# Patient Record
Sex: MALE | Race: WHITE | Employment: OTHER | URBAN - NONMETROPOLITAN AREA
[De-identification: names, ages, dates, MRNs, and addresses within clinical notes are randomized per-mention and may not be internally consistent; named-entity substitution may affect disease eponyms.]

---

## 2017-01-06 ENCOUNTER — OFFICE VISIT (OUTPATIENT)
Dept: PULMONOLOGY | Age: 67
End: 2017-01-06

## 2017-01-06 VITALS
DIASTOLIC BLOOD PRESSURE: 64 MMHG | SYSTOLIC BLOOD PRESSURE: 112 MMHG | WEIGHT: 179.8 LBS | HEART RATE: 70 BPM | BODY MASS INDEX: 26.63 KG/M2 | HEIGHT: 69 IN | OXYGEN SATURATION: 97 %

## 2017-01-06 DIAGNOSIS — I10 ESSENTIAL HYPERTENSION: ICD-10-CM

## 2017-01-06 DIAGNOSIS — I25.10 CORONARY ARTERY DISEASE INVOLVING NATIVE CORONARY ARTERY OF NATIVE HEART WITHOUT ANGINA PECTORIS: ICD-10-CM

## 2017-01-06 DIAGNOSIS — J44.9 COPD, SEVERE (HCC): ICD-10-CM

## 2017-01-06 DIAGNOSIS — Z99.89 OSA ON CPAP: Primary | ICD-10-CM

## 2017-01-06 DIAGNOSIS — G47.33 OSA ON CPAP: Primary | ICD-10-CM

## 2017-01-06 PROCEDURE — 99213 OFFICE O/P EST LOW 20 MIN: CPT | Performed by: INTERNAL MEDICINE

## 2017-01-31 ENCOUNTER — OFFICE VISIT (OUTPATIENT)
Dept: PULMONOLOGY | Age: 67
End: 2017-01-31

## 2017-01-31 VITALS
OXYGEN SATURATION: 97 % | HEIGHT: 69 IN | BODY MASS INDEX: 26.66 KG/M2 | TEMPERATURE: 98.1 F | HEART RATE: 87 BPM | WEIGHT: 180 LBS | SYSTOLIC BLOOD PRESSURE: 114 MMHG | DIASTOLIC BLOOD PRESSURE: 65 MMHG

## 2017-01-31 DIAGNOSIS — J44.9 COPD, SEVERE (HCC): Primary | ICD-10-CM

## 2017-01-31 PROCEDURE — 3023F SPIROM DOC REV: CPT | Performed by: INTERNAL MEDICINE

## 2017-01-31 PROCEDURE — G8427 DOCREV CUR MEDS BY ELIG CLIN: HCPCS | Performed by: INTERNAL MEDICINE

## 2017-01-31 PROCEDURE — 3017F COLORECTAL CA SCREEN DOC REV: CPT | Performed by: INTERNAL MEDICINE

## 2017-01-31 PROCEDURE — 1036F TOBACCO NON-USER: CPT | Performed by: INTERNAL MEDICINE

## 2017-01-31 PROCEDURE — G8926 SPIRO NO PERF OR DOC: HCPCS | Performed by: INTERNAL MEDICINE

## 2017-01-31 PROCEDURE — G8598 ASA/ANTIPLAT THER USED: HCPCS | Performed by: INTERNAL MEDICINE

## 2017-01-31 PROCEDURE — 1123F ACP DISCUSS/DSCN MKR DOCD: CPT | Performed by: INTERNAL MEDICINE

## 2017-01-31 PROCEDURE — 4040F PNEUMOC VAC/ADMIN/RCVD: CPT | Performed by: INTERNAL MEDICINE

## 2017-01-31 PROCEDURE — G8420 CALC BMI NORM PARAMETERS: HCPCS | Performed by: INTERNAL MEDICINE

## 2017-01-31 PROCEDURE — G8482 FLU IMMUNIZE ORDER/ADMIN: HCPCS | Performed by: INTERNAL MEDICINE

## 2017-01-31 PROCEDURE — 99214 OFFICE O/P EST MOD 30 MIN: CPT | Performed by: INTERNAL MEDICINE

## 2017-01-31 RX ORDER — ALBUTEROL SULFATE 90 UG/1
2 AEROSOL, METERED RESPIRATORY (INHALATION) EVERY 6 HOURS PRN
Qty: 1 INHALER | Refills: 11 | Status: SHIPPED | OUTPATIENT
Start: 2017-01-31 | End: 2017-09-26 | Stop reason: SDUPTHER

## 2017-04-10 ENCOUNTER — OFFICE VISIT (OUTPATIENT)
Dept: PULMONOLOGY | Age: 67
End: 2017-04-10

## 2017-04-10 VITALS
HEIGHT: 69 IN | WEIGHT: 169.4 LBS | HEART RATE: 72 BPM | DIASTOLIC BLOOD PRESSURE: 70 MMHG | BODY MASS INDEX: 25.09 KG/M2 | OXYGEN SATURATION: 95 % | SYSTOLIC BLOOD PRESSURE: 124 MMHG

## 2017-04-10 DIAGNOSIS — Z99.89 OBSTRUCTIVE SLEEP APNEA ON CPAP: Primary | ICD-10-CM

## 2017-04-10 DIAGNOSIS — G47.33 OBSTRUCTIVE SLEEP APNEA ON CPAP: Primary | ICD-10-CM

## 2017-04-10 PROCEDURE — 4040F PNEUMOC VAC/ADMIN/RCVD: CPT | Performed by: PHYSICIAN ASSISTANT

## 2017-04-10 PROCEDURE — 1036F TOBACCO NON-USER: CPT | Performed by: PHYSICIAN ASSISTANT

## 2017-04-10 PROCEDURE — G8427 DOCREV CUR MEDS BY ELIG CLIN: HCPCS | Performed by: PHYSICIAN ASSISTANT

## 2017-04-10 PROCEDURE — G8420 CALC BMI NORM PARAMETERS: HCPCS | Performed by: PHYSICIAN ASSISTANT

## 2017-04-10 PROCEDURE — G8598 ASA/ANTIPLAT THER USED: HCPCS | Performed by: PHYSICIAN ASSISTANT

## 2017-04-10 PROCEDURE — 3017F COLORECTAL CA SCREEN DOC REV: CPT | Performed by: PHYSICIAN ASSISTANT

## 2017-04-10 PROCEDURE — 99213 OFFICE O/P EST LOW 20 MIN: CPT | Performed by: PHYSICIAN ASSISTANT

## 2017-04-10 PROCEDURE — 1123F ACP DISCUSS/DSCN MKR DOCD: CPT | Performed by: PHYSICIAN ASSISTANT

## 2017-07-12 ENCOUNTER — OFFICE VISIT (OUTPATIENT)
Dept: PULMONOLOGY | Age: 67
End: 2017-07-12

## 2017-07-12 VITALS
HEIGHT: 69 IN | OXYGEN SATURATION: 95 % | DIASTOLIC BLOOD PRESSURE: 60 MMHG | HEART RATE: 82 BPM | WEIGHT: 181.2 LBS | SYSTOLIC BLOOD PRESSURE: 102 MMHG | BODY MASS INDEX: 26.84 KG/M2

## 2017-07-12 DIAGNOSIS — Z99.89 OBSTRUCTIVE SLEEP APNEA ON CPAP: Primary | ICD-10-CM

## 2017-07-12 DIAGNOSIS — G47.33 OBSTRUCTIVE SLEEP APNEA ON CPAP: Primary | ICD-10-CM

## 2017-07-12 PROCEDURE — 99213 OFFICE O/P EST LOW 20 MIN: CPT | Performed by: PHYSICIAN ASSISTANT

## 2017-07-12 PROCEDURE — 3017F COLORECTAL CA SCREEN DOC REV: CPT | Performed by: PHYSICIAN ASSISTANT

## 2017-07-12 PROCEDURE — 1123F ACP DISCUSS/DSCN MKR DOCD: CPT | Performed by: PHYSICIAN ASSISTANT

## 2017-07-12 PROCEDURE — G8427 DOCREV CUR MEDS BY ELIG CLIN: HCPCS | Performed by: PHYSICIAN ASSISTANT

## 2017-07-12 PROCEDURE — 1036F TOBACCO NON-USER: CPT | Performed by: PHYSICIAN ASSISTANT

## 2017-07-12 PROCEDURE — G8598 ASA/ANTIPLAT THER USED: HCPCS | Performed by: PHYSICIAN ASSISTANT

## 2017-07-12 PROCEDURE — 4040F PNEUMOC VAC/ADMIN/RCVD: CPT | Performed by: PHYSICIAN ASSISTANT

## 2017-07-12 PROCEDURE — G8419 CALC BMI OUT NRM PARAM NOF/U: HCPCS | Performed by: PHYSICIAN ASSISTANT

## 2017-07-17 ENCOUNTER — HOSPITAL ENCOUNTER (INPATIENT)
Age: 67
LOS: 6 days | Discharge: HOME | DRG: 438 | End: 2017-07-23
Attending: EMERGENCY MEDICINE | Admitting: INTERNAL MEDICINE
Payer: MEDICARE

## 2017-07-17 ENCOUNTER — HOSPITAL ENCOUNTER (OUTPATIENT)
Age: 67
Discharge: HOME | End: 2017-07-17
Payer: MEDICARE

## 2017-07-17 ENCOUNTER — APPOINTMENT (OUTPATIENT)
Dept: ULTRASOUND IMAGING | Age: 67
DRG: 438 | End: 2017-07-17
Payer: MEDICARE

## 2017-07-17 DIAGNOSIS — E11.00 DIABETIC HYPEROSMOLAR NON-KETOTIC STATE (HCC): ICD-10-CM

## 2017-07-17 DIAGNOSIS — K85.00 IDIOPATHIC ACUTE PANCREATITIS WITHOUT INFECTION OR NECROSIS: ICD-10-CM

## 2017-07-17 DIAGNOSIS — N17.9 ACUTE RENAL FAILURE, UNSPECIFIED ACUTE RENAL FAILURE TYPE (HCC): ICD-10-CM

## 2017-07-17 DIAGNOSIS — R73.9 HYPERGLYCEMIA: Primary | ICD-10-CM

## 2017-07-17 LAB
ACETONE BLOOD: POSITIVE
ALBUMIN SERPL-MCNC: 3.6 GM/DL (ref 3.5–5)
ALP BLD-CCNC: 160 U/L (ref 46–116)
ALT SERPL-CCNC: 63 U/L (ref 12–78)
AMORPHOUS: ABNORMAL
AMYLASE: 424 U/L (ref 20–104)
ANION GAP SERPL CALCULATED.3IONS-SCNC: 34 MEQ/L (ref 8–16)
ANION GAP: 28 MEQ/L (ref 8–16)
AST SERPL-CCNC: 40 U/L (ref 15–37)
AVERAGE GLUCOSE: 270 MG/DL (ref 70–126)
BACTERIA: ABNORMAL
BACTERIA: ABNORMAL
BASOPHILS # BLD: 0.3 % (ref 0–3)
BILIRUB SERPL-MCNC: 0.5 MG/DL (ref 0.2–1)
BILIRUBIN URINE: ABNORMAL
BILIRUBIN URINE: NEGATIVE
BLOOD, URINE: NEGATIVE
BLOOD, URINE: NEGATIVE
BUN BLDV-MCNC: 86 MG/DL (ref 7–22)
BUN BLDV-MCNC: 99 MG/DL (ref 7–18)
CALCIUM SERPL-MCNC: 8.1 MG/DL (ref 8.5–10.5)
CASTS UA: ABNORMAL /LPF
CASTS: ABNORMAL /LPF
CASTS: ABNORMAL /LPF
CHARACTER, URINE: CLEAR
CHARACTER, URINE: CLEAR
CHLORIDE BLD-SCNC: 88 MEQ/L (ref 98–107)
CHLORIDE BLD-SCNC: 91 MEQ/L (ref 98–111)
CO2: 11 MEQ/L (ref 23–33)
CO2: 13 MEQ/L (ref 21–32)
COLOR: YELLOW
COLOR: YELLOW
CREAT SERPL-MCNC: 3.3 MG/DL (ref 0.4–1.2)
CREAT SERPL-MCNC: 4 MG/DL (ref 0.8–1.3)
CREATININE, URINE: 52.1 MG/DL
CRYSTALS, UA: ABNORMAL
CRYSTALS: ABNORMAL
EOSINOPHILS RELATIVE PERCENT: 1 % (ref 0–4)
EPITHELIAL CELLS, UA: ABNORMAL /HPF
EPITHELIAL CELLS, UA: ABNORMAL /HPF
GFR SERPL CREATININE-BSD FRML MDRD: 19 ML/MIN/1.73M2
GFR, ESTIMATED: 16 ML/MIN/1.73M2
GLUCOSE BLD-MCNC: 1061 MG/DL (ref 74–106)
GLUCOSE BLD-MCNC: 162 MG/DL (ref 70–108)
GLUCOSE BLD-MCNC: 165 MG/DL (ref 70–108)
GLUCOSE BLD-MCNC: 191 MG/DL (ref 70–108)
GLUCOSE BLD-MCNC: 226 MG/DL (ref 70–108)
GLUCOSE BLD-MCNC: 310 MG/DL (ref 70–108)
GLUCOSE BLD-MCNC: 444 MG/DL (ref 70–108)
GLUCOSE BLD-MCNC: 553 MG/DL (ref 70–108)
GLUCOSE BLD-MCNC: 569 MG/DL (ref 70–108)
GLUCOSE BLD-MCNC: 636 MG/DL (ref 70–108)
GLUCOSE BLD-MCNC: > 600 MG/DL (ref 70–108)
GLUCOSE, URINE: >= 1000 MG/DL
GLUCOSE, URINE: >= 1000 MG/DL
HBA1C MFR BLD: 11 % (ref 4.4–6.4)
HCT VFR BLD CALC: 39.8 % (ref 42–52)
HEMOGLOBIN: 12.7 GM/DL (ref 14–18)
ICTOTEST: NEGATIVE
INR BLD: 0.79 (ref 0.85–1.13)
KETONES, URINE: 15
KETONES, URINE: 40
LEUKOCYTE EST, POC: NEGATIVE
LEUKOCYTE ESTERASE, URINE: NEGATIVE
LIPASE: > 600 U/L (ref 5.6–51.3)
LYMPHOCYTES # BLD: 8.9 % (ref 15–47)
MCH RBC QN AUTO: 28.1 PG (ref 27–31)
MCHC RBC AUTO-ENTMCNC: 31.9 GM/DL (ref 33–37)
MCV RBC AUTO: 88.2 FL (ref 80–94)
MICROALBUMIN UR-MCNC: 2.35 MG/DL
MICROALBUMIN/CREAT UR-RTO: 45 MG/G (ref 0–30)
MISCELLANEOUS LAB TEST RESULT: ABNORMAL
MONOCYTES: 5.3 % (ref 0–12)
MUCUS: ABNORMAL
NITRITE, URINE: NEGATIVE
NITRITE, URINE: NEGATIVE
PDW BLD-RTO: 12.3 % (ref 11.5–14.5)
PH UA: 5
PH UA: 5 (ref 5–9)
PLATELET # BLD: 387 THOU/MM3 (ref 130–400)
PMV BLD AUTO: 8.2 MCM (ref 7.4–10.4)
POC CALCIUM: 8.8 MG/DL (ref 8.5–10.1)
POTASSIUM SERPL-SCNC: 5 MEQ/L (ref 3.5–5.2)
POTASSIUM SERPL-SCNC: 6.1 MEQ/L (ref 3.5–5.1)
PROTEIN UA: NEGATIVE MG/DL
PROTEIN UA: NEGATIVE MG/DL
RBC # BLD: 4.52 MILL/MM3 (ref 4.7–6.1)
RBC UA: ABNORMAL /HPF
RBC URINE: ABNORMAL /HPF
REFLEX TO URINE C & S: ABNORMAL
RENAL EPITHELIAL, UA: ABNORMAL
SEGS: 84.5 % (ref 43–75)
SODIUM BLD-SCNC: 129 MEQ/L (ref 136–145)
SODIUM BLD-SCNC: 136 MEQ/L (ref 135–145)
SPECIFIC GRAVITY UA: 1.02 (ref 1–1.03)
SPECIFIC GRAVITY UA: < 1.005 (ref 1–1.03)
TOTAL PROTEIN: 7.3 GM/DL (ref 6.4–8.2)
TROPONIN I: 0.09 NG/ML
TROPONIN T: < 0.01 NG/ML
TSH SERPL DL<=0.05 MIU/L-ACNC: 1.69 UIU/ML (ref 0.4–4.2)
UROBILINOGEN, URINE: 0.2 EU/DL
UROBILINOGEN, URINE: 0.2 EU/DL (ref 0–1)
WBC # BLD: 13.1 THOU/MM3 (ref 4.8–10.8)
WBC UA: ABNORMAL /HPF
WBC UA: ABNORMAL /HPF
YEAST: ABNORMAL

## 2017-07-17 PROCEDURE — 82043 UR ALBUMIN QUANTITATIVE: CPT

## 2017-07-17 PROCEDURE — 6360000002 HC RX W HCPCS: Performed by: INTERNAL MEDICINE

## 2017-07-17 PROCEDURE — 94640 AIRWAY INHALATION TREATMENT: CPT

## 2017-07-17 PROCEDURE — 81001 URINALYSIS AUTO W/SCOPE: CPT

## 2017-07-17 PROCEDURE — 80053 COMPREHEN METABOLIC PANEL: CPT

## 2017-07-17 PROCEDURE — 84484 ASSAY OF TROPONIN QUANT: CPT

## 2017-07-17 PROCEDURE — 76705 ECHO EXAM OF ABDOMEN: CPT

## 2017-07-17 PROCEDURE — 6370000000 HC RX 637 (ALT 250 FOR IP): Performed by: EMERGENCY MEDICINE

## 2017-07-17 PROCEDURE — 99285 EMERGENCY DEPT VISIT HI MDM: CPT

## 2017-07-17 PROCEDURE — 83690 ASSAY OF LIPASE: CPT

## 2017-07-17 PROCEDURE — 99223 1ST HOSP IP/OBS HIGH 75: CPT | Performed by: INTERNAL MEDICINE

## 2017-07-17 PROCEDURE — 93005 ELECTROCARDIOGRAM TRACING: CPT

## 2017-07-17 PROCEDURE — 85025 COMPLETE CBC W/AUTO DIFF WBC: CPT

## 2017-07-17 PROCEDURE — 82010 KETONE BODYS QUAN: CPT

## 2017-07-17 PROCEDURE — 82150 ASSAY OF AMYLASE: CPT

## 2017-07-17 PROCEDURE — 2580000003 HC RX 258: Performed by: INTERNAL MEDICINE

## 2017-07-17 PROCEDURE — 84443 ASSAY THYROID STIM HORMONE: CPT

## 2017-07-17 PROCEDURE — 85610 PROTHROMBIN TIME: CPT

## 2017-07-17 PROCEDURE — 36415 COLL VENOUS BLD VENIPUNCTURE: CPT

## 2017-07-17 PROCEDURE — 80048 BASIC METABOLIC PNL TOTAL CA: CPT

## 2017-07-17 PROCEDURE — 2060000000 HC ICU INTERMEDIATE R&B

## 2017-07-17 PROCEDURE — 2580000003 HC RX 258: Performed by: EMERGENCY MEDICINE

## 2017-07-17 PROCEDURE — C9113 INJ PANTOPRAZOLE SODIUM, VIA: HCPCS | Performed by: INTERNAL MEDICINE

## 2017-07-17 PROCEDURE — 82948 REAGENT STRIP/BLOOD GLUCOSE: CPT

## 2017-07-17 PROCEDURE — 6370000000 HC RX 637 (ALT 250 FOR IP): Performed by: INTERNAL MEDICINE

## 2017-07-17 PROCEDURE — 84145 PROCALCITONIN (PCT): CPT

## 2017-07-17 PROCEDURE — 83036 HEMOGLOBIN GLYCOSYLATED A1C: CPT

## 2017-07-17 RX ORDER — SUCRALFATE ORAL 1 G/10ML
1 SUSPENSION ORAL EVERY 6 HOURS SCHEDULED
Status: DISCONTINUED | OUTPATIENT
Start: 2017-07-17 | End: 2017-07-23 | Stop reason: HOSPADM

## 2017-07-17 RX ORDER — CARVEDILOL 12.5 MG/1
12.5 TABLET ORAL 2 TIMES DAILY WITH MEALS
Status: ON HOLD | COMMUNITY
End: 2017-09-16

## 2017-07-17 RX ORDER — ACETAMINOPHEN 325 MG/1
650 TABLET ORAL EVERY 4 HOURS PRN
Status: DISCONTINUED | OUTPATIENT
Start: 2017-07-17 | End: 2017-07-23 | Stop reason: HOSPADM

## 2017-07-17 RX ORDER — SPIRONOLACTONE 25 MG/1
25 TABLET ORAL DAILY
COMMUNITY
End: 2018-04-14

## 2017-07-17 RX ORDER — HYDRALAZINE HYDROCHLORIDE 10 MG/1
10 TABLET, FILM COATED ORAL 3 TIMES DAILY
Status: DISCONTINUED | OUTPATIENT
Start: 2017-07-17 | End: 2017-07-23 | Stop reason: HOSPADM

## 2017-07-17 RX ORDER — NICOTINE POLACRILEX 4 MG
15 LOZENGE BUCCAL PRN
Status: DISCONTINUED | OUTPATIENT
Start: 2017-07-17 | End: 2017-07-23 | Stop reason: HOSPADM

## 2017-07-17 RX ORDER — HEPARIN SODIUM 5000 [USP'U]/ML
5000 INJECTION, SOLUTION INTRAVENOUS; SUBCUTANEOUS 2 TIMES DAILY
Status: DISCONTINUED | OUTPATIENT
Start: 2017-07-17 | End: 2017-07-23 | Stop reason: HOSPADM

## 2017-07-17 RX ORDER — SODIUM CHLORIDE 0.9 % (FLUSH) 0.9 %
10 SYRINGE (ML) INJECTION PRN
Status: DISCONTINUED | OUTPATIENT
Start: 2017-07-17 | End: 2017-07-17

## 2017-07-17 RX ORDER — 0.9 % SODIUM CHLORIDE 0.9 %
500 INTRAVENOUS SOLUTION INTRAVENOUS ONCE
Status: COMPLETED | OUTPATIENT
Start: 2017-07-17 | End: 2017-07-18

## 2017-07-17 RX ORDER — LEVOTHYROXINE SODIUM 0.03 MG/1
25 TABLET ORAL DAILY
Status: DISCONTINUED | OUTPATIENT
Start: 2017-07-17 | End: 2017-07-23 | Stop reason: HOSPADM

## 2017-07-17 RX ORDER — DEXTROSE MONOHYDRATE 50 MG/ML
100 INJECTION, SOLUTION INTRAVENOUS PRN
Status: DISCONTINUED | OUTPATIENT
Start: 2017-07-17 | End: 2017-07-23 | Stop reason: HOSPADM

## 2017-07-17 RX ORDER — SODIUM CHLORIDE 0.9 % (FLUSH) 0.9 %
10 SYRINGE (ML) INJECTION EVERY 12 HOURS SCHEDULED
Status: DISCONTINUED | OUTPATIENT
Start: 2017-07-17 | End: 2017-07-17

## 2017-07-17 RX ORDER — LEVOTHYROXINE SODIUM 0.03 MG/1
25 TABLET ORAL DAILY
COMMUNITY

## 2017-07-17 RX ORDER — SODIUM CHLORIDE 9 MG/ML
INJECTION, SOLUTION INTRAVENOUS CONTINUOUS
Status: DISCONTINUED | OUTPATIENT
Start: 2017-07-17 | End: 2017-07-17 | Stop reason: SDUPTHER

## 2017-07-17 RX ORDER — PRAVASTATIN SODIUM 80 MG/1
80 TABLET ORAL DAILY
Status: DISCONTINUED | OUTPATIENT
Start: 2017-07-17 | End: 2017-07-23 | Stop reason: HOSPADM

## 2017-07-17 RX ORDER — DEXTROSE MONOHYDRATE 25 G/50ML
12.5 INJECTION, SOLUTION INTRAVENOUS PRN
Status: DISCONTINUED | OUTPATIENT
Start: 2017-07-17 | End: 2017-07-23 | Stop reason: HOSPADM

## 2017-07-17 RX ORDER — ONDANSETRON 2 MG/ML
4 INJECTION INTRAMUSCULAR; INTRAVENOUS EVERY 6 HOURS PRN
Status: DISCONTINUED | OUTPATIENT
Start: 2017-07-17 | End: 2017-07-23 | Stop reason: HOSPADM

## 2017-07-17 RX ORDER — CARVEDILOL 6.25 MG/1
12.5 TABLET ORAL 2 TIMES DAILY WITH MEALS
Status: DISCONTINUED | OUTPATIENT
Start: 2017-07-17 | End: 2017-07-17 | Stop reason: SDUPTHER

## 2017-07-17 RX ORDER — 0.9 % SODIUM CHLORIDE 0.9 %
500 INTRAVENOUS SOLUTION INTRAVENOUS ONCE
Status: COMPLETED | OUTPATIENT
Start: 2017-07-18 | End: 2017-07-18

## 2017-07-17 RX ORDER — CARVEDILOL 6.25 MG/1
12.5 TABLET ORAL 2 TIMES DAILY
Status: DISCONTINUED | OUTPATIENT
Start: 2017-07-17 | End: 2017-07-23 | Stop reason: HOSPADM

## 2017-07-17 RX ORDER — LOSARTAN POTASSIUM 25 MG/1
25 TABLET ORAL DAILY
Status: DISCONTINUED | OUTPATIENT
Start: 2017-07-18 | End: 2017-07-22

## 2017-07-17 RX ORDER — SODIUM CHLORIDE 9 MG/ML
INJECTION, SOLUTION INTRAVENOUS CONTINUOUS
Status: DISCONTINUED | OUTPATIENT
Start: 2017-07-17 | End: 2017-07-18

## 2017-07-17 RX ORDER — ALBUTEROL SULFATE 90 UG/1
2 AEROSOL, METERED RESPIRATORY (INHALATION) EVERY 6 HOURS PRN
Status: DISCONTINUED | OUTPATIENT
Start: 2017-07-17 | End: 2017-07-23 | Stop reason: HOSPADM

## 2017-07-17 RX ORDER — ALBUTEROL SULFATE 2.5 MG/3ML
2.5 SOLUTION RESPIRATORY (INHALATION) 4 TIMES DAILY
Status: DISCONTINUED | OUTPATIENT
Start: 2017-07-17 | End: 2017-07-23 | Stop reason: HOSPADM

## 2017-07-17 RX ORDER — ONDANSETRON 2 MG/ML
4 INJECTION INTRAMUSCULAR; INTRAVENOUS EVERY 6 HOURS PRN
Status: DISCONTINUED | OUTPATIENT
Start: 2017-07-17 | End: 2017-07-17 | Stop reason: SDUPTHER

## 2017-07-17 RX ORDER — LOSARTAN POTASSIUM 50 MG/1
50 TABLET ORAL DAILY
Status: ON HOLD | COMMUNITY
End: 2017-07-23

## 2017-07-17 RX ORDER — BUMETANIDE 1 MG/1
2 TABLET ORAL DAILY
Status: DISCONTINUED | OUTPATIENT
Start: 2017-07-18 | End: 2017-07-18

## 2017-07-17 RX ORDER — 0.9 % SODIUM CHLORIDE 0.9 %
1000 INTRAVENOUS SOLUTION INTRAVENOUS ONCE
Status: COMPLETED | OUTPATIENT
Start: 2017-07-17 | End: 2017-07-17

## 2017-07-17 RX ORDER — ASPIRIN 81 MG/1
81 TABLET ORAL DAILY
Status: DISCONTINUED | OUTPATIENT
Start: 2017-07-17 | End: 2017-07-23 | Stop reason: HOSPADM

## 2017-07-17 RX ORDER — SPIRONOLACTONE 25 MG/1
25 TABLET ORAL DAILY
Status: DISCONTINUED | OUTPATIENT
Start: 2017-07-18 | End: 2017-07-23 | Stop reason: HOSPADM

## 2017-07-17 RX ORDER — SODIUM CHLORIDE 9 MG/ML
INJECTION, SOLUTION INTRAVENOUS CONTINUOUS
Status: DISCONTINUED | OUTPATIENT
Start: 2017-07-17 | End: 2017-07-17

## 2017-07-17 RX ORDER — LEVOTHYROXINE SODIUM 0.03 MG/1
25 TABLET ORAL DAILY
Status: DISCONTINUED | OUTPATIENT
Start: 2017-07-17 | End: 2017-07-17 | Stop reason: SDUPTHER

## 2017-07-17 RX ORDER — MAGNESIUM HYDROXIDE/ALUMINUM HYDROXICE/SIMETHICONE 120; 1200; 1200 MG/30ML; MG/30ML; MG/30ML
30 SUSPENSION ORAL EVERY 6 HOURS PRN
Status: DISCONTINUED | OUTPATIENT
Start: 2017-07-17 | End: 2017-07-23 | Stop reason: HOSPADM

## 2017-07-17 RX ORDER — BUMETANIDE 1 MG/1
1 TABLET ORAL 2 TIMES DAILY
COMMUNITY
End: 2018-10-23 | Stop reason: ALTCHOICE

## 2017-07-17 RX ORDER — ACETAMINOPHEN 325 MG/1
650 TABLET ORAL EVERY 6 HOURS PRN
Status: DISCONTINUED | OUTPATIENT
Start: 2017-07-17 | End: 2017-07-17 | Stop reason: SDUPTHER

## 2017-07-17 RX ADMIN — SODIUM CHLORIDE 1000 ML: 9 INJECTION, SOLUTION INTRAVENOUS at 12:35

## 2017-07-17 RX ADMIN — SODIUM CHLORIDE: 9 INJECTION, SOLUTION INTRAVENOUS at 16:47

## 2017-07-17 RX ADMIN — SODIUM CHLORIDE: 9 INJECTION, SOLUTION INTRAVENOUS at 21:58

## 2017-07-17 RX ADMIN — SODIUM CHLORIDE 1000 ML: 9 INJECTION, SOLUTION INTRAVENOUS at 14:11

## 2017-07-17 RX ADMIN — ALBUTEROL SULFATE 2.5 MG: 2.5 SOLUTION RESPIRATORY (INHALATION) at 22:44

## 2017-07-17 RX ADMIN — ONDANSETRON 4 MG: 2 INJECTION INTRAMUSCULAR; INTRAVENOUS at 16:47

## 2017-07-17 RX ADMIN — INSULIN HUMAN 4 UNITS: 100 INJECTION, SOLUTION PARENTERAL at 12:27

## 2017-07-17 RX ADMIN — SODIUM CHLORIDE 4 UNITS: 9 INJECTION, SOLUTION INTRAVENOUS at 12:30

## 2017-07-17 RX ADMIN — HYDRALAZINE HYDROCHLORIDE 10 MG: 10 TABLET, FILM COATED ORAL at 22:06

## 2017-07-17 RX ADMIN — HEPARIN SODIUM 5000 UNITS: 5000 INJECTION, SOLUTION INTRAVENOUS; SUBCUTANEOUS at 22:07

## 2017-07-17 RX ADMIN — CARVEDILOL 12.5 MG: 6.25 TABLET, FILM COATED ORAL at 22:06

## 2017-07-17 RX ADMIN — SODIUM CHLORIDE 8 MG/HR: 9 INJECTION, SOLUTION INTRAVENOUS at 19:06

## 2017-07-17 RX ADMIN — ONDANSETRON 4 MG: 2 INJECTION INTRAMUSCULAR; INTRAVENOUS at 16:21

## 2017-07-17 ASSESSMENT — ENCOUNTER SYMPTOMS
EYE PAIN: 0
VOMITING: 1
SHORTNESS OF BREATH: 0
WHEEZING: 0
BLOOD IN STOOL: 0
SORE THROAT: 0
DIARRHEA: 1
EYE DISCHARGE: 0
ABDOMINAL PAIN: 0
NAUSEA: 1

## 2017-07-17 ASSESSMENT — PAIN DESCRIPTION - LOCATION
LOCATION: ABDOMEN
LOCATION: ABDOMEN
LOCATION: OTHER (COMMENT)

## 2017-07-17 ASSESSMENT — PAIN DESCRIPTION - PAIN TYPE
TYPE: ACUTE PAIN

## 2017-07-17 ASSESSMENT — PAIN SCALES - GENERAL
PAINLEVEL_OUTOF10: 7
PAINLEVEL_OUTOF10: 10
PAINLEVEL_OUTOF10: 10

## 2017-07-17 ASSESSMENT — PAIN DESCRIPTION - DESCRIPTORS: DESCRIPTORS: BURNING;CONSTANT

## 2017-07-17 ASSESSMENT — PAIN DESCRIPTION - ONSET: ONSET: SUDDEN

## 2017-07-18 ENCOUNTER — APPOINTMENT (OUTPATIENT)
Dept: GENERAL RADIOLOGY | Age: 67
DRG: 438 | End: 2017-07-18
Payer: MEDICARE

## 2017-07-18 ENCOUNTER — APPOINTMENT (OUTPATIENT)
Dept: NUCLEAR MEDICINE | Age: 67
DRG: 438 | End: 2017-07-18
Payer: MEDICARE

## 2017-07-18 PROBLEM — K85.90 PANCREATITIS: Status: ACTIVE | Noted: 2017-07-18

## 2017-07-18 LAB
ANION GAP SERPL CALCULATED.3IONS-SCNC: 17 MEQ/L (ref 8–16)
BASOPHILS # BLD: 0.4 %
BASOPHILS ABSOLUTE: 0 THOU/MM3 (ref 0–0.1)
BUN BLDV-MCNC: 79 MG/DL (ref 7–22)
CALCIUM SERPL-MCNC: 7.7 MG/DL (ref 8.5–10.5)
CHLORIDE BLD-SCNC: 102 MEQ/L (ref 98–111)
CO2: 22 MEQ/L (ref 23–33)
CREAT SERPL-MCNC: 2.9 MG/DL (ref 0.4–1.2)
EOSINOPHIL # BLD: 0.4 %
EOSINOPHILS ABSOLUTE: 0 THOU/MM3 (ref 0–0.4)
GFR SERPL CREATININE-BSD FRML MDRD: 22 ML/MIN/1.73M2
GLUCOSE BLD-MCNC: 112 MG/DL (ref 70–108)
GLUCOSE BLD-MCNC: 125 MG/DL (ref 70–108)
GLUCOSE BLD-MCNC: 140 MG/DL (ref 70–108)
GLUCOSE BLD-MCNC: 141 MG/DL (ref 70–108)
GLUCOSE BLD-MCNC: 150 MG/DL (ref 70–108)
GLUCOSE BLD-MCNC: 150 MG/DL (ref 70–108)
GLUCOSE BLD-MCNC: 151 MG/DL (ref 70–108)
GLUCOSE BLD-MCNC: 162 MG/DL (ref 70–108)
GLUCOSE BLD-MCNC: 164 MG/DL (ref 70–108)
GLUCOSE BLD-MCNC: 180 MG/DL (ref 70–108)
GLUCOSE BLD-MCNC: 182 MG/DL (ref 70–108)
GLUCOSE BLD-MCNC: 187 MG/DL (ref 70–108)
GLUCOSE BLD-MCNC: 196 MG/DL (ref 70–108)
GLUCOSE BLD-MCNC: 199 MG/DL (ref 70–108)
GLUCOSE BLD-MCNC: 220 MG/DL (ref 70–108)
GLUCOSE BLD-MCNC: 224 MG/DL (ref 70–108)
GLUCOSE BLD-MCNC: 245 MG/DL (ref 70–108)
GLUCOSE BLD-MCNC: 76 MG/DL (ref 70–108)
GLUCOSE BLD-MCNC: 98 MG/DL (ref 70–108)
HCT VFR BLD CALC: 33.1 % (ref 42–52)
HEMOGLOBIN: 11.1 GM/DL (ref 14–18)
LYMPHOCYTES # BLD: 8.9 %
LYMPHOCYTES ABSOLUTE: 1 THOU/MM3 (ref 1–4.8)
MCH RBC QN AUTO: 28.7 PG (ref 27–31)
MCHC RBC AUTO-ENTMCNC: 33.4 GM/DL (ref 33–37)
MCV RBC AUTO: 85.8 FL (ref 80–94)
MONOCYTES # BLD: 8.2 %
MONOCYTES ABSOLUTE: 0.9 THOU/MM3 (ref 0.4–1.3)
NUCLEATED RED BLOOD CELLS: 0 /100 WBC
PDW BLD-RTO: 13.9 % (ref 11.5–14.5)
PLATELET # BLD: 279 THOU/MM3 (ref 130–400)
PMV BLD AUTO: 7.9 MCM (ref 7.4–10.4)
POTASSIUM SERPL-SCNC: 4.9 MEQ/L (ref 3.5–5.2)
PROCALCITONIN: 0.41 NG/ML (ref 0.01–0.09)
RBC # BLD: 3.86 MILL/MM3 (ref 4.7–6.1)
RBC # BLD: NORMAL 10*6/UL
SEG NEUTROPHILS: 82.1 %
SEGMENTED NEUTROPHILS ABSOLUTE COUNT: 9.2 THOU/MM3 (ref 1.8–7.7)
SODIUM BLD-SCNC: 141 MEQ/L (ref 135–145)
TROPONIN T: < 0.01 NG/ML
WBC # BLD: 11.2 THOU/MM3 (ref 4.8–10.8)

## 2017-07-18 PROCEDURE — 74000 XR ABDOMEN LIMITED (KUB): CPT

## 2017-07-18 PROCEDURE — 94640 AIRWAY INHALATION TREATMENT: CPT

## 2017-07-18 PROCEDURE — 97116 GAIT TRAINING THERAPY: CPT

## 2017-07-18 PROCEDURE — 97165 OT EVAL LOW COMPLEX 30 MIN: CPT

## 2017-07-18 PROCEDURE — 6370000000 HC RX 637 (ALT 250 FOR IP): Performed by: INTERNAL MEDICINE

## 2017-07-18 PROCEDURE — 6360000002 HC RX W HCPCS: Performed by: INTERNAL MEDICINE

## 2017-07-18 PROCEDURE — G8979 MOBILITY GOAL STATUS: HCPCS

## 2017-07-18 PROCEDURE — 99233 SBSQ HOSP IP/OBS HIGH 50: CPT | Performed by: INTERNAL MEDICINE

## 2017-07-18 PROCEDURE — A9537 TC99M MEBROFENIN: HCPCS | Performed by: NURSE PRACTITIONER

## 2017-07-18 PROCEDURE — 97110 THERAPEUTIC EXERCISES: CPT

## 2017-07-18 PROCEDURE — C9113 INJ PANTOPRAZOLE SODIUM, VIA: HCPCS | Performed by: INTERNAL MEDICINE

## 2017-07-18 PROCEDURE — G8978 MOBILITY CURRENT STATUS: HCPCS

## 2017-07-18 PROCEDURE — 2580000003 HC RX 258: Performed by: INTERNAL MEDICINE

## 2017-07-18 PROCEDURE — 3430000000 HC RX DIAGNOSTIC RADIOPHARMACEUTICAL: Performed by: NURSE PRACTITIONER

## 2017-07-18 PROCEDURE — 80048 BASIC METABOLIC PNL TOTAL CA: CPT

## 2017-07-18 PROCEDURE — 97162 PT EVAL MOD COMPLEX 30 MIN: CPT

## 2017-07-18 PROCEDURE — 36415 COLL VENOUS BLD VENIPUNCTURE: CPT

## 2017-07-18 PROCEDURE — 82948 REAGENT STRIP/BLOOD GLUCOSE: CPT

## 2017-07-18 PROCEDURE — 6360000004 HC RX CONTRAST MEDICATION: Performed by: RADIOLOGY

## 2017-07-18 PROCEDURE — 84484 ASSAY OF TROPONIN QUANT: CPT

## 2017-07-18 PROCEDURE — 78227 HEPATOBIL SYST IMAGE W/DRUG: CPT

## 2017-07-18 PROCEDURE — 71010 XR CHEST PORTABLE: CPT

## 2017-07-18 PROCEDURE — 2060000000 HC ICU INTERMEDIATE R&B

## 2017-07-18 PROCEDURE — 2580000003 HC RX 258: Performed by: RADIOLOGY

## 2017-07-18 PROCEDURE — 6360000002 HC RX W HCPCS: Performed by: ANESTHESIOLOGY

## 2017-07-18 PROCEDURE — 85025 COMPLETE CBC W/AUTO DIFF WBC: CPT

## 2017-07-18 PROCEDURE — 99222 1ST HOSP IP/OBS MODERATE 55: CPT | Performed by: SURGERY

## 2017-07-18 RX ORDER — BUMETANIDE 1 MG/1
2 TABLET ORAL DAILY
Status: DISCONTINUED | OUTPATIENT
Start: 2017-07-20 | End: 2017-07-23 | Stop reason: HOSPADM

## 2017-07-18 RX ORDER — SODIUM CHLORIDE 9 MG/ML
INJECTION, SOLUTION INTRAVENOUS CONTINUOUS
Status: DISCONTINUED | OUTPATIENT
Start: 2017-07-18 | End: 2017-07-21

## 2017-07-18 RX ADMIN — HYDROMORPHONE HYDROCHLORIDE 1 MG: 1 INJECTION, SOLUTION INTRAMUSCULAR; INTRAVENOUS; SUBCUTANEOUS at 16:43

## 2017-07-18 RX ADMIN — Medication 4.8 UNITS/HR: at 13:43

## 2017-07-18 RX ADMIN — SODIUM CHLORIDE 500 ML: 9 INJECTION, SOLUTION INTRAVENOUS at 00:06

## 2017-07-18 RX ADMIN — PRAVASTATIN SODIUM 80 MG: 80 TABLET ORAL at 21:19

## 2017-07-18 RX ADMIN — HEPARIN SODIUM 5000 UNITS: 5000 INJECTION, SOLUTION INTRAVENOUS; SUBCUTANEOUS at 21:19

## 2017-07-18 RX ADMIN — ALBUTEROL SULFATE 2.5 MG: 2.5 SOLUTION RESPIRATORY (INHALATION) at 10:56

## 2017-07-18 RX ADMIN — HYDROMORPHONE HYDROCHLORIDE 1 MG: 1 INJECTION, SOLUTION INTRAMUSCULAR; INTRAVENOUS; SUBCUTANEOUS at 00:06

## 2017-07-18 RX ADMIN — TIOTROPIUM BROMIDE 18 MCG: 18 CAPSULE ORAL; RESPIRATORY (INHALATION) at 10:56

## 2017-07-18 RX ADMIN — LOSARTAN POTASSIUM 25 MG: 25 TABLET, FILM COATED ORAL at 09:18

## 2017-07-18 RX ADMIN — LEVOTHYROXINE SODIUM 25 MCG: 25 TABLET ORAL at 09:18

## 2017-07-18 RX ADMIN — HYDRALAZINE HYDROCHLORIDE 10 MG: 10 TABLET, FILM COATED ORAL at 21:19

## 2017-07-18 RX ADMIN — SODIUM CHLORIDE 8 MG/HR: 9 INJECTION, SOLUTION INTRAVENOUS at 05:12

## 2017-07-18 RX ADMIN — HYDROMORPHONE HYDROCHLORIDE 1 MG: 1 INJECTION, SOLUTION INTRAMUSCULAR; INTRAVENOUS; SUBCUTANEOUS at 07:20

## 2017-07-18 RX ADMIN — Medication 8.1 MILLICURIE: at 14:25

## 2017-07-18 RX ADMIN — HYDROMORPHONE HYDROCHLORIDE 1 MG: 1 INJECTION, SOLUTION INTRAMUSCULAR; INTRAVENOUS; SUBCUTANEOUS at 21:21

## 2017-07-18 RX ADMIN — CARVEDILOL 12.5 MG: 6.25 TABLET, FILM COATED ORAL at 09:18

## 2017-07-18 RX ADMIN — HYDRALAZINE HYDROCHLORIDE 10 MG: 10 TABLET, FILM COATED ORAL at 09:18

## 2017-07-18 RX ADMIN — ASPIRIN 81 MG: 81 TABLET ORAL at 09:18

## 2017-07-18 RX ADMIN — HEPARIN SODIUM 5000 UNITS: 5000 INJECTION, SOLUTION INTRAVENOUS; SUBCUTANEOUS at 09:18

## 2017-07-18 RX ADMIN — SODIUM CHLORIDE 1.65 MCG: 9 INJECTION, SOLUTION INTRAVENOUS at 15:25

## 2017-07-18 RX ADMIN — ALBUTEROL SULFATE 2.5 MG: 2.5 SOLUTION RESPIRATORY (INHALATION) at 16:48

## 2017-07-18 RX ADMIN — SODIUM CHLORIDE 500 ML: 9 INJECTION, SOLUTION INTRAVENOUS at 02:30

## 2017-07-18 RX ADMIN — SPIRONOLACTONE 25 MG: 25 TABLET, FILM COATED ORAL at 09:18

## 2017-07-18 RX ADMIN — CARVEDILOL 12.5 MG: 6.25 TABLET, FILM COATED ORAL at 21:19

## 2017-07-18 RX ADMIN — SODIUM CHLORIDE: 9 INJECTION, SOLUTION INTRAVENOUS at 09:17

## 2017-07-18 RX ADMIN — SODIUM CHLORIDE 8 MG/HR: 9 INJECTION, SOLUTION INTRAVENOUS at 18:04

## 2017-07-18 RX ADMIN — SODIUM CHLORIDE: 9 INJECTION, SOLUTION INTRAVENOUS at 18:03

## 2017-07-18 RX ADMIN — ALBUTEROL SULFATE 2.5 MG: 2.5 SOLUTION RESPIRATORY (INHALATION) at 21:06

## 2017-07-18 RX ADMIN — HYDRALAZINE HYDROCHLORIDE 10 MG: 10 TABLET, FILM COATED ORAL at 16:50

## 2017-07-18 RX ADMIN — BUMETANIDE 2 MG: 1 TABLET ORAL at 09:18

## 2017-07-18 ASSESSMENT — PAIN DESCRIPTION - PAIN TYPE
TYPE: ACUTE PAIN
TYPE: ACUTE PAIN

## 2017-07-18 ASSESSMENT — PAIN SCALES - GENERAL
PAINLEVEL_OUTOF10: 10
PAINLEVEL_OUTOF10: 6
PAINLEVEL_OUTOF10: 0
PAINLEVEL_OUTOF10: 4
PAINLEVEL_OUTOF10: 6
PAINLEVEL_OUTOF10: 3
PAINLEVEL_OUTOF10: 8
PAINLEVEL_OUTOF10: 4
PAINLEVEL_OUTOF10: 5

## 2017-07-18 ASSESSMENT — PAIN DESCRIPTION - LOCATION
LOCATION: ABDOMEN

## 2017-07-19 ENCOUNTER — APPOINTMENT (OUTPATIENT)
Dept: MRI IMAGING | Age: 67
DRG: 438 | End: 2017-07-19
Payer: MEDICARE

## 2017-07-19 LAB
ANION GAP SERPL CALCULATED.3IONS-SCNC: 9 MEQ/L (ref 8–16)
AVERAGE GLUCOSE: 282 MG/DL (ref 70–126)
BUN BLDV-MCNC: 55 MG/DL (ref 7–22)
CALCIUM SERPL-MCNC: 7.8 MG/DL (ref 8.5–10.5)
CHLORIDE BLD-SCNC: 106 MEQ/L (ref 98–111)
CO2: 25 MEQ/L (ref 23–33)
CREAT SERPL-MCNC: 2 MG/DL (ref 0.4–1.2)
EKG ATRIAL RATE: 98 BPM
EKG P AXIS: 67 DEGREES
EKG P-R INTERVAL: 208 MS
EKG Q-T INTERVAL: 380 MS
EKG QRS DURATION: 98 MS
EKG QTC CALCULATION (BAZETT): 485 MS
EKG R AXIS: 117 DEGREES
EKG T AXIS: 36 DEGREES
EKG VENTRICULAR RATE: 98 BPM
GFR SERPL CREATININE-BSD FRML MDRD: 33 ML/MIN/1.73M2
GLUCOSE BLD-MCNC: 102 MG/DL (ref 70–108)
GLUCOSE BLD-MCNC: 103 MG/DL (ref 70–108)
GLUCOSE BLD-MCNC: 103 MG/DL (ref 70–108)
GLUCOSE BLD-MCNC: 104 MG/DL (ref 70–108)
GLUCOSE BLD-MCNC: 107 MG/DL (ref 70–108)
GLUCOSE BLD-MCNC: 107 MG/DL (ref 70–108)
GLUCOSE BLD-MCNC: 108 MG/DL (ref 70–108)
GLUCOSE BLD-MCNC: 108 MG/DL (ref 70–108)
GLUCOSE BLD-MCNC: 110 MG/DL (ref 70–108)
GLUCOSE BLD-MCNC: 114 MG/DL (ref 70–108)
GLUCOSE BLD-MCNC: 118 MG/DL (ref 70–108)
GLUCOSE BLD-MCNC: 120 MG/DL (ref 70–108)
GLUCOSE BLD-MCNC: 120 MG/DL (ref 70–108)
GLUCOSE BLD-MCNC: 134 MG/DL (ref 70–108)
GLUCOSE BLD-MCNC: 187 MG/DL (ref 70–108)
GLUCOSE BLD-MCNC: 96 MG/DL (ref 70–108)
GLUCOSE BLD-MCNC: 98 MG/DL (ref 70–108)
HBA1C MFR BLD: 11.4 % (ref 4.4–6.4)
HCT VFR BLD CALC: 32.6 % (ref 42–52)
HEMOGLOBIN: 10.9 GM/DL (ref 14–18)
LIPASE: > 600 U/L (ref 5.6–51.3)
MAGNESIUM: 2.2 MG/DL (ref 1.6–2.4)
MCH RBC QN AUTO: 28.5 PG (ref 27–31)
MCHC RBC AUTO-ENTMCNC: 33.4 GM/DL (ref 33–37)
MCV RBC AUTO: 85.3 FL (ref 80–94)
PDW BLD-RTO: 14.3 % (ref 11.5–14.5)
PLATELET # BLD: 237 THOU/MM3 (ref 130–400)
PMV BLD AUTO: 7.8 MCM (ref 7.4–10.4)
POTASSIUM SERPL-SCNC: 4.4 MEQ/L (ref 3.5–5.2)
RBC # BLD: 3.82 MILL/MM3 (ref 4.7–6.1)
SODIUM BLD-SCNC: 140 MEQ/L (ref 135–145)
TRIGL SERPL-MCNC: 298 MG/DL (ref 0–199)
WBC # BLD: 9.6 THOU/MM3 (ref 4.8–10.8)

## 2017-07-19 PROCEDURE — 83036 HEMOGLOBIN GLYCOSYLATED A1C: CPT

## 2017-07-19 PROCEDURE — 2580000003 HC RX 258: Performed by: INTERNAL MEDICINE

## 2017-07-19 PROCEDURE — 83690 ASSAY OF LIPASE: CPT

## 2017-07-19 PROCEDURE — A9585 GADOBUTROL INJECTION: HCPCS | Performed by: SURGERY

## 2017-07-19 PROCEDURE — 6370000000 HC RX 637 (ALT 250 FOR IP): Performed by: INTERNAL MEDICINE

## 2017-07-19 PROCEDURE — 80048 BASIC METABOLIC PNL TOTAL CA: CPT

## 2017-07-19 PROCEDURE — 83735 ASSAY OF MAGNESIUM: CPT

## 2017-07-19 PROCEDURE — 99232 SBSQ HOSP IP/OBS MODERATE 35: CPT | Performed by: SURGERY

## 2017-07-19 PROCEDURE — C9113 INJ PANTOPRAZOLE SODIUM, VIA: HCPCS | Performed by: INTERNAL MEDICINE

## 2017-07-19 PROCEDURE — 6360000002 HC RX W HCPCS: Performed by: INTERNAL MEDICINE

## 2017-07-19 PROCEDURE — 97110 THERAPEUTIC EXERCISES: CPT

## 2017-07-19 PROCEDURE — 82948 REAGENT STRIP/BLOOD GLUCOSE: CPT

## 2017-07-19 PROCEDURE — 97116 GAIT TRAINING THERAPY: CPT

## 2017-07-19 PROCEDURE — 36415 COLL VENOUS BLD VENIPUNCTURE: CPT

## 2017-07-19 PROCEDURE — 84478 ASSAY OF TRIGLYCERIDES: CPT

## 2017-07-19 PROCEDURE — 2060000000 HC ICU INTERMEDIATE R&B

## 2017-07-19 PROCEDURE — 6360000002 HC RX W HCPCS: Performed by: SURGERY

## 2017-07-19 PROCEDURE — 94640 AIRWAY INHALATION TREATMENT: CPT

## 2017-07-19 PROCEDURE — 6360000002 HC RX W HCPCS: Performed by: ANESTHESIOLOGY

## 2017-07-19 PROCEDURE — 97530 THERAPEUTIC ACTIVITIES: CPT

## 2017-07-19 PROCEDURE — 85027 COMPLETE CBC AUTOMATED: CPT

## 2017-07-19 PROCEDURE — 99232 SBSQ HOSP IP/OBS MODERATE 35: CPT | Performed by: INTERNAL MEDICINE

## 2017-07-19 PROCEDURE — 74183 MRI ABD W/O CNTR FLWD CNTR: CPT

## 2017-07-19 RX ADMIN — HYDRALAZINE HYDROCHLORIDE 10 MG: 10 TABLET, FILM COATED ORAL at 17:14

## 2017-07-19 RX ADMIN — HYDRALAZINE HYDROCHLORIDE 10 MG: 10 TABLET, FILM COATED ORAL at 09:29

## 2017-07-19 RX ADMIN — SODIUM CHLORIDE: 9 INJECTION, SOLUTION INTRAVENOUS at 05:14

## 2017-07-19 RX ADMIN — GADOBUTROL 10 ML: 604.72 INJECTION INTRAVENOUS at 09:05

## 2017-07-19 RX ADMIN — CARVEDILOL 12.5 MG: 6.25 TABLET, FILM COATED ORAL at 20:50

## 2017-07-19 RX ADMIN — ASPIRIN 81 MG: 81 TABLET ORAL at 09:29

## 2017-07-19 RX ADMIN — ONDANSETRON 4 MG: 2 INJECTION INTRAMUSCULAR; INTRAVENOUS at 10:40

## 2017-07-19 RX ADMIN — HYDROMORPHONE HYDROCHLORIDE 1 MG: 1 INJECTION, SOLUTION INTRAMUSCULAR; INTRAVENOUS; SUBCUTANEOUS at 05:15

## 2017-07-19 RX ADMIN — TIOTROPIUM BROMIDE 18 MCG: 18 CAPSULE ORAL; RESPIRATORY (INHALATION) at 09:56

## 2017-07-19 RX ADMIN — SUCRALFATE 1 G: 1 SUSPENSION ORAL at 19:35

## 2017-07-19 RX ADMIN — SODIUM CHLORIDE 8 MG/HR: 9 INJECTION, SOLUTION INTRAVENOUS at 20:51

## 2017-07-19 RX ADMIN — SODIUM CHLORIDE: 9 INJECTION, SOLUTION INTRAVENOUS at 14:21

## 2017-07-19 RX ADMIN — HYDROMORPHONE HYDROCHLORIDE 1 MG: 1 INJECTION, SOLUTION INTRAMUSCULAR; INTRAVENOUS; SUBCUTANEOUS at 17:00

## 2017-07-19 RX ADMIN — CARVEDILOL 12.5 MG: 6.25 TABLET, FILM COATED ORAL at 09:28

## 2017-07-19 RX ADMIN — HEPARIN SODIUM 5000 UNITS: 5000 INJECTION, SOLUTION INTRAVENOUS; SUBCUTANEOUS at 20:56

## 2017-07-19 RX ADMIN — ALBUTEROL SULFATE 2.5 MG: 2.5 SOLUTION RESPIRATORY (INHALATION) at 18:09

## 2017-07-19 RX ADMIN — SODIUM CHLORIDE 8 MG/HR: 9 INJECTION, SOLUTION INTRAVENOUS at 05:14

## 2017-07-19 RX ADMIN — LEVOTHYROXINE SODIUM 25 MCG: 25 TABLET ORAL at 09:28

## 2017-07-19 RX ADMIN — HYDROMORPHONE HYDROCHLORIDE 1 MG: 1 INJECTION, SOLUTION INTRAMUSCULAR; INTRAVENOUS; SUBCUTANEOUS at 10:11

## 2017-07-19 RX ADMIN — ALBUTEROL SULFATE 2.5 MG: 2.5 SOLUTION RESPIRATORY (INHALATION) at 09:56

## 2017-07-19 RX ADMIN — PRAVASTATIN SODIUM 80 MG: 80 TABLET ORAL at 20:51

## 2017-07-19 RX ADMIN — SPIRONOLACTONE 25 MG: 25 TABLET, FILM COATED ORAL at 09:29

## 2017-07-19 RX ADMIN — HYDRALAZINE HYDROCHLORIDE 10 MG: 10 TABLET, FILM COATED ORAL at 20:51

## 2017-07-19 RX ADMIN — ALBUTEROL SULFATE 2.5 MG: 2.5 SOLUTION RESPIRATORY (INHALATION) at 21:37

## 2017-07-19 RX ADMIN — SUCRALFATE 1 G: 1 SUSPENSION ORAL at 12:26

## 2017-07-19 RX ADMIN — HEPARIN SODIUM 5000 UNITS: 5000 INJECTION, SOLUTION INTRAVENOUS; SUBCUTANEOUS at 09:30

## 2017-07-19 RX ADMIN — LOSARTAN POTASSIUM 25 MG: 25 TABLET, FILM COATED ORAL at 09:29

## 2017-07-19 ASSESSMENT — PAIN DESCRIPTION - LOCATION
LOCATION: ABDOMEN

## 2017-07-19 ASSESSMENT — PAIN DESCRIPTION - PAIN TYPE
TYPE: ACUTE PAIN

## 2017-07-19 ASSESSMENT — PAIN SCALES - GENERAL
PAINLEVEL_OUTOF10: 10
PAINLEVEL_OUTOF10: 4
PAINLEVEL_OUTOF10: 4
PAINLEVEL_OUTOF10: 5
PAINLEVEL_OUTOF10: 3
PAINLEVEL_OUTOF10: 6
PAINLEVEL_OUTOF10: 3
PAINLEVEL_OUTOF10: 10
PAINLEVEL_OUTOF10: 3
PAINLEVEL_OUTOF10: 9
PAINLEVEL_OUTOF10: 0
PAINLEVEL_OUTOF10: 6

## 2017-07-19 ASSESSMENT — PAIN DESCRIPTION - DESCRIPTORS
DESCRIPTORS: BURNING;CONSTANT

## 2017-07-20 ENCOUNTER — ANESTHESIA EVENT (OUTPATIENT)
Dept: ENDOSCOPY | Age: 67
DRG: 438 | End: 2017-07-20
Payer: MEDICARE

## 2017-07-20 ENCOUNTER — ANESTHESIA (OUTPATIENT)
Dept: ENDOSCOPY | Age: 67
DRG: 438 | End: 2017-07-20
Payer: MEDICARE

## 2017-07-20 VITALS — SYSTOLIC BLOOD PRESSURE: 131 MMHG | DIASTOLIC BLOOD PRESSURE: 57 MMHG | OXYGEN SATURATION: 99 %

## 2017-07-20 LAB
ALBUMIN SERPL-MCNC: 2.6 G/DL (ref 3.5–5.1)
ALBUMIN SERPL-MCNC: 2.8 G/DL (ref 3.5–5.1)
ALP BLD-CCNC: 125 U/L (ref 38–126)
ALP BLD-CCNC: 125 U/L (ref 38–126)
ALT SERPL-CCNC: 30 U/L (ref 11–66)
ALT SERPL-CCNC: 31 U/L (ref 11–66)
AMYLASE: 191 U/L (ref 20–104)
ANION GAP SERPL CALCULATED.3IONS-SCNC: 11 MEQ/L (ref 8–16)
AST SERPL-CCNC: 29 U/L (ref 5–40)
AST SERPL-CCNC: 32 U/L (ref 5–40)
BILIRUB SERPL-MCNC: 0.2 MG/DL (ref 0.3–1.2)
BILIRUB SERPL-MCNC: 0.2 MG/DL (ref 0.3–1.2)
BILIRUBIN DIRECT: < 0.2 MG/DL (ref 0–0.3)
BUN BLDV-MCNC: 30 MG/DL (ref 7–22)
CA 19-9: 84 U/ML (ref 0–35)
CALCIUM IONIZED: 1.07 MMOL/L (ref 1.12–1.32)
CALCIUM SERPL-MCNC: 7.8 MG/DL (ref 8.5–10.5)
CHLORIDE BLD-SCNC: 111 MEQ/L (ref 98–111)
CO2: 23 MEQ/L (ref 23–33)
CREAT SERPL-MCNC: 1.3 MG/DL (ref 0.4–1.2)
GFR SERPL CREATININE-BSD FRML MDRD: 55 ML/MIN/1.73M2
GLUCOSE BLD-MCNC: 101 MG/DL (ref 70–108)
GLUCOSE BLD-MCNC: 107 MG/DL (ref 70–108)
GLUCOSE BLD-MCNC: 108 MG/DL (ref 70–108)
GLUCOSE BLD-MCNC: 110 MG/DL (ref 70–108)
GLUCOSE BLD-MCNC: 110 MG/DL (ref 70–108)
GLUCOSE BLD-MCNC: 111 MG/DL (ref 70–108)
GLUCOSE BLD-MCNC: 119 MG/DL (ref 70–108)
GLUCOSE BLD-MCNC: 120 MG/DL (ref 70–108)
GLUCOSE BLD-MCNC: 122 MG/DL (ref 70–108)
GLUCOSE BLD-MCNC: 126 MG/DL (ref 70–108)
GLUCOSE BLD-MCNC: 126 MG/DL (ref 70–108)
GLUCOSE BLD-MCNC: 135 MG/DL (ref 70–108)
GLUCOSE BLD-MCNC: 140 MG/DL (ref 70–108)
GLUCOSE BLD-MCNC: 186 MG/DL (ref 70–108)
GLUCOSE BLD-MCNC: 87 MG/DL (ref 70–108)
GLUCOSE BLD-MCNC: 89 MG/DL (ref 70–108)
GLUCOSE BLD-MCNC: 93 MG/DL (ref 70–108)
GLUCOSE BLD-MCNC: 94 MG/DL (ref 70–108)
HCT VFR BLD CALC: 33.7 % (ref 42–52)
HEMOGLOBIN: 11.2 GM/DL (ref 14–18)
LIPASE: 281.9 U/L (ref 5.6–51.3)
MCH RBC QN AUTO: 29 PG (ref 27–31)
MCHC RBC AUTO-ENTMCNC: 33.3 GM/DL (ref 33–37)
MCV RBC AUTO: 87 FL (ref 80–94)
PDW BLD-RTO: 14.6 % (ref 11.5–14.5)
PLATELET # BLD: 201 THOU/MM3 (ref 130–400)
PMV BLD AUTO: 8.2 MCM (ref 7.4–10.4)
POTASSIUM SERPL-SCNC: 4.6 MEQ/L (ref 3.5–5.2)
RBC # BLD: 3.87 MILL/MM3 (ref 4.7–6.1)
SODIUM BLD-SCNC: 145 MEQ/L (ref 135–145)
TOTAL PROTEIN: 5.5 G/DL (ref 6.1–8)
TOTAL PROTEIN: 5.8 G/DL (ref 6.1–8)
WBC # BLD: 7 THOU/MM3 (ref 4.8–10.8)

## 2017-07-20 PROCEDURE — 2500000003 HC RX 250 WO HCPCS: Performed by: SPECIALIST

## 2017-07-20 PROCEDURE — 6360000002 HC RX W HCPCS: Performed by: INTERNAL MEDICINE

## 2017-07-20 PROCEDURE — 2580000003 HC RX 258: Performed by: INTERNAL MEDICINE

## 2017-07-20 PROCEDURE — 86301 IMMUNOASSAY TUMOR CA 19-9: CPT

## 2017-07-20 PROCEDURE — C9113 INJ PANTOPRAZOLE SODIUM, VIA: HCPCS | Performed by: INTERNAL MEDICINE

## 2017-07-20 PROCEDURE — 3700000000 HC ANESTHESIA ATTENDED CARE: Performed by: INTERNAL MEDICINE

## 2017-07-20 PROCEDURE — 80053 COMPREHEN METABOLIC PANEL: CPT

## 2017-07-20 PROCEDURE — 88305 TISSUE EXAM BY PATHOLOGIST: CPT

## 2017-07-20 PROCEDURE — 99232 SBSQ HOSP IP/OBS MODERATE 35: CPT | Performed by: SURGERY

## 2017-07-20 PROCEDURE — 6360000002 HC RX W HCPCS: Performed by: SPECIALIST

## 2017-07-20 PROCEDURE — 3700000001 HC ADD 15 MINUTES (ANESTHESIA): Performed by: INTERNAL MEDICINE

## 2017-07-20 PROCEDURE — 2060000000 HC ICU INTERMEDIATE R&B

## 2017-07-20 PROCEDURE — 3609012400 HC EGD TRANSORAL BIOPSY SINGLE/MULTIPLE: Performed by: INTERNAL MEDICINE

## 2017-07-20 PROCEDURE — 2500000003 HC RX 250 WO HCPCS

## 2017-07-20 PROCEDURE — 82330 ASSAY OF CALCIUM: CPT

## 2017-07-20 PROCEDURE — 97116 GAIT TRAINING THERAPY: CPT

## 2017-07-20 PROCEDURE — 6370000000 HC RX 637 (ALT 250 FOR IP): Performed by: INTERNAL MEDICINE

## 2017-07-20 PROCEDURE — 85027 COMPLETE CBC AUTOMATED: CPT

## 2017-07-20 PROCEDURE — 99233 SBSQ HOSP IP/OBS HIGH 50: CPT | Performed by: INTERNAL MEDICINE

## 2017-07-20 PROCEDURE — 83690 ASSAY OF LIPASE: CPT

## 2017-07-20 PROCEDURE — 80076 HEPATIC FUNCTION PANEL: CPT

## 2017-07-20 PROCEDURE — 7100000000 HC PACU RECOVERY - FIRST 15 MIN: Performed by: INTERNAL MEDICINE

## 2017-07-20 PROCEDURE — 6360000002 HC RX W HCPCS: Performed by: ANESTHESIOLOGY

## 2017-07-20 PROCEDURE — 2580000003 HC RX 258: Performed by: SPECIALIST

## 2017-07-20 PROCEDURE — 82948 REAGENT STRIP/BLOOD GLUCOSE: CPT

## 2017-07-20 PROCEDURE — 0DB78ZX EXCISION OF STOMACH, PYLORUS, VIA NATURAL OR ARTIFICIAL OPENING ENDOSCOPIC, DIAGNOSTIC: ICD-10-PCS | Performed by: INTERNAL MEDICINE

## 2017-07-20 PROCEDURE — 82150 ASSAY OF AMYLASE: CPT

## 2017-07-20 PROCEDURE — 36415 COLL VENOUS BLD VENIPUNCTURE: CPT

## 2017-07-20 PROCEDURE — 97110 THERAPEUTIC EXERCISES: CPT

## 2017-07-20 PROCEDURE — 94640 AIRWAY INHALATION TREATMENT: CPT

## 2017-07-20 RX ORDER — SODIUM CHLORIDE 450 MG/100ML
INJECTION, SOLUTION INTRAVENOUS CONTINUOUS PRN
Status: DISCONTINUED | OUTPATIENT
Start: 2017-07-20 | End: 2017-07-20

## 2017-07-20 RX ORDER — LIDOCAINE HYDROCHLORIDE 10 MG/ML
INJECTION, SOLUTION INFILTRATION; PERINEURAL PRN
Status: DISCONTINUED | OUTPATIENT
Start: 2017-07-20 | End: 2017-07-20 | Stop reason: SDUPTHER

## 2017-07-20 RX ORDER — SODIUM CHLORIDE 450 MG/100ML
INJECTION, SOLUTION INTRAVENOUS CONTINUOUS PRN
Status: DISCONTINUED | OUTPATIENT
Start: 2017-07-20 | End: 2017-07-20 | Stop reason: SDUPTHER

## 2017-07-20 RX ORDER — PROPOFOL 10 MG/ML
INJECTION, EMULSION INTRAVENOUS PRN
Status: DISCONTINUED | OUTPATIENT
Start: 2017-07-20 | End: 2017-07-20 | Stop reason: SDUPTHER

## 2017-07-20 RX ADMIN — SUCRALFATE 1 G: 1 SUSPENSION ORAL at 23:41

## 2017-07-20 RX ADMIN — HEPARIN SODIUM 5000 UNITS: 5000 INJECTION, SOLUTION INTRAVENOUS; SUBCUTANEOUS at 20:13

## 2017-07-20 RX ADMIN — SUCRALFATE 1 G: 1 SUSPENSION ORAL at 05:44

## 2017-07-20 RX ADMIN — BUMETANIDE 2 MG: 1 TABLET ORAL at 08:39

## 2017-07-20 RX ADMIN — ALBUTEROL SULFATE 2.5 MG: 2.5 SOLUTION RESPIRATORY (INHALATION) at 17:47

## 2017-07-20 RX ADMIN — PRAVASTATIN SODIUM 80 MG: 80 TABLET ORAL at 20:12

## 2017-07-20 RX ADMIN — SUCRALFATE 1 G: 1 SUSPENSION ORAL at 17:37

## 2017-07-20 RX ADMIN — CARVEDILOL 12.5 MG: 6.25 TABLET, FILM COATED ORAL at 08:39

## 2017-07-20 RX ADMIN — SUCRALFATE 1 G: 1 SUSPENSION ORAL at 01:31

## 2017-07-20 RX ADMIN — LEVOTHYROXINE SODIUM 25 MCG: 25 TABLET ORAL at 08:39

## 2017-07-20 RX ADMIN — SODIUM CHLORIDE 8 MG/HR: 9 INJECTION, SOLUTION INTRAVENOUS at 06:34

## 2017-07-20 RX ADMIN — HYDRALAZINE HYDROCHLORIDE 10 MG: 10 TABLET, FILM COATED ORAL at 08:39

## 2017-07-20 RX ADMIN — LIDOCAINE HYDROCHLORIDE 100 MG: 10 INJECTION, SOLUTION INFILTRATION; PERINEURAL at 14:41

## 2017-07-20 RX ADMIN — SPIRONOLACTONE 25 MG: 25 TABLET, FILM COATED ORAL at 08:38

## 2017-07-20 RX ADMIN — ALBUTEROL SULFATE 2.5 MG: 2.5 SOLUTION RESPIRATORY (INHALATION) at 21:35

## 2017-07-20 RX ADMIN — HYDROMORPHONE HYDROCHLORIDE 1 MG: 1 INJECTION, SOLUTION INTRAMUSCULAR; INTRAVENOUS; SUBCUTANEOUS at 01:31

## 2017-07-20 RX ADMIN — SODIUM CHLORIDE: 9 INJECTION, SOLUTION INTRAVENOUS at 20:12

## 2017-07-20 RX ADMIN — CARVEDILOL 12.5 MG: 6.25 TABLET, FILM COATED ORAL at 20:12

## 2017-07-20 RX ADMIN — SODIUM CHLORIDE: 4.5 INJECTION, SOLUTION INTRAVENOUS at 14:37

## 2017-07-20 RX ADMIN — HYDROMORPHONE HYDROCHLORIDE 1 MG: 1 INJECTION, SOLUTION INTRAMUSCULAR; INTRAVENOUS; SUBCUTANEOUS at 10:11

## 2017-07-20 RX ADMIN — LOSARTAN POTASSIUM 25 MG: 25 TABLET, FILM COATED ORAL at 08:39

## 2017-07-20 RX ADMIN — Medication 1.4 UNITS/HR: at 06:34

## 2017-07-20 RX ADMIN — HYDROMORPHONE HYDROCHLORIDE 1 MG: 1 INJECTION, SOLUTION INTRAMUSCULAR; INTRAVENOUS; SUBCUTANEOUS at 17:36

## 2017-07-20 RX ADMIN — TIOTROPIUM BROMIDE 18 MCG: 18 CAPSULE ORAL; RESPIRATORY (INHALATION) at 08:18

## 2017-07-20 RX ADMIN — PROPOFOL 50 MG: 10 INJECTION, EMULSION INTRAVENOUS at 14:41

## 2017-07-20 RX ADMIN — SODIUM CHLORIDE 8 MG/HR: 9 INJECTION, SOLUTION INTRAVENOUS at 19:21

## 2017-07-20 RX ADMIN — ALBUTEROL SULFATE 2.5 MG: 2.5 SOLUTION RESPIRATORY (INHALATION) at 12:32

## 2017-07-20 RX ADMIN — ALBUTEROL SULFATE 2.5 MG: 2.5 SOLUTION RESPIRATORY (INHALATION) at 08:18

## 2017-07-20 RX ADMIN — HYDRALAZINE HYDROCHLORIDE 10 MG: 10 TABLET, FILM COATED ORAL at 20:12

## 2017-07-20 ASSESSMENT — PAIN DESCRIPTION - PAIN TYPE: TYPE: ACUTE PAIN

## 2017-07-20 ASSESSMENT — PAIN DESCRIPTION - DESCRIPTORS: DESCRIPTORS: BURNING

## 2017-07-20 ASSESSMENT — PAIN DESCRIPTION - ONSET: ONSET: ON-GOING

## 2017-07-20 ASSESSMENT — PAIN DESCRIPTION - LOCATION: LOCATION: ABDOMEN

## 2017-07-20 ASSESSMENT — PAIN SCALES - GENERAL
PAINLEVEL_OUTOF10: 7
PAINLEVEL_OUTOF10: 0
PAINLEVEL_OUTOF10: 0
PAINLEVEL_OUTOF10: 7
PAINLEVEL_OUTOF10: 0
PAINLEVEL_OUTOF10: 0
PAINLEVEL_OUTOF10: 8
PAINLEVEL_OUTOF10: 0
PAINLEVEL_OUTOF10: 3

## 2017-07-20 ASSESSMENT — ENCOUNTER SYMPTOMS: SHORTNESS OF BREATH: 1

## 2017-07-20 ASSESSMENT — COPD QUESTIONNAIRES: CAT_SEVERITY: NO INTERVAL CHANGE

## 2017-07-21 LAB
GLUCOSE BLD-MCNC: 101 MG/DL (ref 70–108)
GLUCOSE BLD-MCNC: 103 MG/DL (ref 70–108)
GLUCOSE BLD-MCNC: 106 MG/DL (ref 70–108)
GLUCOSE BLD-MCNC: 110 MG/DL (ref 70–108)
GLUCOSE BLD-MCNC: 125 MG/DL (ref 70–108)
GLUCOSE BLD-MCNC: 139 MG/DL (ref 70–108)
GLUCOSE BLD-MCNC: 185 MG/DL (ref 70–108)
GLUCOSE BLD-MCNC: 227 MG/DL (ref 70–108)
GLUCOSE BLD-MCNC: 94 MG/DL (ref 70–108)
LIPASE: 183.6 U/L (ref 5.6–51.3)

## 2017-07-21 PROCEDURE — 97116 GAIT TRAINING THERAPY: CPT

## 2017-07-21 PROCEDURE — 97110 THERAPEUTIC EXERCISES: CPT

## 2017-07-21 PROCEDURE — 2580000003 HC RX 258: Performed by: INTERNAL MEDICINE

## 2017-07-21 PROCEDURE — 6360000002 HC RX W HCPCS: Performed by: INTERNAL MEDICINE

## 2017-07-21 PROCEDURE — 6370000000 HC RX 637 (ALT 250 FOR IP): Performed by: INTERNAL MEDICINE

## 2017-07-21 PROCEDURE — 94640 AIRWAY INHALATION TREATMENT: CPT

## 2017-07-21 PROCEDURE — C9113 INJ PANTOPRAZOLE SODIUM, VIA: HCPCS | Performed by: INTERNAL MEDICINE

## 2017-07-21 PROCEDURE — 97530 THERAPEUTIC ACTIVITIES: CPT

## 2017-07-21 PROCEDURE — 83690 ASSAY OF LIPASE: CPT

## 2017-07-21 PROCEDURE — 99233 SBSQ HOSP IP/OBS HIGH 50: CPT | Performed by: INTERNAL MEDICINE

## 2017-07-21 PROCEDURE — 36415 COLL VENOUS BLD VENIPUNCTURE: CPT

## 2017-07-21 PROCEDURE — 99232 SBSQ HOSP IP/OBS MODERATE 35: CPT | Performed by: SURGERY

## 2017-07-21 PROCEDURE — 6360000002 HC RX W HCPCS: Performed by: ANESTHESIOLOGY

## 2017-07-21 PROCEDURE — 2060000000 HC ICU INTERMEDIATE R&B

## 2017-07-21 PROCEDURE — 82948 REAGENT STRIP/BLOOD GLUCOSE: CPT

## 2017-07-21 RX ORDER — INSULIN GLARGINE 100 [IU]/ML
30 INJECTION, SOLUTION SUBCUTANEOUS DAILY
Status: DISCONTINUED | OUTPATIENT
Start: 2017-07-21 | End: 2017-07-22

## 2017-07-21 RX ADMIN — HYDRALAZINE HYDROCHLORIDE 10 MG: 10 TABLET, FILM COATED ORAL at 08:59

## 2017-07-21 RX ADMIN — ALBUTEROL SULFATE 2.5 MG: 2.5 SOLUTION RESPIRATORY (INHALATION) at 10:31

## 2017-07-21 RX ADMIN — Medication 4 UNITS: at 16:42

## 2017-07-21 RX ADMIN — SPIRONOLACTONE 25 MG: 25 TABLET, FILM COATED ORAL at 08:59

## 2017-07-21 RX ADMIN — SUCRALFATE 1 G: 1 SUSPENSION ORAL at 06:08

## 2017-07-21 RX ADMIN — Medication 2 UNITS: at 12:35

## 2017-07-21 RX ADMIN — HYDROMORPHONE HYDROCHLORIDE 1 MG: 1 INJECTION, SOLUTION INTRAMUSCULAR; INTRAVENOUS; SUBCUTANEOUS at 21:35

## 2017-07-21 RX ADMIN — SODIUM CHLORIDE 8 MG/HR: 9 INJECTION, SOLUTION INTRAVENOUS at 06:08

## 2017-07-21 RX ADMIN — LOSARTAN POTASSIUM 25 MG: 25 TABLET, FILM COATED ORAL at 08:59

## 2017-07-21 RX ADMIN — HYDRALAZINE HYDROCHLORIDE 10 MG: 10 TABLET, FILM COATED ORAL at 20:37

## 2017-07-21 RX ADMIN — HEPARIN SODIUM 5000 UNITS: 5000 INJECTION, SOLUTION INTRAVENOUS; SUBCUTANEOUS at 08:59

## 2017-07-21 RX ADMIN — TIOTROPIUM BROMIDE 18 MCG: 18 CAPSULE ORAL; RESPIRATORY (INHALATION) at 10:31

## 2017-07-21 RX ADMIN — ALBUTEROL SULFATE 2.5 MG: 2.5 SOLUTION RESPIRATORY (INHALATION) at 13:32

## 2017-07-21 RX ADMIN — CARVEDILOL 12.5 MG: 6.25 TABLET, FILM COATED ORAL at 08:59

## 2017-07-21 RX ADMIN — SODIUM CHLORIDE 8 MG/HR: 9 INJECTION, SOLUTION INTRAVENOUS at 17:42

## 2017-07-21 RX ADMIN — INSULIN GLARGINE 30 UNITS: 100 INJECTION, SOLUTION SUBCUTANEOUS at 08:59

## 2017-07-21 RX ADMIN — CARVEDILOL 12.5 MG: 6.25 TABLET, FILM COATED ORAL at 20:37

## 2017-07-21 RX ADMIN — ASPIRIN 81 MG: 81 TABLET ORAL at 11:52

## 2017-07-21 RX ADMIN — ALBUTEROL SULFATE 2.5 MG: 2.5 SOLUTION RESPIRATORY (INHALATION) at 17:46

## 2017-07-21 RX ADMIN — HEPARIN SODIUM 5000 UNITS: 5000 INJECTION, SOLUTION INTRAVENOUS; SUBCUTANEOUS at 20:38

## 2017-07-21 RX ADMIN — BUMETANIDE 2 MG: 1 TABLET ORAL at 08:59

## 2017-07-21 RX ADMIN — SUCRALFATE 1 G: 1 SUSPENSION ORAL at 16:42

## 2017-07-21 RX ADMIN — HYDROMORPHONE HYDROCHLORIDE 1 MG: 1 INJECTION, SOLUTION INTRAMUSCULAR; INTRAVENOUS; SUBCUTANEOUS at 11:53

## 2017-07-21 RX ADMIN — SUCRALFATE 1 G: 1 SUSPENSION ORAL at 12:35

## 2017-07-21 RX ADMIN — HYDRALAZINE HYDROCHLORIDE 10 MG: 10 TABLET, FILM COATED ORAL at 14:09

## 2017-07-21 RX ADMIN — HYDROMORPHONE HYDROCHLORIDE 1 MG: 1 INJECTION, SOLUTION INTRAMUSCULAR; INTRAVENOUS; SUBCUTANEOUS at 01:58

## 2017-07-21 RX ADMIN — PRAVASTATIN SODIUM 80 MG: 80 TABLET ORAL at 20:37

## 2017-07-21 RX ADMIN — LEVOTHYROXINE SODIUM 25 MCG: 25 TABLET ORAL at 08:59

## 2017-07-21 ASSESSMENT — PAIN SCALES - GENERAL
PAINLEVEL_OUTOF10: 0
PAINLEVEL_OUTOF10: 6
PAINLEVEL_OUTOF10: 3
PAINLEVEL_OUTOF10: 7
PAINLEVEL_OUTOF10: 0
PAINLEVEL_OUTOF10: 8
PAINLEVEL_OUTOF10: 0
PAINLEVEL_OUTOF10: 8
PAINLEVEL_OUTOF10: 0

## 2017-07-21 ASSESSMENT — PAIN DESCRIPTION - DESCRIPTORS
DESCRIPTORS: ACHING
DESCRIPTORS: ACHING

## 2017-07-21 ASSESSMENT — PAIN DESCRIPTION - PAIN TYPE
TYPE: ACUTE PAIN
TYPE: ACUTE PAIN

## 2017-07-21 ASSESSMENT — PAIN DESCRIPTION - PROGRESSION
CLINICAL_PROGRESSION: GRADUALLY WORSENING
CLINICAL_PROGRESSION: GRADUALLY WORSENING

## 2017-07-21 ASSESSMENT — PAIN DESCRIPTION - LOCATION
LOCATION: ABDOMEN

## 2017-07-21 ASSESSMENT — PAIN DESCRIPTION - ONSET: ONSET: ON-GOING

## 2017-07-22 LAB
ANION GAP SERPL CALCULATED.3IONS-SCNC: 10 MEQ/L (ref 8–16)
BUN BLDV-MCNC: 10 MG/DL (ref 7–22)
CALCIUM SERPL-MCNC: 7.9 MG/DL (ref 8.5–10.5)
CHLORIDE BLD-SCNC: 103 MEQ/L (ref 98–111)
CO2: 29 MEQ/L (ref 23–33)
CREAT SERPL-MCNC: 1 MG/DL (ref 0.4–1.2)
GFR SERPL CREATININE-BSD FRML MDRD: 74 ML/MIN/1.73M2
GLUCOSE BLD-MCNC: 111 MG/DL (ref 70–108)
GLUCOSE BLD-MCNC: 136 MG/DL (ref 70–108)
GLUCOSE BLD-MCNC: 204 MG/DL (ref 70–108)
GLUCOSE BLD-MCNC: 265 MG/DL (ref 70–108)
GLUCOSE BLD-MCNC: 285 MG/DL (ref 70–108)
GLUCOSE BLD-MCNC: 48 MG/DL (ref 70–108)
GLUCOSE BLD-MCNC: 67 MG/DL (ref 70–108)
LIPASE: 136 U/L (ref 5.6–51.3)
POTASSIUM SERPL-SCNC: 3.6 MEQ/L (ref 3.5–5.2)
SODIUM BLD-SCNC: 142 MEQ/L (ref 135–145)

## 2017-07-22 PROCEDURE — 6360000002 HC RX W HCPCS: Performed by: INTERNAL MEDICINE

## 2017-07-22 PROCEDURE — 6370000000 HC RX 637 (ALT 250 FOR IP): Performed by: INTERNAL MEDICINE

## 2017-07-22 PROCEDURE — 36415 COLL VENOUS BLD VENIPUNCTURE: CPT

## 2017-07-22 PROCEDURE — 2060000000 HC ICU INTERMEDIATE R&B

## 2017-07-22 PROCEDURE — C9113 INJ PANTOPRAZOLE SODIUM, VIA: HCPCS | Performed by: INTERNAL MEDICINE

## 2017-07-22 PROCEDURE — 99232 SBSQ HOSP IP/OBS MODERATE 35: CPT | Performed by: SURGERY

## 2017-07-22 PROCEDURE — 2580000003 HC RX 258: Performed by: INTERNAL MEDICINE

## 2017-07-22 PROCEDURE — 80048 BASIC METABOLIC PNL TOTAL CA: CPT

## 2017-07-22 PROCEDURE — 83690 ASSAY OF LIPASE: CPT

## 2017-07-22 PROCEDURE — 82948 REAGENT STRIP/BLOOD GLUCOSE: CPT

## 2017-07-22 PROCEDURE — 99232 SBSQ HOSP IP/OBS MODERATE 35: CPT | Performed by: INTERNAL MEDICINE

## 2017-07-22 PROCEDURE — 94640 AIRWAY INHALATION TREATMENT: CPT

## 2017-07-22 RX ORDER — INSULIN GLARGINE 100 [IU]/ML
28 INJECTION, SOLUTION SUBCUTANEOUS DAILY
Status: DISCONTINUED | OUTPATIENT
Start: 2017-07-23 | End: 2017-07-23

## 2017-07-22 RX ORDER — LOSARTAN POTASSIUM 25 MG/1
25 TABLET ORAL ONCE
Status: COMPLETED | OUTPATIENT
Start: 2017-07-22 | End: 2017-07-22

## 2017-07-22 RX ORDER — LOSARTAN POTASSIUM 50 MG/1
50 TABLET ORAL DAILY
Status: DISCONTINUED | OUTPATIENT
Start: 2017-07-23 | End: 2017-07-23

## 2017-07-22 RX ORDER — PANTOPRAZOLE SODIUM 40 MG/1
40 TABLET, DELAYED RELEASE ORAL
Status: DISCONTINUED | OUTPATIENT
Start: 2017-07-22 | End: 2017-07-23 | Stop reason: HOSPADM

## 2017-07-22 RX ADMIN — Medication 4 UNITS: at 13:03

## 2017-07-22 RX ADMIN — HYDRALAZINE HYDROCHLORIDE 10 MG: 10 TABLET, FILM COATED ORAL at 08:11

## 2017-07-22 RX ADMIN — INSULIN GLARGINE 30 UNITS: 100 INJECTION, SOLUTION SUBCUTANEOUS at 08:13

## 2017-07-22 RX ADMIN — ALBUTEROL SULFATE 2.5 MG: 2.5 SOLUTION RESPIRATORY (INHALATION) at 17:28

## 2017-07-22 RX ADMIN — LOSARTAN POTASSIUM 25 MG: 25 TABLET, FILM COATED ORAL at 08:11

## 2017-07-22 RX ADMIN — SUCRALFATE 1 G: 1 SUSPENSION ORAL at 13:01

## 2017-07-22 RX ADMIN — HYDRALAZINE HYDROCHLORIDE 10 MG: 10 TABLET, FILM COATED ORAL at 21:27

## 2017-07-22 RX ADMIN — TIOTROPIUM BROMIDE 18 MCG: 18 CAPSULE ORAL; RESPIRATORY (INHALATION) at 10:04

## 2017-07-22 RX ADMIN — ALBUTEROL SULFATE 2.5 MG: 2.5 SOLUTION RESPIRATORY (INHALATION) at 21:08

## 2017-07-22 RX ADMIN — SPIRONOLACTONE 25 MG: 25 TABLET, FILM COATED ORAL at 08:11

## 2017-07-22 RX ADMIN — PANTOPRAZOLE SODIUM 40 MG: 40 TABLET, DELAYED RELEASE ORAL at 16:18

## 2017-07-22 RX ADMIN — ASPIRIN 81 MG: 81 TABLET ORAL at 08:11

## 2017-07-22 RX ADMIN — LEVOTHYROXINE SODIUM 25 MCG: 25 TABLET ORAL at 08:11

## 2017-07-22 RX ADMIN — INSULIN LISPRO 3 UNITS: 100 INJECTION, SOLUTION INTRAVENOUS; SUBCUTANEOUS at 18:12

## 2017-07-22 RX ADMIN — SUCRALFATE 1 G: 1 SUSPENSION ORAL at 00:41

## 2017-07-22 RX ADMIN — ALBUTEROL SULFATE 2.5 MG: 2.5 SOLUTION RESPIRATORY (INHALATION) at 09:54

## 2017-07-22 RX ADMIN — LOSARTAN POTASSIUM 25 MG: 25 TABLET, FILM COATED ORAL at 13:02

## 2017-07-22 RX ADMIN — HEPARIN SODIUM 5000 UNITS: 5000 INJECTION, SOLUTION INTRAVENOUS; SUBCUTANEOUS at 08:13

## 2017-07-22 RX ADMIN — HEPARIN SODIUM 5000 UNITS: 5000 INJECTION, SOLUTION INTRAVENOUS; SUBCUTANEOUS at 21:28

## 2017-07-22 RX ADMIN — PRAVASTATIN SODIUM 80 MG: 80 TABLET ORAL at 21:28

## 2017-07-22 RX ADMIN — SODIUM CHLORIDE 8 MG/HR: 9 INJECTION, SOLUTION INTRAVENOUS at 06:34

## 2017-07-22 RX ADMIN — SUCRALFATE 1 G: 1 SUSPENSION ORAL at 06:34

## 2017-07-22 RX ADMIN — BUMETANIDE 2 MG: 1 TABLET ORAL at 08:11

## 2017-07-22 RX ADMIN — CARVEDILOL 12.5 MG: 6.25 TABLET, FILM COATED ORAL at 08:11

## 2017-07-22 RX ADMIN — SUCRALFATE 1 G: 1 SUSPENSION ORAL at 23:57

## 2017-07-22 RX ADMIN — Medication 6 UNITS: at 18:11

## 2017-07-22 RX ADMIN — SUCRALFATE 1 G: 1 SUSPENSION ORAL at 18:09

## 2017-07-22 RX ADMIN — CARVEDILOL 12.5 MG: 6.25 TABLET, FILM COATED ORAL at 21:27

## 2017-07-22 RX ADMIN — ALBUTEROL SULFATE 2.5 MG: 2.5 SOLUTION RESPIRATORY (INHALATION) at 13:39

## 2017-07-22 RX ADMIN — HYDRALAZINE HYDROCHLORIDE 10 MG: 10 TABLET, FILM COATED ORAL at 13:02

## 2017-07-22 ASSESSMENT — PAIN DESCRIPTION - LOCATION: LOCATION: ABDOMEN

## 2017-07-22 ASSESSMENT — PAIN SCALES - GENERAL
PAINLEVEL_OUTOF10: 3
PAINLEVEL_OUTOF10: 0
PAINLEVEL_OUTOF10: 0

## 2017-07-22 ASSESSMENT — PAIN DESCRIPTION - PAIN TYPE: TYPE: ACUTE PAIN

## 2017-07-23 VITALS
HEART RATE: 86 BPM | SYSTOLIC BLOOD PRESSURE: 127 MMHG | WEIGHT: 195.8 LBS | DIASTOLIC BLOOD PRESSURE: 70 MMHG | RESPIRATION RATE: 16 BRPM | OXYGEN SATURATION: 93 % | TEMPERATURE: 98 F | BODY MASS INDEX: 29 KG/M2 | HEIGHT: 69 IN

## 2017-07-23 LAB
GLUCOSE BLD-MCNC: 181 MG/DL (ref 70–108)
GLUCOSE BLD-MCNC: 260 MG/DL (ref 70–108)

## 2017-07-23 PROCEDURE — 82948 REAGENT STRIP/BLOOD GLUCOSE: CPT

## 2017-07-23 PROCEDURE — 99231 SBSQ HOSP IP/OBS SF/LOW 25: CPT | Performed by: SURGERY

## 2017-07-23 PROCEDURE — 6370000000 HC RX 637 (ALT 250 FOR IP): Performed by: INTERNAL MEDICINE

## 2017-07-23 PROCEDURE — 6360000002 HC RX W HCPCS: Performed by: INTERNAL MEDICINE

## 2017-07-23 PROCEDURE — 94640 AIRWAY INHALATION TREATMENT: CPT

## 2017-07-23 PROCEDURE — 99238 HOSP IP/OBS DSCHRG MGMT 30/<: CPT | Performed by: INTERNAL MEDICINE

## 2017-07-23 RX ORDER — LOSARTAN POTASSIUM 100 MG/1
100 TABLET ORAL DAILY
Qty: 30 TABLET | Refills: 0 | Status: ON HOLD | OUTPATIENT
Start: 2017-07-23 | End: 2017-09-08

## 2017-07-23 RX ORDER — CALCIUM CARB/VITAMIN D3/VIT K1 500-100-40
1 TABLET,CHEWABLE ORAL DAILY
Qty: 100 EACH | Refills: 0 | Status: SHIPPED | OUTPATIENT
Start: 2017-07-23

## 2017-07-23 RX ORDER — LANCETS 30 GAUGE
1 EACH MISCELLANEOUS DAILY
Qty: 100 EACH | Refills: 0 | Status: SHIPPED | OUTPATIENT
Start: 2017-07-23

## 2017-07-23 RX ORDER — INSULIN GLARGINE 100 [IU]/ML
25 INJECTION, SOLUTION SUBCUTANEOUS DAILY
Qty: 1 VIAL | Refills: 0 | Status: ON HOLD | OUTPATIENT
Start: 2017-07-23 | End: 2017-09-08

## 2017-07-23 RX ORDER — LOSARTAN POTASSIUM 100 MG/1
100 TABLET ORAL DAILY
Status: DISCONTINUED | OUTPATIENT
Start: 2017-07-23 | End: 2017-07-23 | Stop reason: HOSPADM

## 2017-07-23 RX ORDER — INSULIN GLARGINE 100 [IU]/ML
25 INJECTION, SOLUTION SUBCUTANEOUS DAILY
Status: DISCONTINUED | OUTPATIENT
Start: 2017-07-23 | End: 2017-07-23 | Stop reason: HOSPADM

## 2017-07-23 RX ORDER — BLOOD-GLUCOSE METER
KIT MISCELLANEOUS
Qty: 1 KIT | Refills: 0 | Status: SHIPPED | OUTPATIENT
Start: 2017-07-23

## 2017-07-23 RX ADMIN — ASPIRIN 81 MG: 81 TABLET ORAL at 09:19

## 2017-07-23 RX ADMIN — HYDRALAZINE HYDROCHLORIDE 10 MG: 10 TABLET, FILM COATED ORAL at 09:19

## 2017-07-23 RX ADMIN — SUCRALFATE 1 G: 1 SUSPENSION ORAL at 12:33

## 2017-07-23 RX ADMIN — HYDRALAZINE HYDROCHLORIDE 10 MG: 10 TABLET, FILM COATED ORAL at 12:33

## 2017-07-23 RX ADMIN — Medication 2 UNITS: at 09:37

## 2017-07-23 RX ADMIN — ALBUTEROL SULFATE 2.5 MG: 2.5 SOLUTION RESPIRATORY (INHALATION) at 15:40

## 2017-07-23 RX ADMIN — LEVOTHYROXINE SODIUM 25 MCG: 25 TABLET ORAL at 09:19

## 2017-07-23 RX ADMIN — BUMETANIDE 2 MG: 1 TABLET ORAL at 09:19

## 2017-07-23 RX ADMIN — LOSARTAN POTASSIUM 100 MG: 100 TABLET, FILM COATED ORAL at 09:19

## 2017-07-23 RX ADMIN — SPIRONOLACTONE 25 MG: 25 TABLET, FILM COATED ORAL at 09:19

## 2017-07-23 RX ADMIN — SUCRALFATE 1 G: 1 SUSPENSION ORAL at 06:05

## 2017-07-23 RX ADMIN — PANTOPRAZOLE SODIUM 40 MG: 40 TABLET, DELAYED RELEASE ORAL at 06:05

## 2017-07-23 RX ADMIN — HEPARIN SODIUM 5000 UNITS: 5000 INJECTION, SOLUTION INTRAVENOUS; SUBCUTANEOUS at 09:19

## 2017-07-23 RX ADMIN — ALBUTEROL SULFATE 2.5 MG: 2.5 SOLUTION RESPIRATORY (INHALATION) at 07:50

## 2017-07-23 RX ADMIN — ALBUTEROL SULFATE 2.5 MG: 2.5 SOLUTION RESPIRATORY (INHALATION) at 12:20

## 2017-07-23 RX ADMIN — Medication 6 UNITS: at 12:32

## 2017-07-23 RX ADMIN — TIOTROPIUM BROMIDE 18 MCG: 18 CAPSULE ORAL; RESPIRATORY (INHALATION) at 08:00

## 2017-07-23 RX ADMIN — CARVEDILOL 12.5 MG: 6.25 TABLET, FILM COATED ORAL at 09:19

## 2017-07-23 RX ADMIN — INSULIN GLARGINE 25 UNITS: 100 INJECTION, SOLUTION SUBCUTANEOUS at 09:36

## 2017-07-23 ASSESSMENT — PAIN SCALES - GENERAL: PAINLEVEL_OUTOF10: 0

## 2017-08-06 ENCOUNTER — HOSPITAL ENCOUNTER (OUTPATIENT)
Age: 67
Discharge: HOME | End: 2017-08-06
Payer: MEDICARE

## 2017-08-06 LAB — GLUCOSE, TWO-HOUR POSTPRANDIAL: 132 MG/DL (ref 70–108)

## 2017-08-06 PROCEDURE — 82947 ASSAY GLUCOSE BLOOD QUANT: CPT

## 2017-08-06 PROCEDURE — 36415 COLL VENOUS BLD VENIPUNCTURE: CPT

## 2017-08-06 PROCEDURE — 82985 ASSAY OF GLYCATED PROTEIN: CPT

## 2017-08-08 LAB — FRUCTOSAMINE: 411 UMOL/L (ref 170–285)

## 2017-08-09 ENCOUNTER — HOSPITAL ENCOUNTER (OUTPATIENT)
Age: 67
Discharge: HOME | End: 2017-08-09
Payer: MEDICARE

## 2017-08-09 ENCOUNTER — OFFICE VISIT (OUTPATIENT)
Dept: SURGERY | Age: 67
End: 2017-08-09
Payer: MEDICARE

## 2017-08-09 VITALS
OXYGEN SATURATION: 97 % | BODY MASS INDEX: 26.25 KG/M2 | HEART RATE: 82 BPM | SYSTOLIC BLOOD PRESSURE: 118 MMHG | HEIGHT: 69 IN | WEIGHT: 177.2 LBS | DIASTOLIC BLOOD PRESSURE: 72 MMHG | RESPIRATION RATE: 18 BRPM | TEMPERATURE: 96.2 F

## 2017-08-09 DIAGNOSIS — K82.8 BILIARY DYSKINESIA: ICD-10-CM

## 2017-08-09 DIAGNOSIS — K80.20 CALCULUS OF GALLBLADDER WITHOUT CHOLECYSTITIS WITHOUT OBSTRUCTION: Primary | ICD-10-CM

## 2017-08-09 DIAGNOSIS — K85.00 IDIOPATHIC ACUTE PANCREATITIS WITHOUT INFECTION OR NECROSIS: ICD-10-CM

## 2017-08-09 DIAGNOSIS — K42.9 UMBILICAL HERNIA WITHOUT OBSTRUCTION AND WITHOUT GANGRENE: ICD-10-CM

## 2017-08-09 LAB
ALBUMIN SERPL-MCNC: 3.7 G/DL (ref 3.5–5.1)
ALP BLD-CCNC: 111 U/L (ref 38–126)
ALT SERPL-CCNC: 22 U/L (ref 11–66)
AST SERPL-CCNC: 24 U/L (ref 5–40)
BILIRUB SERPL-MCNC: 0.2 MG/DL (ref 0.3–1.2)
BILIRUBIN DIRECT: < 0.2 MG/DL (ref 0–0.3)
TOTAL PROTEIN: 6.5 G/DL (ref 6.1–8)

## 2017-08-09 PROCEDURE — G8427 DOCREV CUR MEDS BY ELIG CLIN: HCPCS | Performed by: SURGERY

## 2017-08-09 PROCEDURE — 1111F DSCHRG MED/CURRENT MED MERGE: CPT | Performed by: SURGERY

## 2017-08-09 PROCEDURE — 1036F TOBACCO NON-USER: CPT | Performed by: SURGERY

## 2017-08-09 PROCEDURE — 1123F ACP DISCUSS/DSCN MKR DOCD: CPT | Performed by: SURGERY

## 2017-08-09 PROCEDURE — 3017F COLORECTAL CA SCREEN DOC REV: CPT | Performed by: SURGERY

## 2017-08-09 PROCEDURE — G8598 ASA/ANTIPLAT THER USED: HCPCS | Performed by: SURGERY

## 2017-08-09 PROCEDURE — 80076 HEPATIC FUNCTION PANEL: CPT

## 2017-08-09 PROCEDURE — 4040F PNEUMOC VAC/ADMIN/RCVD: CPT | Performed by: SURGERY

## 2017-08-09 PROCEDURE — 99214 OFFICE O/P EST MOD 30 MIN: CPT | Performed by: SURGERY

## 2017-08-09 PROCEDURE — G8419 CALC BMI OUT NRM PARAM NOF/U: HCPCS | Performed by: SURGERY

## 2017-08-09 PROCEDURE — 36415 COLL VENOUS BLD VENIPUNCTURE: CPT

## 2017-08-09 RX ORDER — HYDROCHLOROTHIAZIDE 25 MG/1
25 TABLET ORAL DAILY
Status: ON HOLD | COMMUNITY
End: 2017-09-08

## 2017-08-09 RX ORDER — FUROSEMIDE 40 MG/1
40 TABLET ORAL DAILY
Status: ON HOLD | COMMUNITY
End: 2017-09-08

## 2017-08-09 RX ORDER — METOPROLOL SUCCINATE 50 MG/1
50 TABLET, EXTENDED RELEASE ORAL DAILY
Status: ON HOLD | COMMUNITY
End: 2017-09-08

## 2017-08-09 ASSESSMENT — ENCOUNTER SYMPTOMS
EYE DISCHARGE: 0
FACIAL SWELLING: 0
CHOKING: 0
EYE PAIN: 0
WHEEZING: 0
COLOR CHANGE: 0
EYE REDNESS: 0
EYE ITCHING: 0
SINUS PRESSURE: 0
SORE THROAT: 0
ABDOMINAL PAIN: 0
APNEA: 0
ABDOMINAL DISTENTION: 0
BLOOD IN STOOL: 0
VOMITING: 0
CONSTIPATION: 0
ANAL BLEEDING: 0
RHINORRHEA: 0
CHEST TIGHTNESS: 0
DIARRHEA: 0
SHORTNESS OF BREATH: 0
NAUSEA: 0
RECTAL PAIN: 0
PHOTOPHOBIA: 0
VOICE CHANGE: 0
BACK PAIN: 0
TROUBLE SWALLOWING: 0
COUGH: 0
STRIDOR: 0

## 2017-08-24 ENCOUNTER — HOSPITAL ENCOUNTER (OUTPATIENT)
Age: 67
Discharge: HOME | End: 2017-08-24
Payer: MEDICARE

## 2017-08-24 LAB — TSH SERPL DL<=0.05 MIU/L-ACNC: 6.71 UIU/ML (ref 0.4–4.2)

## 2017-08-24 PROCEDURE — 84443 ASSAY THYROID STIM HORMONE: CPT

## 2017-08-24 PROCEDURE — 36415 COLL VENOUS BLD VENIPUNCTURE: CPT

## 2017-09-07 ENCOUNTER — APPOINTMENT (OUTPATIENT)
Dept: GENERAL RADIOLOGY | Age: 67
DRG: 208 | End: 2017-09-07
Payer: MEDICARE

## 2017-09-07 ENCOUNTER — HOSPITAL ENCOUNTER (INPATIENT)
Age: 67
LOS: 9 days | Discharge: HOME | DRG: 208 | End: 2017-09-16
Attending: EMERGENCY MEDICINE | Admitting: INTERNAL MEDICINE
Payer: MEDICARE

## 2017-09-07 ENCOUNTER — TELEPHONE (OUTPATIENT)
Dept: SURGERY | Age: 67
End: 2017-09-07

## 2017-09-07 DIAGNOSIS — I50.9 CONGESTIVE HEART FAILURE, UNSPECIFIED CONGESTIVE HEART FAILURE CHRONICITY, UNSPECIFIED CONGESTIVE HEART FAILURE TYPE: ICD-10-CM

## 2017-09-07 DIAGNOSIS — E87.5 HYPERKALEMIA: ICD-10-CM

## 2017-09-07 DIAGNOSIS — R07.89 OTHER CHEST PAIN: Primary | ICD-10-CM

## 2017-09-07 DIAGNOSIS — N18.30 CKD (CHRONIC KIDNEY DISEASE), STAGE III (HCC): ICD-10-CM

## 2017-09-07 DIAGNOSIS — R06.02 SHORTNESS OF BREATH: ICD-10-CM

## 2017-09-07 DIAGNOSIS — J18.9 PNEUMONIA OF BOTH LOWER LOBES DUE TO INFECTIOUS ORGANISM: ICD-10-CM

## 2017-09-07 DIAGNOSIS — J90 PLEURAL EFFUSION: ICD-10-CM

## 2017-09-07 LAB
ALBUMIN SERPL-MCNC: 3.9 GM/DL (ref 3.5–5)
ALP BLD-CCNC: 97 U/L (ref 46–116)
ALT SERPL-CCNC: 48 U/L (ref 12–78)
ANION GAP: 10 MEQ/L (ref 8–16)
AST SERPL-CCNC: 28 U/L (ref 15–37)
BASOPHILS # BLD: 0.4 % (ref 0–3)
BILIRUB SERPL-MCNC: 0.5 MG/DL (ref 0.2–1)
BUN BLDV-MCNC: 51 MG/DL (ref 7–18)
CHLORIDE BLD-SCNC: 98 MEQ/L (ref 98–107)
CO2: 30 MEQ/L (ref 21–32)
CREAT SERPL-MCNC: 1.9 MG/DL (ref 0.8–1.3)
EOSINOPHILS RELATIVE PERCENT: 1.1 % (ref 0–4)
GFR, ESTIMATED: 37 ML/MIN/1.73M2
GLUCOSE BLD-MCNC: 139 MG/DL (ref 70–108)
GLUCOSE BLD-MCNC: 189 MG/DL (ref 70–108)
GLUCOSE BLD-MCNC: 273 MG/DL (ref 70–108)
GLUCOSE BLD-MCNC: 350 MG/DL (ref 74–106)
GLUCOSE BLD-MCNC: 361 MG/DL
GLUCOSE BLD-MCNC: 361 MG/DL (ref 70–108)
HCT VFR BLD CALC: 37 % (ref 42–52)
HEMOGLOBIN: 12.1 GM/DL (ref 14–18)
LIPASE: 309 U/L (ref 65–230)
LYMPHOCYTES # BLD: 8.2 % (ref 15–47)
MAGNESIUM: 2.3 MG/DL (ref 1.8–2.4)
MCH RBC QN AUTO: 28.4 PG (ref 27–31)
MCHC RBC AUTO-ENTMCNC: 32.7 GM/DL (ref 33–37)
MCV RBC AUTO: 86.8 FL (ref 80–94)
MONOCYTES: 10.9 % (ref 0–12)
PDW BLD-RTO: 12.6 % (ref 11.5–14.5)
PLATELET # BLD: 393 THOU/MM3 (ref 130–400)
PMV BLD AUTO: 7 MCM (ref 7.4–10.4)
POC CALCIUM: 9.3 MG/DL (ref 8.5–10.1)
POTASSIUM SERPL-SCNC: 4.5 MEQ/L (ref 3.5–5.1)
RBC # BLD: 4.26 MILL/MM3 (ref 4.7–6.1)
SEGS: 79.4 % (ref 43–75)
SODIUM BLD-SCNC: 138 MEQ/L (ref 136–145)
TOTAL PROTEIN: 7.9 GM/DL (ref 6.4–8.2)
TROPONIN I: 0.07 NG/ML
TROPONIN T: < 0.01 NG/ML
TROPONIN T: < 0.01 NG/ML
WBC # BLD: 10.4 THOU/MM3 (ref 4.8–10.8)

## 2017-09-07 PROCEDURE — 6370000000 HC RX 637 (ALT 250 FOR IP): Performed by: INTERNAL MEDICINE

## 2017-09-07 PROCEDURE — 6370000000 HC RX 637 (ALT 250 FOR IP): Performed by: EMERGENCY MEDICINE

## 2017-09-07 PROCEDURE — 71010 XR CHEST PORTABLE: CPT

## 2017-09-07 PROCEDURE — B41C1ZZ FLUOROSCOPY OF PELVIC ARTERIES USING LOW OSMOLAR CONTRAST: ICD-10-PCS | Performed by: INTERNAL MEDICINE

## 2017-09-07 PROCEDURE — 83735 ASSAY OF MAGNESIUM: CPT

## 2017-09-07 PROCEDURE — 80053 COMPREHEN METABOLIC PANEL: CPT

## 2017-09-07 PROCEDURE — 93005 ELECTROCARDIOGRAM TRACING: CPT

## 2017-09-07 PROCEDURE — B2111ZZ FLUOROSCOPY OF MULTIPLE CORONARY ARTERIES USING LOW OSMOLAR CONTRAST: ICD-10-PCS | Performed by: INTERNAL MEDICINE

## 2017-09-07 PROCEDURE — 0BH17EZ INSERTION OF ENDOTRACHEAL AIRWAY INTO TRACHEA, VIA NATURAL OR ARTIFICIAL OPENING: ICD-10-PCS | Performed by: INTERNAL MEDICINE

## 2017-09-07 PROCEDURE — 6360000002 HC RX W HCPCS: Performed by: INTERNAL MEDICINE

## 2017-09-07 PROCEDURE — 82948 REAGENT STRIP/BLOOD GLUCOSE: CPT

## 2017-09-07 PROCEDURE — 36415 COLL VENOUS BLD VENIPUNCTURE: CPT

## 2017-09-07 PROCEDURE — 2580000003 HC RX 258: Performed by: EMERGENCY MEDICINE

## 2017-09-07 PROCEDURE — 2060000000 HC ICU INTERMEDIATE R&B

## 2017-09-07 PROCEDURE — 96375 TX/PRO/DX INJ NEW DRUG ADDON: CPT

## 2017-09-07 PROCEDURE — 5A1945Z RESPIRATORY VENTILATION, 24-96 CONSECUTIVE HOURS: ICD-10-PCS | Performed by: INTERNAL MEDICINE

## 2017-09-07 PROCEDURE — 6360000002 HC RX W HCPCS: Performed by: EMERGENCY MEDICINE

## 2017-09-07 PROCEDURE — 4A023N7 MEASUREMENT OF CARDIAC SAMPLING AND PRESSURE, LEFT HEART, PERCUTANEOUS APPROACH: ICD-10-PCS | Performed by: INTERNAL MEDICINE

## 2017-09-07 PROCEDURE — 85025 COMPLETE CBC W/AUTO DIFF WBC: CPT

## 2017-09-07 PROCEDURE — 1200000003 HC TELEMETRY R&B

## 2017-09-07 PROCEDURE — 84484 ASSAY OF TROPONIN QUANT: CPT

## 2017-09-07 PROCEDURE — 96374 THER/PROPH/DIAG INJ IV PUSH: CPT

## 2017-09-07 PROCEDURE — 99223 1ST HOSP IP/OBS HIGH 75: CPT | Performed by: INTERNAL MEDICINE

## 2017-09-07 PROCEDURE — 94640 AIRWAY INHALATION TREATMENT: CPT

## 2017-09-07 PROCEDURE — B2151ZZ FLUOROSCOPY OF LEFT HEART USING LOW OSMOLAR CONTRAST: ICD-10-PCS | Performed by: INTERNAL MEDICINE

## 2017-09-07 PROCEDURE — 99285 EMERGENCY DEPT VISIT HI MDM: CPT

## 2017-09-07 PROCEDURE — 83690 ASSAY OF LIPASE: CPT

## 2017-09-07 RX ORDER — ALBUTEROL SULFATE 90 UG/1
2 AEROSOL, METERED RESPIRATORY (INHALATION) 4 TIMES DAILY
Status: DISCONTINUED | OUTPATIENT
Start: 2017-09-07 | End: 2017-09-11

## 2017-09-07 RX ORDER — MORPHINE SULFATE 2 MG/ML
2 INJECTION, SOLUTION INTRAMUSCULAR; INTRAVENOUS ONCE
Status: COMPLETED | OUTPATIENT
Start: 2017-09-08 | End: 2017-09-08

## 2017-09-07 RX ORDER — METOPROLOL TARTRATE 50 MG/1
25 TABLET, FILM COATED ORAL ONCE
Status: DISCONTINUED | OUTPATIENT
Start: 2017-09-07 | End: 2017-09-11

## 2017-09-07 RX ORDER — CARVEDILOL 6.25 MG/1
12.5 TABLET ORAL 2 TIMES DAILY WITH MEALS
Status: DISCONTINUED | OUTPATIENT
Start: 2017-09-07 | End: 2017-09-11

## 2017-09-07 RX ORDER — HYDRALAZINE HYDROCHLORIDE 10 MG/1
10 TABLET, FILM COATED ORAL 3 TIMES DAILY
Status: DISCONTINUED | OUTPATIENT
Start: 2017-09-07 | End: 2017-09-11

## 2017-09-07 RX ORDER — HEPARIN SODIUM 10000 [USP'U]/100ML
12 INJECTION, SOLUTION INTRAVENOUS CONTINUOUS
Status: DISCONTINUED | OUTPATIENT
Start: 2017-09-07 | End: 2017-09-14

## 2017-09-07 RX ORDER — MAGNESIUM HYDROXIDE/ALUMINUM HYDROXICE/SIMETHICONE 120; 1200; 1200 MG/30ML; MG/30ML; MG/30ML
30 SUSPENSION ORAL ONCE
Status: COMPLETED | OUTPATIENT
Start: 2017-09-07 | End: 2017-09-07

## 2017-09-07 RX ORDER — INSULIN GLARGINE 100 [IU]/ML
25 INJECTION, SOLUTION SUBCUTANEOUS DAILY
Status: DISCONTINUED | OUTPATIENT
Start: 2017-09-07 | End: 2017-09-10

## 2017-09-07 RX ORDER — LISINOPRIL 5 MG/1
5 TABLET ORAL DAILY
Status: DISCONTINUED | OUTPATIENT
Start: 2017-09-07 | End: 2017-09-07 | Stop reason: ALTCHOICE

## 2017-09-07 RX ORDER — NICOTINE POLACRILEX 4 MG
15 LOZENGE BUCCAL PRN
Status: DISCONTINUED | OUTPATIENT
Start: 2017-09-07 | End: 2017-09-17 | Stop reason: HOSPADM

## 2017-09-07 RX ORDER — LOSARTAN POTASSIUM 50 MG/1
50 TABLET ORAL DAILY
Status: DISCONTINUED | OUTPATIENT
Start: 2017-09-07 | End: 2017-09-09

## 2017-09-07 RX ORDER — DEXTROSE MONOHYDRATE 25 G/50ML
12.5 INJECTION, SOLUTION INTRAVENOUS PRN
Status: DISCONTINUED | OUTPATIENT
Start: 2017-09-07 | End: 2017-09-08 | Stop reason: DRUGHIGH

## 2017-09-07 RX ORDER — ACETAMINOPHEN 325 MG/1
650 TABLET ORAL EVERY 4 HOURS PRN
Status: DISCONTINUED | OUTPATIENT
Start: 2017-09-07 | End: 2017-09-14 | Stop reason: SDUPTHER

## 2017-09-07 RX ORDER — ACETAMINOPHEN 325 MG/1
650 TABLET ORAL EVERY 6 HOURS PRN
Status: DISCONTINUED | OUTPATIENT
Start: 2017-09-07 | End: 2017-09-07 | Stop reason: SDUPTHER

## 2017-09-07 RX ORDER — PRAVASTATIN SODIUM 80 MG/1
80 TABLET ORAL DAILY
Status: DISCONTINUED | OUTPATIENT
Start: 2017-09-07 | End: 2017-09-17 | Stop reason: HOSPADM

## 2017-09-07 RX ORDER — SPIRONOLACTONE 25 MG/1
25 TABLET ORAL DAILY
Status: DISCONTINUED | OUTPATIENT
Start: 2017-09-07 | End: 2017-09-08

## 2017-09-07 RX ORDER — POTASSIUM CHLORIDE 20 MEQ/1
40 TABLET, EXTENDED RELEASE ORAL PRN
Status: DISCONTINUED | OUTPATIENT
Start: 2017-09-07 | End: 2017-09-12

## 2017-09-07 RX ORDER — PANTOPRAZOLE SODIUM 40 MG/1
40 TABLET, DELAYED RELEASE ORAL
Status: DISCONTINUED | OUTPATIENT
Start: 2017-09-08 | End: 2017-09-11

## 2017-09-07 RX ORDER — NITROGLYCERIN 0.4 MG/1
0.4 TABLET SUBLINGUAL EVERY 5 MIN PRN
Status: DISCONTINUED | OUTPATIENT
Start: 2017-09-07 | End: 2017-09-14 | Stop reason: SDUPTHER

## 2017-09-07 RX ORDER — SODIUM CHLORIDE 9 MG/ML
INJECTION, SOLUTION INTRAVENOUS CONTINUOUS
Status: DISCONTINUED | OUTPATIENT
Start: 2017-09-07 | End: 2017-09-11

## 2017-09-07 RX ORDER — BUMETANIDE 1 MG/1
2 TABLET ORAL DAILY
Status: DISCONTINUED | OUTPATIENT
Start: 2017-09-07 | End: 2017-09-08

## 2017-09-07 RX ORDER — ALBUTEROL SULFATE 2.5 MG/3ML
2.5 SOLUTION RESPIRATORY (INHALATION) 4 TIMES DAILY
Status: DISCONTINUED | OUTPATIENT
Start: 2017-09-07 | End: 2017-09-07 | Stop reason: ALTCHOICE

## 2017-09-07 RX ORDER — NITROGLYCERIN 0.4 MG/1
0.4 TABLET SUBLINGUAL EVERY 5 MIN PRN
Status: DISCONTINUED | OUTPATIENT
Start: 2017-09-07 | End: 2017-09-07 | Stop reason: SDUPTHER

## 2017-09-07 RX ORDER — POTASSIUM CHLORIDE 20MEQ/15ML
40 LIQUID (ML) ORAL PRN
Status: DISCONTINUED | OUTPATIENT
Start: 2017-09-07 | End: 2017-09-12

## 2017-09-07 RX ORDER — SODIUM CHLORIDE 0.9 % (FLUSH) 0.9 %
10 SYRINGE (ML) INJECTION EVERY 12 HOURS SCHEDULED
Status: DISCONTINUED | OUTPATIENT
Start: 2017-09-07 | End: 2017-09-14 | Stop reason: SDUPTHER

## 2017-09-07 RX ORDER — ALBUTEROL SULFATE 90 UG/1
2 AEROSOL, METERED RESPIRATORY (INHALATION) EVERY 6 HOURS PRN
Status: DISCONTINUED | OUTPATIENT
Start: 2017-09-07 | End: 2017-09-11

## 2017-09-07 RX ORDER — SODIUM CHLORIDE 0.9 % (FLUSH) 0.9 %
10 SYRINGE (ML) INJECTION PRN
Status: DISCONTINUED | OUTPATIENT
Start: 2017-09-07 | End: 2017-09-14 | Stop reason: SDUPTHER

## 2017-09-07 RX ORDER — ASPIRIN 81 MG/1
81 TABLET, CHEWABLE ORAL DAILY
Status: DISCONTINUED | OUTPATIENT
Start: 2017-09-08 | End: 2017-09-17 | Stop reason: HOSPADM

## 2017-09-07 RX ORDER — FUROSEMIDE 40 MG/1
40 TABLET ORAL DAILY
Status: DISCONTINUED | OUTPATIENT
Start: 2017-09-07 | End: 2017-09-07 | Stop reason: ALTCHOICE

## 2017-09-07 RX ORDER — MORPHINE SULFATE 2 MG/ML
2 INJECTION, SOLUTION INTRAMUSCULAR; INTRAVENOUS ONCE
Status: COMPLETED | OUTPATIENT
Start: 2017-09-07 | End: 2017-09-07

## 2017-09-07 RX ORDER — DEXTROSE MONOHYDRATE 50 MG/ML
100 INJECTION, SOLUTION INTRAVENOUS PRN
Status: DISCONTINUED | OUTPATIENT
Start: 2017-09-07 | End: 2017-09-17 | Stop reason: HOSPADM

## 2017-09-07 RX ORDER — NITROGLYCERIN 20 MG/100ML
5 INJECTION INTRAVENOUS CONTINUOUS
Status: DISCONTINUED | OUTPATIENT
Start: 2017-09-07 | End: 2017-09-11

## 2017-09-07 RX ORDER — LEVOTHYROXINE SODIUM 0.03 MG/1
25 TABLET ORAL DAILY
Status: DISCONTINUED | OUTPATIENT
Start: 2017-09-07 | End: 2017-09-17 | Stop reason: HOSPADM

## 2017-09-07 RX ORDER — ASPIRIN 81 MG/1
324 TABLET, CHEWABLE ORAL ONCE
Status: COMPLETED | OUTPATIENT
Start: 2017-09-07 | End: 2017-09-07

## 2017-09-07 RX ORDER — METOPROLOL SUCCINATE 50 MG/1
50 TABLET, EXTENDED RELEASE ORAL DAILY
Status: DISCONTINUED | OUTPATIENT
Start: 2017-09-07 | End: 2017-09-07 | Stop reason: ALTCHOICE

## 2017-09-07 RX ORDER — HYDROCHLOROTHIAZIDE 25 MG/1
25 TABLET ORAL DAILY
Status: DISCONTINUED | OUTPATIENT
Start: 2017-09-08 | End: 2017-09-08

## 2017-09-07 RX ORDER — ONDANSETRON 2 MG/ML
4 INJECTION INTRAMUSCULAR; INTRAVENOUS EVERY 6 HOURS PRN
Status: DISCONTINUED | OUTPATIENT
Start: 2017-09-07 | End: 2017-09-14 | Stop reason: SDUPTHER

## 2017-09-07 RX ORDER — POTASSIUM CHLORIDE 7.45 MG/ML
10 INJECTION INTRAVENOUS PRN
Status: DISCONTINUED | OUTPATIENT
Start: 2017-09-07 | End: 2017-09-12

## 2017-09-07 RX ORDER — ASPIRIN 81 MG/1
81 TABLET ORAL DAILY
Status: DISCONTINUED | OUTPATIENT
Start: 2017-09-07 | End: 2017-09-07 | Stop reason: SDUPTHER

## 2017-09-07 RX ORDER — MORPHINE SULFATE 2 MG/ML
INJECTION, SOLUTION INTRAMUSCULAR; INTRAVENOUS
Status: DISPENSED
Start: 2017-09-07 | End: 2017-09-08

## 2017-09-07 RX ADMIN — Medication 2 PUFF: at 21:43

## 2017-09-07 RX ADMIN — PRAVASTATIN SODIUM 80 MG: 80 TABLET ORAL at 21:29

## 2017-09-07 RX ADMIN — SODIUM CHLORIDE: 9 INJECTION, SOLUTION INTRAVENOUS at 15:47

## 2017-09-07 RX ADMIN — ENOXAPARIN SODIUM 40 MG: 40 INJECTION SUBCUTANEOUS at 17:59

## 2017-09-07 RX ADMIN — MORPHINE SULFATE 2 MG: 2 INJECTION, SOLUTION INTRAMUSCULAR; INTRAVENOUS at 13:25

## 2017-09-07 RX ADMIN — ALUMINUM HYDROXIDE, MAGNESIUM HYDROXIDE, AND SIMETHICONE 30 ML: 200; 200; 20 SUSPENSION ORAL at 12:28

## 2017-09-07 RX ADMIN — INSULIN GLARGINE 25 UNITS: 100 INJECTION, SOLUTION SUBCUTANEOUS at 18:52

## 2017-09-07 RX ADMIN — CARVEDILOL 12.5 MG: 6.25 TABLET, FILM COATED ORAL at 18:00

## 2017-09-07 RX ADMIN — HYDRALAZINE HYDROCHLORIDE 10 MG: 10 TABLET, FILM COATED ORAL at 18:00

## 2017-09-07 RX ADMIN — Medication 2 PUFF: at 21:42

## 2017-09-07 RX ADMIN — Medication 7 UNITS: at 18:00

## 2017-09-07 RX ADMIN — MORPHINE SULFATE 2 MG: 2 INJECTION, SOLUTION INTRAMUSCULAR; INTRAVENOUS at 12:46

## 2017-09-07 RX ADMIN — SPIRONOLACTONE 25 MG: 25 TABLET, FILM COATED ORAL at 18:00

## 2017-09-07 RX ADMIN — NITROGLYCERIN 0.4 MG: 0.4 TABLET SUBLINGUAL at 12:13

## 2017-09-07 RX ADMIN — LOSARTAN POTASSIUM 50 MG: 50 TABLET, FILM COATED ORAL at 18:00

## 2017-09-07 RX ADMIN — INSULIN HUMAN 5 UNITS: 100 INJECTION, SOLUTION PARENTERAL at 12:41

## 2017-09-07 RX ADMIN — LEVOTHYROXINE SODIUM 25 MCG: 25 TABLET ORAL at 18:00

## 2017-09-07 RX ADMIN — BUMETANIDE 2 MG: 1 TABLET ORAL at 18:00

## 2017-09-07 RX ADMIN — HEPARIN SODIUM AND DEXTROSE 12 UNITS/KG/HR: 10000; 5 INJECTION INTRAVENOUS at 21:21

## 2017-09-07 RX ADMIN — SODIUM CHLORIDE: 9 INJECTION, SOLUTION INTRAVENOUS at 12:20

## 2017-09-07 RX ADMIN — ASPIRIN 81 MG 324 MG: 81 TABLET ORAL at 12:09

## 2017-09-07 ASSESSMENT — PAIN SCALES - GENERAL
PAINLEVEL_OUTOF10: 10
PAINLEVEL_OUTOF10: 7
PAINLEVEL_OUTOF10: 7
PAINLEVEL_OUTOF10: 8
PAINLEVEL_OUTOF10: 8

## 2017-09-07 ASSESSMENT — PAIN DESCRIPTION - PROGRESSION
CLINICAL_PROGRESSION: GRADUALLY IMPROVING
CLINICAL_PROGRESSION: NOT CHANGED
CLINICAL_PROGRESSION: GRADUALLY IMPROVING
CLINICAL_PROGRESSION: GRADUALLY IMPROVING

## 2017-09-07 ASSESSMENT — PAIN DESCRIPTION - LOCATION
LOCATION: CHEST

## 2017-09-07 ASSESSMENT — PAIN DESCRIPTION - FREQUENCY
FREQUENCY: CONTINUOUS

## 2017-09-07 ASSESSMENT — PAIN DESCRIPTION - ORIENTATION
ORIENTATION: MID

## 2017-09-07 ASSESSMENT — ENCOUNTER SYMPTOMS
ABDOMINAL PAIN: 1
SORE THROAT: 0
VOMITING: 0
COUGH: 0
SHORTNESS OF BREATH: 0
WHEEZING: 0
NAUSEA: 0

## 2017-09-07 ASSESSMENT — PAIN DESCRIPTION - PAIN TYPE
TYPE: ACUTE PAIN

## 2017-09-07 ASSESSMENT — PAIN DESCRIPTION - DESCRIPTORS
DESCRIPTORS: TIGHTNESS
DESCRIPTORS: ACHING
DESCRIPTORS: TIGHTNESS
DESCRIPTORS: TIGHTNESS

## 2017-09-07 ASSESSMENT — HEART SCORE: ECG: 1

## 2017-09-07 ASSESSMENT — PAIN DESCRIPTION - ONSET
ONSET: AWAKENED FROM SLEEP
ONSET: ON-GOING

## 2017-09-07 NOTE — IP AVS SNAPSHOT
After Visit Summary  (Discharge Instructions)    Medication List for Home    Based on the information you provided to us as well as any changes during this visit, the following is your updated medication list.  Compare this with your prescription bottles at home. If you have any questions or concerns, contact your primary care physician's office. Daily Medication List (This medication list can be shared with any healthcare provider who is helping you manage your medications)      There are NEW medications for you. START taking them after you leave the hospital        Last Dose    Next Dose Due AM NOON PM NIGHT    amoxicillin-clavulanate 500-125 MG per tablet   Commonly known as:  AUGMENTIN   Take 1 tablet by mouth 3 times daily for 10 days                  Tomorrow 9 am                             lisinopril 5 MG tablet   Commonly known as:  PRINIVIL;ZESTRIL   Take 1 tablet by mouth daily                5 mg on 9/16/2017  8:53 AM     Tomorrow 9am                            You told us you were taking these medications at home, but the amount or how often you take this medication has CHANGED        Last Dose    Next Dose Due AM NOON PM NIGHT    albuterol sulfate  (90 Base) MCG/ACT inhaler   Inhale 2 puffs into the lungs every 6 hours as needed for Wheezing   What changed:  Another medication with the same name was removed. Continue taking this medication, and follow the directions you see here.                 2 puffs on 9/10/2017  8:38 PM                            carvedilol 25 MG tablet   Commonly known as:  COREG   Take 1.5 tablets by mouth 2 times daily (with meals)   What changed:    - medication strength  - how much to take                37.5 mg on 9/16/2017  4:57 PM     Tomorrow 9am                              hydrALAZINE 25 MG tablet   Commonly known as:  APRESOLINE   Take 1 tablet by mouth 3 times daily   What changed:    - medication strength  - how much to take 25 mg on 9/16/2017  1:18 PM     Tonight at 9pm                                  These are medications you told us you were taking at home, CONTINUE taking them after you leave the hospital        Last Dose    Next Dose Due AM NOON PM NIGHT    acetaminophen 325 MG tablet   Commonly known as:  TYLENOL   Take 650 mg by mouth every 6 hours as needed for Pain                650 mg on 9/15/2017  4:39 AM                            aspirin 81 MG EC tablet   Take 81 mg by mouth daily. B COMPLEX PLUS PO   Take 1 tablet by mouth daily                                            bumetanide 1 MG tablet   Commonly known as:  BUMEX   Take 1 mg by mouth 2 times daily                1 mg on 9/16/2017  8:53 AM     Tonight 9pm                             BYDUREON 2 MG Pen   Generic drug:  Exenatide   Inject 2 mg into the skin once a week Takes on Sunday                                         CINNAMON PO   Take 2 capsules by mouth daily                                            CPAP Machine Misc   by Does not apply route Please change CPAP pressure to 13 cm H20.                                         glucose monitoring kit monitoring kit   Need one glucometer and 100 testing strips for 3-4 times per day testing.   Diagnosis is diabetes type 2                                         INSULIN SYRINGE .3CC/31GX5/16\" 31G X 5/16\" 0.3 ML Misc   1 each by Does not apply route daily                                         Lancets Misc   1 each by Does not apply route daily                                         levothyroxine 25 MCG tablet   Commonly known as:  SYNTHROID   Take 25 mcg by mouth daily                25 mcg on 9/16/2017  6:31 AM                               MULTI VITAMIN MENS PO   Take 1 tablet by mouth daily                                            omeprazole 20 MG delayed release capsule   Commonly known as:  PRILOSEC   Take 1 capsule by mouth every morning (before breakfast) Your diagnoses also included:  Elevated Lipase, Chest Pain, Diabetes Mellitus Due To Underlying Condition With Stage 3 Chronic Kidney Disease, With Long-Term Current Use Of Insulin (Hcc), Hyperkalemia, Leukocytosis, Unspecified, Normocytic Anemia, Renato (Obstructive Sleep Apnea), Lung Nodule, Type 2 Diabetes Mellitus With Hyperglycemia, With Long-Term Current Use Of Insulin (Hcc), Shortness Of Breath, Essential Hypertension, Chronic Obstructive Pulmonary Disease (Hcc), Mediastinal Lymphadenopathy, Anemia Of Chronic Kidney Failure, Acute On Chronic Combined Systolic (Congestive) And Diastolic (Congestive) Heart Failure (Hcc), Ckd (Chronic Kidney Disease), Stage Iii, Hyperglycemia, Acute Hypercapnic Respiratory Failure (Hcc), Pulmonary Edema Cardiac Cause (Hcc), Other Acute Kidney Failure (Hcc)      Visit Information     Date & Time Provider Department Dept. Phone    9/7/2017 Juany Allen DO [address] [place] [address] [phone]       Follow-up Appointments    Below is a list of your follow-up and future appointments. This may not be a complete list as you may have made appointments directly with providers that we are not aware of or your providers may have made some for you. Please call your providers to confirm appointments. It is important to keep your appointments. Please bring your current insurance card, photo ID, co-pay, and all medication bottles to your appointment. If self-pay, payment is expected at the time of service. Follow-up Information     Follow up with Paul Hemphill MD. Schedule an appointment as soon as possible for a visit in 1 week. Specialty:  Family Medicine    Contact information:    [place]   [phone]          Follow up with Cassi Og MD. Schedule an appointment as soon as possible for a visit in 2 weeks.     Specialties:  Cardiology, Internal Medicine Cardiovascular Disease    Contact information:    [address]          Follow up with Skylar Valerio MD. Schedule an appointment as soon as possible for a visit in 2 weeks. Specialty:  Infectious Diseases    Contact information:    [address], [place] [address]          Follow up with Caitie Dunn MD. Schedule an appointment as soon as possible for a visit in 3 weeks. Specialty:  Nephrology    Why:  with bmp in 1 week    Contact information:    [address] [facility]          Follow up with Kyle Keene MD.    Specialty:  Pulmonology    Why:  keep current appt with chest xray prior    Contact information:    [address] [place] [address]  [address] [facility]        Future Appointments     9/19/2017 10:00 AM     Appointment with [facility]I ROOM 2E05 at [address] [facility] [address] ([phone])   [address] [address]       9/19/2017 12:00 PM     Appointment with [facility] [address] at [place] [address] ([phone])   [address] [address]       9/20/2017     Lab:  Basic Metabolic Panel        4/52/4502     Lab:  Basic Metabolic Panel        9/61/2967     Imaging:  XR CHEST STANDARD TWO VW        9/26/2017 9:40 AM     Appointment with Kyle Keene MD at [address] ([phone])   Please arrive 15 minutes prior to appointment, bring photo ID and insurance card. [address] [facility] [address]       12/13/2017     Imaging:  XR Chest Standard TWO VW        7/13/2018 8:15 AM     Appointment with Renee Mckenna PA-C at [address] ([phone])   Please arrive 15 minutes prior to appointment time, bring insurance card and photo ID.   Please arrive 15 minutes prior to appointment time, bring insurance card and photo ID.   [address]  [address]  [facility] [address]         Preventive Care        Date Due    Hepatitis C screening is recommended for all adults regardless of risk factors born between [facility] at least once (lifetime) who have never been tested. 1950    Diabetic Foot Exam 3/7/1958    Eye Exam By An Eye Doctor 3/7/1958    Tetanus Combination Vaccine (1 - Tdap) 3/7/1967    Yearly Flu Vaccine (1) 9/1/2017    Hemoglobin A1C (Test For Long-Term Glucose Control) 12/12/2017    Cholesterol Screening 9/8/2018    Pneumococcal Vaccines (two) for all adults aged 72 and over (2 of 2 - PPSV23) 9/8/2018    Colonoscopy 6/30/2026                 Care Plan Once You Return Home    This section includes instructions you will need to follow once you leave the hospital.  Your care team will discuss these with you, so you and those caring for you know how to best care for your health needs at home. This section may also include educational information about certain health topics that may be of help to you. Discharge Instructions       -Follow with my ( Dr. Paul Corbin) clinic at [facility] by keeping scheduled appt on 9/26/17 with chest Xray PA and lateral views 2days before clinic visit to follow pneumonia. Pneumonia: Care Instructions  Your Care Instructions    Pneumonia is an infection of the lungs. Most cases are caused by infections from bacteria or viruses. Pneumonia may be mild or very severe. If it is caused by bacteria, you will be treated with antibiotics. It may take a few weeks to a few months to recover fully from pneumonia, depending on how sick you were and whether your overall health is good. Follow-up care is a key part of your treatment and safety. Be sure to make and go to all appointments, and call your doctor if you are having problems. Its also a good idea to know your test results and keep a list of the medicines you take. How can you care for yourself at home? · Take your antibiotics exactly as directed. Do not stop taking the medicine just because you are feeling better. You need to take the full course of antibiotics. · Take your medicines exactly as prescribed. Call your doctor if you think you are having a problem with your medicine. · Get plenty of rest and sleep. You may feel weak and tired for a while, but your energy level will improve with time. · To prevent dehydration, drink plenty of fluids, enough so that your urine is light yellow or clear like water. Choose water and other caffeine-free clear liquids until you feel better. If you have kidney, heart, or liver disease and have to limit fluids, talk with your doctor before you increase the amount of fluids you drink. · Take care of your cough so you can rest. A cough that brings up mucus from your lungs is common with pneumonia. It is one way your body gets rid of the infection. But if coughing keeps you from resting or causes severe fatigue and chest-wall pain, talk to your doctor. He or she may suggest that you take a medicine to reduce the cough. · Use a vaporizer or humidifier to add moisture to your bedroom. Follow the directions for cleaning the machine. · Do not smoke or allow others to smoke around you. Smoke will make your cough last longer. If you need help quitting, talk to your doctor about stop-smoking programs and medicines. These can increase your chances of quitting for good. · Take an over-the-counter pain medicine, such as acetaminophen (Tylenol), ibuprofen (Advil, Motrin), or naproxen (Aleve). Read and follow all instructions on the label. · Do not take two or more pain medicines at the same time unless the doctor told you to. Many pain medicines have acetaminophen, which is Tylenol. Too much acetaminophen (Tylenol) can be harmful. · If you were given a spirometer to measure how well your lungs are working, use it as instructed. This can help your doctor tell how your recovery is going.   · To prevent pneumonia in the future, talk to your doctor about getting a flu vaccine (once a year) and a pneumococcal vaccine (one time only for most people). When should you call for help? Call 911 anytime you think you may need emergency care. For example, call if:  · You have severe trouble breathing. Call your doctor now or seek immediate medical care if:  · You cough up dark brown or bloody mucus (sputum). · You have new or worse trouble breathing. · You are dizzy or lightheaded, or you feel like you may faint. Watch closely for changes in your health, and be sure to contact your doctor if:  · You have a new or higher fever. · You are coughing more deeply or more often. · You are not getting better after 2 days (48 hours). · You do not get better as expected. Where can you learn more? Go to https://[employer]. org and sign in to your [employer] account. Enter D336 in the "[employer]" box to learn more about \"Pneumonia: Care Instructions. \"     If you do not have an account, please click on the \"Sign Up Now\" link. Current as of: March 25, 2017  Content Version: 11.3  © 6005-7117 [employer]. Care instructions adapted under license by [facility] ([facility]). If you have questions about a medical condition or this instruction, always ask your healthcare professional. [place] [address] any warranty or liability for your use of this information. Procedures and Other Instructions     XR CHEST STANDARD TWO VW           XR Chest Standard TWO VW       Reason for exam:  To follow abnormal chest X-ray with hx of pneumonia. Labs and Other Follow-ups after Discharge     Basic Metabolic Panel           Basic Metabolic Panel                 Important information for a smoker       SMOKING: QUIT SMOKING. THIS IS THE MOST IMPORTANT ACTION YOU CAN TAKE TO IMPROVE YOUR CURRENT AND FUTURE HEALTH. Call the [facility] at [place] NOW ([phone])    Smoking harms nonsmokers.  When nonsmokers are around people who smoke, medical emergencies, dial 911. For questions regarding your [employer] account call [phone]. If you have a clinical question, please call your doctor's office. View your information online  ? Review your current list of  medications, immunization, and allergies. ? Review your future test results online . ? Review your discharge instructions provided by your caregivers at discharge    Certain functionality such as prescription refills, scheduling appointments or sending messages to your provider are not activated if your provider does not use [employer] in his/her office    For questions regarding your [employer] account call [phone]. If you have a clinical question, please call your doctor's office. The information on all pages of the After Visit Summary has been reviewed with me, the patient and/or responsible adult, by my health care provider(s). I had the opportunity to ask questions regarding this information. I understand I should dispose of my armband safely at home to protect my health information. A complete copy of the After Visit Summary has been given to me, the patient and/or responsible adult.            Patient Signature/Responsible Adult:____________________    Clinician Signature:_____________________    Date:_____________________    Time:_____________________

## 2017-09-07 NOTE — H&P
History & Physical        Patient:  Mariajose Arias  YOB: 1950    MRN: [id number]     Acct: [de-identified]    PCP: Piyush Washington MD    Date of Admission: 9/7/2017    Date of Service: Pt seen/examined on 9/07/2017  and Admitted to Inpatient with expected LOS greater than two midnights due to medical therapy. Chief Complaint:  Chest pain    History Of Present Illness:  (73 y.o. male who presented to [address] [place] [address] with 1 day history of chest pain that radiates to his left shoulder blade as well as epigastrium. Patient notes the pain awakened him from sleep. Pain was originally midsternal.  It is associated with shortness of breath and nausea. Patient rates his pain at 10 out of 10. He describes the pain as pressure, aching. He states it's somewhat worse on deep inspiration.   Patient has a history of multivessel coronary artery disease and also nonischemic cardiomyopathy with an ejection fraction of 20%  [facility] is [facility]      Past Medical History:          Diagnosis Date    Gonsalves's esophagus     CAD (coronary artery disease)     s/p stent placement    COPD (chronic obstructive pulmonary disease) ([facility] [address]     Hyperlipidemia     Hypertension     Lung nodule     Osteoarthritis     Sleep apnea     Tobacco abuse     Type II or unspecified type diabetes mellitus without mention of complication, not stated as uncontrolled        Past Surgical History:          Procedure Laterality Date    CARPAL TUNNEL RELEASE      CATARACT REMOVAL Bilateral 2012, 2016    COLONOSCOPY  2006,2010,2016    CORONARY ANGIOPLASTY WITH STENT PLACEMENT      last one 2004    ELBOW SURGERY      x 2    INGUINAL HERNIA REPAIR Bilateral     KNEE SURGERY Left     SC EGD TRANSORAL BIOPSY SINGLE/MULTIPLE Left 7/20/2017    EGD BIOPSY performed by Cookie Man MD at [address] [place] [address]      bilateral    UPPER GASTROINTESTINAL ENDOSCOPY 2006,2007,2010,2012, 2016       Medications Prior to Admission:      Prior to Admission medications    Medication Sig Start Date End Date Taking? Authorizing Provider   furosemide (LASIX) 40 MG tablet Take 40 mg by mouth daily     Historical Provider, MD   hydrochlorothiazide (HYDRODIURIL) 25 MG tablet Take 25 mg by mouth daily    Historical Provider, MD   metoprolol succinate (TOPROL XL) 50 MG extended release tablet Take 50 mg by mouth daily    Historical Provider, MD   insulin glargine (LANTUS) 100 UNIT/ML injection vial Inject 25 Units into the skin daily 7/23/17   Jojo De La O DO   insulin lispro (HUMALOG) 100 UNIT/ML injection vial Inject 5-17 Units into the skin 3 times daily (with meals) Glucose: Dose: If <139             5 units if eating  140-199 7 Units   200-249 9 Units   250-299 11 Units   300-349 13 Units   350-400 15 Units   Above 400       17 Units 7/23/17   Jojo De La O DO   losartan (COZAAR) 100 MG tablet Take 1 tablet by mouth daily  Patient taking differently: Take 50 mg by mouth daily  7/23/17   Jojo De La O DO   glucose monitoring kit (FREESTYLE) monitoring kit Need one glucometer and 100 testing strips for 3-4 times per day testing.   Diagnosis is diabetes type 2 7/23/17   Jojo De La O DO   Lancets MISC 1 each by Does not apply route daily 7/23/17   Jojo De La O DO   Insulin Syringe-Needle U-100 (INSULIN SYRINGE .3CC/31GX5/16\") 31G X 5/16\" 0.3 ML MISC 1 each by Does not apply route daily 7/23/17   Jojo De La O DO   carvedilol (COREG) 12.5 MG tablet Take 12.5 mg by mouth 2 times daily (with meals)    Historical Provider, MD   bumetanide (BUMEX) 1 MG tablet Take 2 mg by mouth daily    Historical Provider, MD   spironolactone (ALDACTONE) 25 MG tablet Take 25 mg by mouth daily    Historical Provider, MD   levothyroxine (SYNTHROID) 25 MCG tablet Take 25 mcg by mouth daily    Historical Provider, MD   albuterol sulfate  (90 BASE) MCG/ACT inhaler Inhale 2 puffs into the lungs every 6 hours as needed for Wheezing 1/31/17   Clemencia Fall MD   SPIRIVA HANDIHALER 18 MCG inhalation capsule INHALE 1 CAPSULE INTO THE LUNGS DAILY 1/19/17   Clemencia Fall MD   CPAP Machine MISC by Does not apply route Please change CPAP pressure to 13 cm H20. 1/6/17   Clemencia Fall MD   albuterol (PROVENTIL) (2.5 MG/3ML) 0.083% nebulizer solution Take 3 mLs by nebulization 4 times daily 9/23/16   Farnaz Cain MD   Exenatide (BYDUREON) 2 MG PEN Inject 2 mg into the skin once a week Takes on Sunday     Historical Provider, MD   acetaminophen (TYLENOL) 325 MG tablet Take 650 mg by mouth every 6 hours as needed for Pain    Historical Provider, MD   pravastatin (PRAVACHOL) 80 MG tablet Take 1 tablet by mouth daily 9/1/16   Angela Dunaway MD   omeprazole (PRILOSEC) 20 MG capsule Take 1 capsule by mouth every morning (before breakfast) 7/14/16   BLANKA Cronin   Multiple Vitamin (MULTI VITAMIN MENS PO) Take by mouth daily    Historical Provider, MD   hydrALAZINE (APRESOLINE) 10 MG tablet Take 10 mg by mouth 3 times daily. Historical Provider, MD   aspirin 81 MG EC tablet Take 81 mg by mouth daily. Historical Provider, MD       Allergies:  Amlodipine    Social History:      The patient currently lives At home with her family    TOBACCO:   reports that he quit smoking about 17 years ago. His smoking use included Cigarettes. He has a 20.00 pack-year smoking history. He has never used smokeless tobacco.  ETOH:   reports that he does not drink alcohol. Family History:       Reviewed in detail and negative for DM, CAD, Cancer, CVA. Positive as follows:        Problem Relation Age of Onset    Heart Disease Father     Cancer Brother 52     type unknown    Colon Cancer Neg Hx     Inflam Bowel Dis Neg Hx        Diet:  DIET LOW SODIUM 2 GM;    REVIEW OF SYSTEMS:   Pertinent positives as noted in the HPI. All other systems reviewed and negative.     PHYSICAL EXAM:    BP (!) 126/59 Pulse 84  Temp 98.4 °F (36.9 °C) (Oral)   Resp 18  Ht 5' 9\" (1.753 m)  Wt 174 lb 2.6 oz (79 kg)  SpO2 95%  BMI 25.72 kg/m2    General appearance:  No apparent distress, appears stated age and cooperative. HEENT:  Normal cephalic, atraumatic without obvious deformity. Pupils equal, round, and reactive to light. Extra ocular muscles intact. Conjunctivae/corneas clear. Neck: Supple, with full range of motion. No jugular venous distention. Trachea midline. Respiratory:  Normal respiratory effort. Clear to auscultation, bilaterally without Rales/Wheezes/Rhonchi. Cardiovascular:  Regular rate and rhythm with normal S1/S2 without murmurs, rubs or gallops. Abdomen: Soft, non-tender, non-distended with normal bowel sounds. Musculoskeletal:  No clubbing, cyanosis or edema bilaterally. Full range of motion without deformity. Skin: Skin color, texture, turgor normal.  No rashes or lesions. Neurologic:  Neurovascularly intact without any focal sensory/motor deficits. Cranial nerves: II-XII intact, grossly non-focal.  Psychiatric:  Alert and oriented, thought content appropriate, normal insight  Capillary Refill: Brisk,< 3 seconds   Peripheral Pulses: +2 palpable, equal bilaterally       Labs:     Recent Labs      09/07/17   1205   WBC  10.4   HGB  12.1*   HCT  37.0*   PLT  393     Recent Labs      09/07/17   1205   NA  138   K  4.5   CL  98   CO2  30   BUN  51*   CREATININE  1.9*     Recent Labs      09/07/17   1205   AST  28   ALT  48   BILITOT  0.5   ALKPHOS  97     No results for input(s): INR in the last 72 hours.   Recent Labs      09/07/17   1205   TROPONINI  0.07       Urinalysis:      Lab Results   Component Value Date    NITRU NEGATIVE 07/17/2017    WBCUA 0-2 07/17/2017    BACTERIA NONE 07/17/2017    RBCUA NONE SEEN 07/17/2017    BLOODU NEGATIVE 07/17/2017    SPECGRAV 1.024 07/17/2017       Radiology:     CXR: I have reviewed the CXR with the following interpretation: none  EKG:  I have reviewed the EKG

## 2017-09-07 NOTE — CARE COORDINATION
9/7/17, 2:41 PM      Mariajose Arias       Admitted from: [facility] 9/7/2017/ [address] [facility] day: 0   Location: [facility]08/008- Reason for admit: Other chest pain [R07.89]  Chest pain [R07.9] Status: IP  Admit order signed?: yes  PMH:  has a past medical history of Gonsalves's esophagus; CAD (coronary artery disease); COPD (chronic obstructive pulmonary disease) ([facility] [address] Hyperlipidemia; Hypertension; Lung nodule; Osteoarthritis; Sleep apnea; Tobacco abuse; and Type II or unspecified type diabetes mellitus without mention of complication, not stated as uncontrolled. Procedure: none  Pertinent abnormal Imaging:none  Medications:  Scheduled Meds:   metoprolol tartrate  25 mg Oral Once    morphine         Continuous Infusions:   sodium chloride 100 mL/hr at 09/07/17 1220      Pertinent Info/Orders/Treatment Plan:  IVF/Oxygen 3L continued. Elevated Lipase/Renal Labs; monitor  DVT Prophylaxis: just admitted; await admit orders  Smoking status:  reports that he quit smoking about 17 years ago. His smoking use included Cigarettes. He has a 20.00 pack-year smoking history.  He has never used smokeless tobacco.   Influenza Vaccination Screening Completed: yes  Pneumonia Vaccination Screening Completed: no, just admitted, await nsg screen  Core measures: monitor  PCP: Piyush Washington MD  Readmission:   none  Risk Score: 26.5    Discharge Plan: met with client; denied needs as plans home with spouse, has CPAP, nebulizers; [facility] in past     Evaluation: no

## 2017-09-07 NOTE — ED PROVIDER NOTES
for dizziness, loss of consciousness and headaches. All other systems reviewed and are negative. PAST MEDICAL HISTORY     has a past medical history of Gonsalves's esophagus; CAD (coronary artery disease); COPD (chronic obstructive pulmonary disease) ([facility] [address] Hyperlipidemia; Hypertension; Lung nodule; Osteoarthritis; Sleep apnea; Tobacco abuse; and Type II or unspecified type diabetes mellitus without mention of complication, not stated as uncontrolled. SURGICAL HISTORY     has a past surgical history that includes Coronary angioplasty with stent; shoulder surgery; Carpal tunnel release; Elbow surgery; Rotator cuff repair; knee surgery (Left); Inguinal hernia repair (Bilateral); Cataract removal (Bilateral, 2012, 2016); Colonoscopy (2006,2010,2016); Upper gastrointestinal endoscopy (2006,2007,2010,2012, 2016); and egd transoral biopsy single/multiple (Left, 7/20/2017). CURRENT MEDICATIONS    Previous Medications    ACETAMINOPHEN (TYLENOL) 325 MG TABLET    Take 650 mg by mouth every 6 hours as needed for Pain    ALBUTEROL (PROVENTIL) (2.5 MG/3ML) 0.083% NEBULIZER SOLUTION    Take 3 mLs by nebulization 4 times daily    ALBUTEROL SULFATE  (90 BASE) MCG/ACT INHALER    Inhale 2 puffs into the lungs every 6 hours as needed for Wheezing    ASPIRIN 81 MG EC TABLET    Take 81 mg by mouth daily. BUMETANIDE (BUMEX) 1 MG TABLET    Take 2 mg by mouth daily    CARVEDILOL (COREG) 12.5 MG TABLET    Take 12.5 mg by mouth 2 times daily (with meals)    CPAP MACHINE MISC    by Does not apply route Please change CPAP pressure to 13 cm H20. EXENATIDE (BYDUREON) 2 MG PEN    Inject 2 mg into the skin once a week Takes on Sunday     FUROSEMIDE (LASIX) 40 MG TABLET    Take 40 mg by mouth daily     GLUCOSE MONITORING KIT (FREESTYLE) MONITORING KIT    Need one glucometer and 100 testing strips for 3-4 times per day testing.   Diagnosis is diabetes type 2    HYDRALAZINE (APRESOLINE) 10 MG TABLET    Take 10 mg by mouth 3 times daily. HYDROCHLOROTHIAZIDE (HYDRODIURIL) 25 MG TABLET    Take 25 mg by mouth daily    INSULIN GLARGINE (LANTUS) 100 UNIT/ML INJECTION VIAL    Inject 25 Units into the skin daily    INSULIN LISPRO (HUMALOG) 100 UNIT/ML INJECTION VIAL    Inject 5-17 Units into the skin 3 times daily (with meals) Glucose: Dose: If <139             5 units if eating  140-199 7 Units   200-249 9 Units   250-299 11 Units   300-349 13 Units   350-400 15 Units   Above 400       17 Units    INSULIN SYRINGE-NEEDLE U-100 (INSULIN SYRINGE .3CC/31GX5/16\") 31G X 5/16\" 0.3 ML MISC    1 each by Does not apply route daily    LANCETS MISC    1 each by Does not apply route daily    LEVOTHYROXINE (SYNTHROID) 25 MCG TABLET    Take 25 mcg by mouth daily    LOSARTAN (COZAAR) 100 MG TABLET    Take 1 tablet by mouth daily    METOPROLOL SUCCINATE (TOPROL XL) 50 MG EXTENDED RELEASE TABLET    Take 50 mg by mouth daily    MULTIPLE VITAMIN (MULTI VITAMIN MENS PO)    Take by mouth daily    OMEPRAZOLE (PRILOSEC) 20 MG CAPSULE    Take 1 capsule by mouth every morning (before breakfast)    PRAVASTATIN (PRAVACHOL) 80 MG TABLET    Take 1 tablet by mouth daily    SPIRIVA HANDIHALER 18 MCG INHALATION CAPSULE    INHALE 1 CAPSULE INTO THE LUNGS DAILY    SPIRONOLACTONE (ALDACTONE) 25 MG TABLET    Take 25 mg by mouth daily       ALLERGIES    is allergic to amlodipine. FAMILY HISTORY    indicated that the status of his father is unknown. He indicated that the status of his brother is unknown. He indicated that the status of his neg hx is unknown.  family history includes Cancer (age of onset: 52) in his brother; Heart Disease in his father. There is no history of Colon Cancer or Inflam Bowel Dis. SOCIAL HISTORY     reports that he quit smoking about 17 years ago. His smoking use included Cigarettes. He has a 20.00 pack-year smoking history. He has never used smokeless tobacco. He reports that he does not drink alcohol or use illicit drugs.     PHYSICAL EXAM INITIAL VITALS: /73  Pulse 90  Temp 99.1 °F (37.3 °C) (Tympanic)   Resp 16  Ht 5' 9\" (1.753 m)  Wt 174 lb 2.6 oz (79 kg)  SpO2 95%  BMI 25.72 kg/m2     Physical Exam   Constitutional: He is oriented to person, place, and time. He appears well-developed and well-nourished. He appears distressed. HENT:   Mouth/Throat: Oropharynx is clear and moist.   Eyes: Pupils are equal, round, and reactive to light. Neck: Neck supple. Cardiovascular: Normal rate and regular rhythm. No murmur heard. Pulmonary/Chest: Effort normal and breath sounds normal. No respiratory distress. He has no wheezes. He exhibits no tenderness. Abdominal: Soft. Bowel sounds are normal. There is tenderness (Epigastric, no rebound, guarding, mass. ). Musculoskeletal: He exhibits no edema or tenderness. Lymphadenopathy:     He has no cervical adenopathy. Neurological: He is alert and oriented to person, place, and time. He exhibits normal muscle tone. Coordination normal.   Skin: Skin is warm and dry. No rash noted. He is not diaphoretic. No erythema. Psychiatric:   Pain behavior   Nursing note and vitals reviewed. DIAGNOSTIC RESULTS    EKG       Normal sinus rhythm, sinus arrhythmia, diffuse nonspecific ST-T wave changes, age-indeterminate inferior MI, age indeterminate septal MI, no acute pattern of infarct. Nonspecific changes are worse since July 2017. RADIOLOGY:         XR Chest Portable (Final result) Result time: 09/07/17 12:43:13     Final result by Ishan Jimenez. Melony Cardenas MD (09/07/17 12:43:13)     Impression:       No acute findings, stable from the previous study of 7/18/2017. **This report has been created using voice recognition software.  It may contain minor errors which are inherent in voice recognition technology. **    Final report electronically signed by Dr. Selena Key on 9/7/2017 12:43 PM       Narrative:       PROCEDURE: XR CHEST PORTABLE    CLINICAL INFORMATION: chest pain, for pain. After lab results, he received IV insulin, 5 units. arrangements are made for admission to telemetry. CRITICAL CARE:     30 minutes      FINAL IMPRESSION      1. Other chest pain        DISPOSITION/PLAN    DISPOSITION Decision to Admit to [facility], Dr. Frances Montes, telemetry, ambulance transport.               (Please note that portions of this note were completed with a voice recognition program.  Efforts were made to edit the dictations but occasionally words are mis-transcribed.)     Chen Polanco MD  09/07/17 0441

## 2017-09-08 LAB
ANION GAP SERPL CALCULATED.3IONS-SCNC: 5 MEQ/L (ref 8–16)
APTT: 61.3 SECONDS (ref 22–38)
APTT: 68 SECONDS (ref 22–38)
APTT: 70.2 SECONDS (ref 22–38)
BUN BLDV-MCNC: 46 MG/DL (ref 7–22)
CALCIUM SERPL-MCNC: 8.5 MG/DL (ref 8.5–10.5)
CHLORIDE BLD-SCNC: 106 MEQ/L (ref 98–111)
CHOLESTEROL, TOTAL: 137 MG/DL (ref 100–199)
CO2: 29 MEQ/L (ref 23–33)
CREAT SERPL-MCNC: 1.8 MG/DL (ref 0.4–1.2)
EKG ATRIAL RATE: 70 BPM
EKG ATRIAL RATE: 94 BPM
EKG P AXIS: 67 DEGREES
EKG P AXIS: 68 DEGREES
EKG P-R INTERVAL: 168 MS
EKG P-R INTERVAL: 168 MS
EKG Q-T INTERVAL: 358 MS
EKG Q-T INTERVAL: 434 MS
EKG QRS DURATION: 80 MS
EKG QRS DURATION: 84 MS
EKG QTC CALCULATION (BAZETT): 447 MS
EKG QTC CALCULATION (BAZETT): 468 MS
EKG R AXIS: 26 DEGREES
EKG R AXIS: 38 DEGREES
EKG T AXIS: 77 DEGREES
EKG T AXIS: 83 DEGREES
EKG VENTRICULAR RATE: 70 BPM
EKG VENTRICULAR RATE: 94 BPM
GFR SERPL CREATININE-BSD FRML MDRD: 38 ML/MIN/1.73M2
GLUCOSE BLD-MCNC: 144 MG/DL (ref 70–108)
GLUCOSE BLD-MCNC: 149 MG/DL (ref 70–108)
GLUCOSE BLD-MCNC: 214 MG/DL (ref 70–108)
GLUCOSE BLD-MCNC: 23 MG/DL (ref 70–108)
GLUCOSE BLD-MCNC: 305 MG/DL (ref 70–108)
GLUCOSE BLD-MCNC: 307 MG/DL (ref 70–108)
GLUCOSE BLD-MCNC: 308 MG/DL (ref 70–108)
GLUCOSE BLD-MCNC: 33 MG/DL (ref 70–108)
GLUCOSE BLD-MCNC: 94 MG/DL (ref 70–108)
HCT VFR BLD CALC: 31.8 % (ref 42–52)
HDLC SERPL-MCNC: 69 MG/DL
HEMOGLOBIN: 10.9 GM/DL (ref 14–18)
LDL CHOLESTEROL CALCULATED: 59 MG/DL
LV EF: 38 %
LVEF MODALITY: NORMAL
MCH RBC QN AUTO: 29.4 PG (ref 27–31)
MCHC RBC AUTO-ENTMCNC: 34.2 GM/DL (ref 33–37)
MCV RBC AUTO: 85.8 FL (ref 80–94)
PDW BLD-RTO: 14.1 % (ref 11.5–14.5)
PLATELET # BLD: 357 THOU/MM3 (ref 130–400)
PMV BLD AUTO: 7.4 MCM (ref 7.4–10.4)
POTASSIUM SERPL-SCNC: 3.9 MEQ/L (ref 3.5–5.2)
RBC # BLD: 3.7 MILL/MM3 (ref 4.7–6.1)
SODIUM BLD-SCNC: 140 MEQ/L (ref 135–145)
TRIGL SERPL-MCNC: 47 MG/DL (ref 0–199)
TROPONIN T: < 0.01 NG/ML
WBC # BLD: 9.7 THOU/MM3 (ref 4.8–10.8)

## 2017-09-08 PROCEDURE — 93005 ELECTROCARDIOGRAM TRACING: CPT

## 2017-09-08 PROCEDURE — 99233 SBSQ HOSP IP/OBS HIGH 50: CPT | Performed by: FAMILY MEDICINE

## 2017-09-08 PROCEDURE — 6370000000 HC RX 637 (ALT 250 FOR IP): Performed by: INTERNAL MEDICINE

## 2017-09-08 PROCEDURE — 36415 COLL VENOUS BLD VENIPUNCTURE: CPT

## 2017-09-08 PROCEDURE — 85730 THROMBOPLASTIN TIME PARTIAL: CPT

## 2017-09-08 PROCEDURE — 2580000003 HC RX 258: Performed by: EMERGENCY MEDICINE

## 2017-09-08 PROCEDURE — 2580000003 HC RX 258: Performed by: INTERNAL MEDICINE

## 2017-09-08 PROCEDURE — 80061 LIPID PANEL: CPT

## 2017-09-08 PROCEDURE — 1200000003 HC TELEMETRY R&B

## 2017-09-08 PROCEDURE — 2500000003 HC RX 250 WO HCPCS: Performed by: INTERNAL MEDICINE

## 2017-09-08 PROCEDURE — 94640 AIRWAY INHALATION TREATMENT: CPT

## 2017-09-08 PROCEDURE — 85027 COMPLETE CBC AUTOMATED: CPT

## 2017-09-08 PROCEDURE — 80048 BASIC METABOLIC PNL TOTAL CA: CPT

## 2017-09-08 PROCEDURE — 6370000000 HC RX 637 (ALT 250 FOR IP): Performed by: NURSE PRACTITIONER

## 2017-09-08 PROCEDURE — 6360000002 HC RX W HCPCS: Performed by: FAMILY MEDICINE

## 2017-09-08 PROCEDURE — 2700000000 HC OXYGEN THERAPY PER DAY

## 2017-09-08 PROCEDURE — 82948 REAGENT STRIP/BLOOD GLUCOSE: CPT

## 2017-09-08 PROCEDURE — 2580000003 HC RX 258: Performed by: NURSE PRACTITIONER

## 2017-09-08 PROCEDURE — G0009 ADMIN PNEUMOCOCCAL VACCINE: HCPCS | Performed by: INTERNAL MEDICINE

## 2017-09-08 PROCEDURE — 93306 TTE W/DOPPLER COMPLETE: CPT

## 2017-09-08 PROCEDURE — 6360000002 HC RX W HCPCS: Performed by: INTERNAL MEDICINE

## 2017-09-08 PROCEDURE — 84484 ASSAY OF TROPONIN QUANT: CPT

## 2017-09-08 PROCEDURE — 90670 PCV13 VACCINE IM: CPT | Performed by: INTERNAL MEDICINE

## 2017-09-08 PROCEDURE — 6360000002 HC RX W HCPCS: Performed by: NURSE PRACTITIONER

## 2017-09-08 RX ORDER — SUB-Q INSULIN DEVICE, 40 UNIT
EACH MISCELLANEOUS
COMMUNITY

## 2017-09-08 RX ORDER — HEPARIN SODIUM 1000 [USP'U]/ML
2000 INJECTION, SOLUTION INTRAVENOUS; SUBCUTANEOUS PRN
Status: DISCONTINUED | OUTPATIENT
Start: 2017-09-08 | End: 2017-09-11

## 2017-09-08 RX ORDER — DEXTROSE MONOHYDRATE 25 G/50ML
25 INJECTION, SOLUTION INTRAVENOUS PRN
Status: DISCONTINUED | OUTPATIENT
Start: 2017-09-08 | End: 2017-09-17 | Stop reason: HOSPADM

## 2017-09-08 RX ORDER — ASCORBIC ACID 500 MG
500 TABLET ORAL DAILY
COMMUNITY

## 2017-09-08 RX ORDER — INSULIN GLARGINE 100 [IU]/ML
10 INJECTION, SOLUTION SUBCUTANEOUS ONCE
Status: COMPLETED | OUTPATIENT
Start: 2017-09-08 | End: 2017-09-08

## 2017-09-08 RX ORDER — LOSARTAN POTASSIUM 50 MG/1
50 TABLET ORAL DAILY
Status: ON HOLD | COMMUNITY
End: 2017-09-16 | Stop reason: HOSPADM

## 2017-09-08 RX ORDER — METHYLPREDNISOLONE SODIUM SUCCINATE 125 MG/2ML
125 INJECTION, POWDER, LYOPHILIZED, FOR SOLUTION INTRAMUSCULAR; INTRAVENOUS ONCE
Status: COMPLETED | OUTPATIENT
Start: 2017-09-08 | End: 2017-09-08

## 2017-09-08 RX ORDER — ACETYLCYSTEINE 200 MG/ML
600 SOLUTION ORAL; RESPIRATORY (INHALATION) 2 TIMES DAILY
Status: DISCONTINUED | OUTPATIENT
Start: 2017-09-08 | End: 2017-09-11

## 2017-09-08 RX ORDER — ZINC GLUCONATE 50 MG
50 TABLET ORAL DAILY
COMMUNITY
End: 2018-10-23 | Stop reason: ALTCHOICE

## 2017-09-08 RX ORDER — HEPARIN SODIUM 1000 [USP'U]/ML
4000 INJECTION, SOLUTION INTRAVENOUS; SUBCUTANEOUS PRN
Status: DISCONTINUED | OUTPATIENT
Start: 2017-09-08 | End: 2017-09-11

## 2017-09-08 RX ADMIN — HYDRALAZINE HYDROCHLORIDE 10 MG: 10 TABLET, FILM COATED ORAL at 20:58

## 2017-09-08 RX ADMIN — PNEUMOCOCCAL 13-VALENT CONJUGATE VACCINE 0.5 ML: 2.2; 2.2; 2.2; 2.2; 2.2; 4.4; 2.2; 2.2; 2.2; 2.2; 2.2; 2.2; 2.2 INJECTION, SUSPENSION INTRAMUSCULAR at 17:31

## 2017-09-08 RX ADMIN — Medication 5 UNITS: at 17:18

## 2017-09-08 RX ADMIN — Medication 9 UNITS: at 12:09

## 2017-09-08 RX ADMIN — PANTOPRAZOLE SODIUM 40 MG: 40 TABLET, DELAYED RELEASE ORAL at 05:02

## 2017-09-08 RX ADMIN — Medication 2 PUFF: at 16:43

## 2017-09-08 RX ADMIN — BUMETANIDE 2 MG: 1 TABLET ORAL at 08:31

## 2017-09-08 RX ADMIN — SODIUM CHLORIDE: 9 INJECTION, SOLUTION INTRAVENOUS at 21:14

## 2017-09-08 RX ADMIN — Medication 2 PUFF: at 20:47

## 2017-09-08 RX ADMIN — Medication 2 PUFF: at 16:44

## 2017-09-08 RX ADMIN — SODIUM CHLORIDE: 9 INJECTION, SOLUTION INTRAVENOUS at 00:48

## 2017-09-08 RX ADMIN — Medication 10 ML: at 21:01

## 2017-09-08 RX ADMIN — HYDRALAZINE HYDROCHLORIDE 10 MG: 10 TABLET, FILM COATED ORAL at 15:12

## 2017-09-08 RX ADMIN — SPIRONOLACTONE 25 MG: 25 TABLET, FILM COATED ORAL at 08:31

## 2017-09-08 RX ADMIN — MORPHINE SULFATE 2 MG: 2 INJECTION, SOLUTION INTRAMUSCULAR; INTRAVENOUS at 00:04

## 2017-09-08 RX ADMIN — Medication 2 PUFF: at 12:49

## 2017-09-08 RX ADMIN — LOSARTAN POTASSIUM 50 MG: 50 TABLET, FILM COATED ORAL at 08:31

## 2017-09-08 RX ADMIN — METHYLPREDNISOLONE SODIUM SUCCINATE 125 MG: 125 INJECTION, POWDER, FOR SOLUTION INTRAMUSCULAR; INTRAVENOUS at 12:10

## 2017-09-08 RX ADMIN — Medication 2 PUFF: at 09:16

## 2017-09-08 RX ADMIN — NITROGLYCERIN 5 MCG/MIN: 20 INJECTION INTRAVENOUS at 10:03

## 2017-09-08 RX ADMIN — CARVEDILOL 12.5 MG: 6.25 TABLET, FILM COATED ORAL at 07:32

## 2017-09-08 RX ADMIN — ACETYLCYSTEINE 600 MG: 200 SOLUTION ORAL; RESPIRATORY (INHALATION) at 17:24

## 2017-09-08 RX ADMIN — PRAVASTATIN SODIUM 80 MG: 80 TABLET ORAL at 20:58

## 2017-09-08 RX ADMIN — INSULIN GLARGINE 10 UNITS: 100 INJECTION, SOLUTION SUBCUTANEOUS at 08:30

## 2017-09-08 RX ADMIN — LEVOTHYROXINE SODIUM 25 MCG: 25 TABLET ORAL at 05:02

## 2017-09-08 RX ADMIN — HYDROCHLOROTHIAZIDE 25 MG: 25 TABLET ORAL at 08:31

## 2017-09-08 RX ADMIN — HYDRALAZINE HYDROCHLORIDE 10 MG: 10 TABLET, FILM COATED ORAL at 08:31

## 2017-09-08 RX ADMIN — CARVEDILOL 12.5 MG: 6.25 TABLET, FILM COATED ORAL at 17:22

## 2017-09-08 RX ADMIN — SODIUM CHLORIDE: 9 INJECTION, SOLUTION INTRAVENOUS at 10:51

## 2017-09-08 RX ADMIN — DEXTROSE MONOHYDRATE 25 G: 25 INJECTION, SOLUTION INTRAVENOUS at 04:36

## 2017-09-08 RX ADMIN — Medication 2 PUFF: at 20:50

## 2017-09-08 RX ADMIN — Medication 13 UNITS: at 08:31

## 2017-09-08 RX ADMIN — HEPARIN SODIUM AND DEXTROSE 12.03 UNITS/KG/HR: 10000; 5 INJECTION INTRAVENOUS at 21:13

## 2017-09-08 RX ADMIN — ACETYLCYSTEINE 600 MG: 200 SOLUTION ORAL; RESPIRATORY (INHALATION) at 20:59

## 2017-09-08 RX ADMIN — ASPIRIN 81 MG: 81 TABLET, CHEWABLE ORAL at 08:31

## 2017-09-08 RX ADMIN — INSULIN LISPRO 5 UNITS: 100 INJECTION, SOLUTION INTRAVENOUS; SUBCUTANEOUS at 07:12

## 2017-09-08 ASSESSMENT — PAIN SCALES - GENERAL
PAINLEVEL_OUTOF10: 0
PAINLEVEL_OUTOF10: 5
PAINLEVEL_OUTOF10: 0
PAINLEVEL_OUTOF10: 0
PAINLEVEL_OUTOF10: 7
PAINLEVEL_OUTOF10: 0

## 2017-09-08 ASSESSMENT — PAIN DESCRIPTION - ORIENTATION: ORIENTATION: MID

## 2017-09-08 ASSESSMENT — PAIN DESCRIPTION - ONSET: ONSET: ON-GOING

## 2017-09-08 ASSESSMENT — PAIN DESCRIPTION - DESCRIPTORS: DESCRIPTORS: PRESSURE

## 2017-09-08 ASSESSMENT — PAIN DESCRIPTION - LOCATION: LOCATION: CHEST

## 2017-09-08 ASSESSMENT — PAIN DESCRIPTION - PAIN TYPE: TYPE: ACUTE PAIN

## 2017-09-08 ASSESSMENT — PAIN DESCRIPTION - FREQUENCY: FREQUENCY: CONTINUOUS

## 2017-09-08 ASSESSMENT — PAIN DESCRIPTION - PROGRESSION: CLINICAL_PROGRESSION: GRADUALLY IMPROVING

## 2017-09-08 NOTE — PROGRESS NOTES
Pt. sitting in bed high fowlers. A&O x4. Pupils equal round and reactive to light. Language clear and appropriate. Appropriate judgement/concentration. Respirations regular and unlabored on 2L nasal cannula. Lung sounds diminished bilaterally. Dry non productive cough noted. Apical pulse 84, regular rate and rhythm. Telemetry monitor in place. Abdomen soft, round, and non tender. Bowel sounds active all four quadrants. Full sensation present in all extremities. Capillary refill less than 3 seconds. Hand grasp strong and equal bilat. Skin warm and dry, color appropriate for ethnicity. Pedal push and pull strong and equal bilat. No edema noted. No numbness or tingling.

## 2017-09-08 NOTE — PROGRESS NOTES
Hospitalist Progress Note      Patient:  Savannah Doyle      Unit/Bed:4K-08/008-A    YOB: 1950    MRN: [id number]       Acct: [de-identified]     PCP: Tati Dietrich MD    Date of Admission: 9/7/2017    Assessment/Plan:  1. Chest pain -- likely MSK and likely costochondritis as very TTP, trops (-) x 3 -- d/w Dr Austin Naik with pt's hx would like to do heart cath but creat up to 1.8 9/8 --> thus Dr Austin Naik would like to keep ntg gtt, heparin gtt   -- Dr. Cassandra Newman would like dose of steroids for MSK/pleuritic CP --> given solumedrol 125 x 1 -- try to avoid further steroids given #2 -- CXR 9/7 (-)  -- echo 9/8/17 (p)  2. Uncontrolled type 2 DM -- +hypoglycemia am 9/8 --> V-GO pump stopped and on lantus - cont lantus and SSI for now - will go up with steroids given with #1 -- last A1C 7/19/17 = 11.4 -- ?c/s endo -- holding exenatide  3. HARDY on CKD -- up to 1.9 on admission and was 1.0 on 7/22/17 (but was up to 3.3 on 7/17) -- hold bumex, HCTZ, aldactone - IVF and monitor fluid status closely with hx CHF  -- mucomyst per Dr. Austin Naik for plans for [address] [place] [address]   -- ?renal c/s  4. CAD -- last cath 9/2016 per Dr. Austin Naik with EF 20%, patent RCA, 40% ostial prox LM, Codominant circumflex artery with the stented area of the obtuse marginal, Severe stenosis at the ostium and proximal portion of the high first diagonal artery was about 1 mm in diameter, diffuse nonobstructive disease, proximal LAD about 50% eccentric narrowing of the mid LAD. Distally, the LAD was patent still patent with about 60% ostial narrowing  -- with #1 per d/w Dr. Austin Naik would like to do LHC   -- cont ntg gtt, heparin gtt, coreg, cozaar, ASA, statin  5. Elevated lipase -- up to 309 on 9/7 -- ?significant and ?contrib to CP - rest of LFT WNL 9/7  6.  Chronic systolic and diastolic CHF/non-ischemic CM -- last echo 9/2016 with EF 85-68%, grade 1 diastolic dysfxn, mod MR -- fluid status stable   -- hold bumex, aldactone, HCTZ with HARDY -- cont coreg, cozaar  -- echo 9/8/17 (p)  7. Essential HTN -- cont coreg, cozaar, hydralazine -- ntg gtt for #1 - - monitor and adjust prn  8. Normocytic anemia -- chronic component with baseline ~10-12's -- monitor on heparin gtt  9. HLP -- lipids 9/8/17 acceptable -- statin  10. Hypothyroidism -- cont synthroid -- last TSH slightly elevated 8/24 - cont monitor  11. ALANA -- cont home cpap    Dispo  --9/8 --> d/w Dr. Yfn Lopez - would like to do LHC but creat up to 1.8 --> requesting hold diuretics, cont IVF, mucomyst and monitor creat - also requested dose of steroids for pleuritic pain - solumedrol x 1 given and will monitor BS closely as very labile with low BS this am and V-go pump off -- cont monitor lytes, renal fxn, BP, BS.        -----------------------------------------------------------------------------------------------    Chief Complaint: chest pain    Hospital Course: PT with hx CAD and cardiomyopathy with ER 20%, type 2 DM on V-go insulin pump admitted with midsternal cp with radiation to left shoulder blade and epigastrum. Started on heparin and ntg gtt's. Had Rapid response called this am for hypoglycemia down to 23 -- pt with V-GO and device stopped. CArdiology c/s completed 9/8 by Dr. Yfn Lopez who recommends cath but with HARDY creat 1.8 am 9/8 thus IVF, stopped diuretics and re-eval tomorrow. He also requested dose of steroids 9/8 for pleuritic chest pain and ?pericarditis. Echo (p). Subjective:   -- 9/8 --> pt doing better - still with some mid sternal cp but only worsens with coughing. No production with cough. No n/v/f/c. Abdelrahman po. No CP with exertion. No lightheaded or dizziness.   BS improved      Medications:  Reviewed    Infusion Medications    sodium chloride 100 mL/hr at 09/08/17 0048    dextrose      heparin (porcine) 12 Units/kg/hr (09/07/17 2121)    nitroGLYCERIN Stopped (09/07/17 2128)     Scheduled Medications    metoprolol tartrate  25 mg Oral Once    bumetanide  2 mg Oral Daily    carvedilol  12.5 mg Oral BID WC    hydrALAZINE  10 mg Oral TID    hydrochlorothiazide  25 mg Oral Daily    insulin glargine  25 Units Subcutaneous Daily    insulin lispro  5-17 Units Subcutaneous TID WC    levothyroxine  25 mcg Oral Daily    losartan  50 mg Oral Daily    pantoprazole  40 mg Oral QAM AC    pravastatin  80 mg Oral Daily    spironolactone  25 mg Oral Daily    sodium chloride flush  10 mL Intravenous 2 times per day    albuterol sulfate HFA  2 puff Inhalation 4x daily    And    ipratropium  2 puff Inhalation 4x daily    aspirin  81 mg Oral Daily    pneumococcal 13-valent conjugate  0.5 mL Intramuscular Once     PRN Meds: dextrose, albuterol sulfate HFA, sodium chloride flush, acetaminophen, magnesium hydroxide, ondansetron, potassium chloride **OR** potassium chloride **OR** potassium chloride, magnesium sulfate, nitroGLYCERIN, glucose, glucagon (rDNA), dextrose      Intake/Output Summary (Last 24 hours) at 09/08/17 0954  Last data filed at 09/08/17 4931   Gross per 24 hour   Intake          2450.02 ml   Output             1000 ml   Net          1450.02 ml       Diet:  DIET LOW SODIUM 2 GM; Exam:  BP (!) 141/67  Pulse 88  Temp 98 °F (36.7 °C) (Oral)   Resp 16  Ht 5' 9\" (1.753 m)  Wt 182 lb 14.4 oz (83 kg)  SpO2 96%  BMI 27.01 kg/m2 RA    General appearance: No apparent distress, appears older than stated age and cooperative. HEENT: Pupils equal, round, and reactive to light. Conjunctivae/corneas clear. Neck: Supple, with full range of motion. No jugular venous distention. Trachea midline. Respiratory:  Normal respiratory effort. Faint exp wheeze, no rales or rhonchi. No respiratory distress or accessory muscle use. Cardiovascular: Regular rate and rhythm with ectopy with normal S1/S2 without murmurs, rubs or gallops. Chest wall ++TTP mid lower sternum and left costochondral areas  Abdomen: Soft, non-tender, non-distended with normal bowel sounds.   No rebound or heparin gtt     Disposition:    [x] Home       [] TCU       [] Rehab       [] Psych       [] SNF       [] [place]       [] Other-    Code Status: Full Code    PT/OT Eval Status: monitor for need          Electronically signed by KENDRICK RANGEL MD on 9/8/2017 at 9:37 AM when pt evaluated - final note filed late 9/9/2017

## 2017-09-08 NOTE — FLOWSHEET NOTE
09/08/17 6852   Provider Notification   Reason for Communication New orders   Provider Name THE [facility]   Provider Notification Nurse Practitioner   Method of Communication Call   Response See orders   Notification Time 0654   Called back and ordered 10 units of lantus now instead of the full 25 units of Lantus. Ensure that patient orders and eats breakfast. Day shift hospitalist to manage Lantus.

## 2017-09-08 NOTE — FLOWSHEET NOTE
09/08/17 8711   Provider Notification   Reason for Communication Review case   Provider Name THE [facility]   Provider Notification Nurse Practitioner   Method of Communication Secure Message   Response Waiting for response   Notification Time 6662   Notified of blood glucose result of 307.    0648- Ordered to give the daily dose of 25 units of Lantus now. Give a one time dose of 5 units of humalog. Check blood glucose before breakfast and cover per sliding scale.

## 2017-09-08 NOTE — FLOWSHEET NOTE
Initial Spiritual Care Contact:     Lots of laughter in the room. Patient introduced me to his two sisters and his wife. He is [employer] and stated that he received communion this morning. He does not have a [employer] he belongs to at this time. 09/08/17 1421   Encounter Summary   Services provided to: Patient and family together   Referral/Consult From: [address] [place] [address] Drive; Family members   Place of [address] [place] [address] Visiting Yes  (9/8 as needed)   Complexity of Encounter Moderate   Length of Encounter 15 minutes   Routine   Type Initial   Assessment Approachable   Intervention Prayer;Nurtured hope; Active listening   Outcome Coping   Spiritual/Druze   Type Spiritual support   Spiritual care card with Psa 23, prayer or Prayer for peace was given. It has our information of service, hours and phone number on it.

## 2017-09-08 NOTE — PROGRESS NOTES
Pharmacy Medication History Note      List of current medications patient is taking is complete. Source of information: RxNT, patient    Changes made to medication list:  Medications removed (include reason, ex. therapy complete or physician discontinued): · Lasix-pt taking bumex  · Metoprolol- pt taking carvedilol   · HCTZ- this medication was stopped-no longer taking  · Lantus- no longer takes  · Humalog SSI- uses the VGO humalog instead  · Albuterol nebules- does not have nebulizer- only uses albuterol inhaler    Medications added/doses adjusted:  · Added VGO 30- Each click of device= 2 units of humalog (Receives 1-2 clicks in am, 6 clicks with lunch, 6 clicks with evening meal, and 1-2 clicks with snacks)  · Adjusted losartan to 50 mg daily  · Adjusted bumex to 1 mg BID  · Adjusted dose of MV  · Added Vitamin C daily  · Added cinnamon 2 tablets daily  · Added zinc daily  · Added B plus vitamin daily    Other notes (ex. Recent course of antibiotics, Coumadin dosing):  · Denies use of other OTC or herbal medications. Know he takes a couple other supplements but unsure of names.        Allergies reviewed      Electronically signed by Zoraida Shearer, [facility] on 9/8/2017 at 10:33 AM

## 2017-09-08 NOTE — FLOWSHEET NOTE
09/07/17 2021   Provider Notification   Reason for Communication Evaluate   Provider Name THE [facility]   Provider Notification Nurse Practitioner   Method of Communication Secure Message   Notification Time 2021   MD notified of heparin drip ordered with no bolus. Asked if bolus is wanted. Waiting for response.

## 2017-09-09 ENCOUNTER — APPOINTMENT (OUTPATIENT)
Dept: CARDIAC CATH/INVASIVE PROCEDURES | Age: 67
DRG: 208 | End: 2017-09-09
Payer: MEDICARE

## 2017-09-09 LAB
ANION GAP SERPL CALCULATED.3IONS-SCNC: 17 MEQ/L (ref 8–16)
ANION GAP SERPL CALCULATED.3IONS-SCNC: 18 MEQ/L (ref 8–16)
APTT: 44.7 SECONDS (ref 22–38)
APTT: 66.2 SECONDS (ref 22–38)
APTT: 88.8 SECONDS (ref 22–38)
BUN BLDV-MCNC: 44 MG/DL (ref 7–22)
BUN BLDV-MCNC: 45 MG/DL (ref 7–22)
CALCIUM SERPL-MCNC: 8.7 MG/DL (ref 8.5–10.5)
CALCIUM SERPL-MCNC: 8.7 MG/DL (ref 8.5–10.5)
CHLORIDE BLD-SCNC: 97 MEQ/L (ref 98–111)
CHLORIDE BLD-SCNC: 98 MEQ/L (ref 98–111)
CO2: 23 MEQ/L (ref 23–33)
CO2: 23 MEQ/L (ref 23–33)
CREAT SERPL-MCNC: 1.4 MG/DL (ref 0.4–1.2)
CREAT SERPL-MCNC: 1.7 MG/DL (ref 0.4–1.2)
GFR SERPL CREATININE-BSD FRML MDRD: 40 ML/MIN/1.73M2
GFR SERPL CREATININE-BSD FRML MDRD: 50 ML/MIN/1.73M2
GLUCOSE BLD-MCNC: 247 MG/DL (ref 70–108)
GLUCOSE BLD-MCNC: 247 MG/DL (ref 70–108)
GLUCOSE BLD-MCNC: 297 MG/DL (ref 70–108)
GLUCOSE BLD-MCNC: 478 MG/DL (ref 70–108)
GLUCOSE BLD-MCNC: 537 MG/DL (ref 70–108)
GLUCOSE BLD-MCNC: 557 MG/DL (ref 70–108)
GLUCOSE BLD-MCNC: 572 MG/DL (ref 70–108)
GLUCOSE BLD-MCNC: > 600 MG/DL (ref 70–108)
HCT VFR BLD CALC: 32.4 % (ref 42–52)
HEMOGLOBIN: 10.6 GM/DL (ref 14–18)
MCH RBC QN AUTO: 28.7 PG (ref 27–31)
MCHC RBC AUTO-ENTMCNC: 32.7 GM/DL (ref 33–37)
MCV RBC AUTO: 87.8 FL (ref 80–94)
PDW BLD-RTO: 13.6 % (ref 11.5–14.5)
PLATELET # BLD: 340 THOU/MM3 (ref 130–400)
PMV BLD AUTO: 8 MCM (ref 7.4–10.4)
POTASSIUM SERPL-SCNC: 4.8 MEQ/L (ref 3.5–5.2)
POTASSIUM SERPL-SCNC: 5.4 MEQ/L (ref 3.5–5.2)
RBC # BLD: 3.69 MILL/MM3 (ref 4.7–6.1)
SODIUM BLD-SCNC: 138 MEQ/L (ref 135–145)
SODIUM BLD-SCNC: 138 MEQ/L (ref 135–145)
WBC # BLD: 12.2 THOU/MM3 (ref 4.8–10.8)

## 2017-09-09 PROCEDURE — 94640 AIRWAY INHALATION TREATMENT: CPT

## 2017-09-09 PROCEDURE — 6370000000 HC RX 637 (ALT 250 FOR IP): Performed by: INTERNAL MEDICINE

## 2017-09-09 PROCEDURE — 6360000002 HC RX W HCPCS: Performed by: INTERNAL MEDICINE

## 2017-09-09 PROCEDURE — 82948 REAGENT STRIP/BLOOD GLUCOSE: CPT

## 2017-09-09 PROCEDURE — 99233 SBSQ HOSP IP/OBS HIGH 50: CPT | Performed by: FAMILY MEDICINE

## 2017-09-09 PROCEDURE — 1200000003 HC TELEMETRY R&B

## 2017-09-09 PROCEDURE — 80048 BASIC METABOLIC PNL TOTAL CA: CPT

## 2017-09-09 PROCEDURE — 2580000003 HC RX 258: Performed by: EMERGENCY MEDICINE

## 2017-09-09 PROCEDURE — 36415 COLL VENOUS BLD VENIPUNCTURE: CPT

## 2017-09-09 PROCEDURE — 85027 COMPLETE CBC AUTOMATED: CPT

## 2017-09-09 PROCEDURE — 85730 THROMBOPLASTIN TIME PARTIAL: CPT

## 2017-09-09 PROCEDURE — 99221 1ST HOSP IP/OBS SF/LOW 40: CPT | Performed by: INTERNAL MEDICINE

## 2017-09-09 PROCEDURE — 6360000002 HC RX W HCPCS: Performed by: FAMILY MEDICINE

## 2017-09-09 PROCEDURE — 6370000000 HC RX 637 (ALT 250 FOR IP): Performed by: FAMILY MEDICINE

## 2017-09-09 RX ORDER — INSULIN GLARGINE 100 [IU]/ML
25 INJECTION, SOLUTION SUBCUTANEOUS ONCE
Status: COMPLETED | OUTPATIENT
Start: 2017-09-09 | End: 2017-09-09

## 2017-09-09 RX ORDER — SODIUM POLYSTYRENE SULFONATE 15 G/60ML
30 SUSPENSION ORAL; RECTAL ONCE
Status: COMPLETED | OUTPATIENT
Start: 2017-09-09 | End: 2017-09-09

## 2017-09-09 RX ADMIN — Medication 9 UNITS: at 12:02

## 2017-09-09 RX ADMIN — HYDRALAZINE HYDROCHLORIDE 10 MG: 10 TABLET, FILM COATED ORAL at 08:11

## 2017-09-09 RX ADMIN — Medication 11 UNITS: at 17:30

## 2017-09-09 RX ADMIN — SODIUM CHLORIDE: 9 INJECTION, SOLUTION INTRAVENOUS at 18:13

## 2017-09-09 RX ADMIN — Medication 2 PUFF: at 20:30

## 2017-09-09 RX ADMIN — Medication 30 G: at 08:11

## 2017-09-09 RX ADMIN — INSULIN LISPRO 12 UNITS: 100 INJECTION, SOLUTION INTRAVENOUS; SUBCUTANEOUS at 05:23

## 2017-09-09 RX ADMIN — CARVEDILOL 12.5 MG: 6.25 TABLET, FILM COATED ORAL at 17:30

## 2017-09-09 RX ADMIN — ASPIRIN 81 MG: 81 TABLET, CHEWABLE ORAL at 08:11

## 2017-09-09 RX ADMIN — INSULIN GLARGINE 25 UNITS: 100 INJECTION, SOLUTION SUBCUTANEOUS at 08:16

## 2017-09-09 RX ADMIN — CARVEDILOL 12.5 MG: 6.25 TABLET, FILM COATED ORAL at 08:11

## 2017-09-09 RX ADMIN — ACETYLCYSTEINE 600 MG: 200 SOLUTION ORAL; RESPIRATORY (INHALATION) at 13:10

## 2017-09-09 RX ADMIN — Medication 2 PUFF: at 13:13

## 2017-09-09 RX ADMIN — HYDRALAZINE HYDROCHLORIDE 10 MG: 10 TABLET, FILM COATED ORAL at 13:10

## 2017-09-09 RX ADMIN — SODIUM CHLORIDE: 9 INJECTION, SOLUTION INTRAVENOUS at 07:57

## 2017-09-09 RX ADMIN — HEPARIN SODIUM 2000 UNITS: 1000 INJECTION, SOLUTION INTRAVENOUS; SUBCUTANEOUS at 07:34

## 2017-09-09 RX ADMIN — LEVOTHYROXINE SODIUM 25 MCG: 25 TABLET ORAL at 07:38

## 2017-09-09 RX ADMIN — HYDRALAZINE HYDROCHLORIDE 10 MG: 10 TABLET, FILM COATED ORAL at 20:54

## 2017-09-09 RX ADMIN — PANTOPRAZOLE SODIUM 40 MG: 40 TABLET, DELAYED RELEASE ORAL at 07:38

## 2017-09-09 RX ADMIN — HEPARIN SODIUM AND DEXTROSE 15 UNITS/KG/HR: 10000; 5 INJECTION INTRAVENOUS at 21:38

## 2017-09-09 RX ADMIN — LOSARTAN POTASSIUM 50 MG: 50 TABLET, FILM COATED ORAL at 08:10

## 2017-09-09 RX ADMIN — Medication 17 UNITS: at 08:11

## 2017-09-09 RX ADMIN — Medication 2 PUFF: at 08:35

## 2017-09-09 RX ADMIN — INSULIN GLARGINE 25 UNITS: 100 INJECTION, SOLUTION SUBCUTANEOUS at 05:31

## 2017-09-09 RX ADMIN — Medication 2 PUFF: at 16:44

## 2017-09-09 RX ADMIN — INSULIN LISPRO 12 UNITS: 100 INJECTION, SOLUTION INTRAVENOUS; SUBCUTANEOUS at 06:44

## 2017-09-09 RX ADMIN — ACETYLCYSTEINE 600 MG: 200 SOLUTION ORAL; RESPIRATORY (INHALATION) at 21:40

## 2017-09-09 RX ADMIN — Medication 2 PUFF: at 13:14

## 2017-09-09 RX ADMIN — PRAVASTATIN SODIUM 80 MG: 80 TABLET ORAL at 20:54

## 2017-09-09 ASSESSMENT — PAIN SCALES - GENERAL: PAINLEVEL_OUTOF10: 0

## 2017-09-09 NOTE — PLAN OF CARE
Problem: RESPIRATORY  Goal: Lung sounds clear or within normal limits for patient  Outcome: Ongoing  Albuterol and Atrovent MDI's continued to help clear lungs.

## 2017-09-09 NOTE — PROGRESS NOTES
Hospitalist Progress Note      Patient:  Vladimir Brink      Unit/Bed:4K-08/008-A    YOB: 1950    MRN: [id number]       Acct: [de-identified]     PCP: Vira Begum MD    Date of Admission: 9/7/2017    Assessment/Plan:  1. Chest pain -- likely MSK and likely costochondritis as very TTP, trops (-) x 3 -- cont ASA  -- d/w Dr Iva Elizabeth 9/8 --> with pt's hx would like to do heart cath but creat up to 1.8 9/8 --> thus Dr Iva Elizabeth would like to keep ntg gtt (stopped 9/8), heparin gtt   -- Dr. Ketan Huitron would like dose of steroids for MSK/pleuritic CP --> given solumedrol 125 x 1 -- try to avoid further steroids given #2 -- CXR 9/7 (-)  -- echo 9/8/17 (p)  -- cp resolved 9/9 with above treatments  2. Hyperkalemia -- 9/9 up to 5.4 - kayexalate x 1 and repeat pm 9/9  3. Uncontrolled type 2 DM -- +hypoglycemia am 9/8 --> V-GO pump stopped and on lantus - cont lantus and SSI for now -  BS up 9/8 pm and 9/9 with steroids given with #1 -- last A1C 7/19/17 = 11.4 -- ?c/s endo but follows with Dr. Frantz Sutton in [facility] -- holding exenatide  4. HARDY on CKD stage III -- up to 1.9 on admission and was 1.0 on 7/22/17 (but was up to 3.3 on 7/17) -- hold bumex, HCTZ, aldactone - IVF and improving -- monitor fluid status closely with hx CHF  -- mucomyst per Dr. Iva Elizabeth for plans for [facility]   -- renal consulted 9/9 to help maximize for [facility] - would also benefit from outpt mgmt  5. CAD -- last cath 9/2016 per Dr. Iva Elizabeth with EF 20%, patent RCA, 40% ostial prox LM, Codominant circumflex artery with the stented area of the obtuse marginal, Severe stenosis at the ostium and proximal portion of the high first diagonal artery was about 1 mm in diameter, diffuse nonobstructive disease, proximal LAD about 50% eccentric narrowing of the mid LAD.   Distally, the LAD was patent still patent with about 60% ostial narrowing  -- with #1 per d/w Dr. Iva Elizabeth would like to do LHC   -- cont heparin gtt, ntg gtt stopped 9/8  -- coreg, cozaar, ASA, statin  6. Leukocytosis -- likely steroid related - afeb - monitor  7. Elevated lipase -- up to 309 on 9/7 -- ?significant and ?contrib to CP but NO GI sx  -- rest of LFT WNL 9/7 -- admit with probable gallstone pancreatitis in 7/2017 --> cont monitor sx - ?repeat imaging but will hold as has NO sx of abd issues  8. Chronic systolic and diastolic CHF/non-ischemic CM -- last echo 9/2016 with EF 44-60%, grade 1 diastolic dysfxn, mod MR -- fluid status stable   -- hold bumex, aldactone, HCTZ with HARDY and requested by DR. Tamayo -- cont coreg, cozaar  -- echo 9/8/17 (p)  -- ? ICD  - ? lifevest - await new echo  9. Essential HTN -- cont coreg, cozaar, hydralazine -- ntg gtt for #1 - - monitor and adjust prn as home diuretics held  10. Normocytic anemia -- chronic component with baseline ~10-12's -- monitor on heparin gtt  11. HLP -- lipids 9/8/17 acceptable -- statin  12. Hx mild gastritis -- per EGD by Dr. Kevon Lopez 7/20/17 -- PPI  13. Hypothyroidism -- cont synthroid -- last TSH slightly elevated 8/24 - cont monitor  14. ALANA -- cont home cpap    Dispo  -- 9/9 --> apprec consultants - unable to do cath today thus per pt to be done on 9/11 --> cont med mgmt of ?angina - pain gone with above treatments.   C/s renal for assist with maximizing renal status for cath - cont IVF and mucomyst for now -- cont monitor BP, lytes, renal fxn, h/h and await cath 9/11    --9/8 --> d/w Dr. Iva Elizabeth - would like to do LHC but creat up to 1.8 --> requesting hold diuretics, cont IVF, mucomyst and monitor creat - also requested dose of steroids for pleuritic pain - solumedrol x 1 given and will monitor BS closely as very labile with low BS this am and V-go pump off -- cont monitor lytes, renal fxn, BP, BS.        -----------------------------------------------------------------------------------------------    Chief Complaint: chest pain    Hospital Course: PT with hx CAD and cardiomyopathy with ER 20%, type 2 DM on V-go insulin pump admitted with midsternal cp with radiation to left shoulder blade and epigastrum. Started on heparin and ntg gtt's. Had Rapid response called this am for hypoglycemia down to 23 -- pt with V-GO and device stopped. CArdiology c/s completed 9/8 by Dr. Austin Naik who recommends cath but with HARDY creat 1.8 am 9/8 thus IVF, stopped diuretics and re-eval tomorrow. He also requested dose of steroids 9/8 for pleuritic chest pain and ?pericarditis. Echo (p).  -- 9/9 --> chest pain gone with above tx - per cardio plan cath 9/11    Subjective:   -- 9/9 --> doing MUCH better and the mid sternal CP that was very TTP is gone today - no other cp, no sob. Moving w/o issues - no abd pain, no n/v/f/c. Abdelrahman po. Afeb, on RA - heart cath moved to Monday. BS still high.    -- 9/8 --> pt doing better - still with some mid sternal cp but only worsens with coughing. No production with cough. No n/v/f/c. Abdelrahman po. No CP with exertion. No lightheaded or dizziness.   BS improved      Medications:  Reviewed    Infusion Medications    sodium chloride 100 mL/hr at 09/09/17 0757    dextrose      heparin (porcine) 15 Units/kg/hr (09/09/17 0735)    nitroGLYCERIN 5 mcg/min (09/08/17 1003)     Scheduled Medications    acetylcysteine  600 mg Oral BID    metoprolol tartrate  25 mg Oral Once    carvedilol  12.5 mg Oral BID WC    hydrALAZINE  10 mg Oral TID    insulin glargine  25 Units Subcutaneous Daily    insulin lispro  5-17 Units Subcutaneous TID WC    levothyroxine  25 mcg Oral Daily    pantoprazole  40 mg Oral QAM AC    pravastatin  80 mg Oral Daily    sodium chloride flush  10 mL Intravenous 2 times per day    albuterol sulfate HFA  2 puff Inhalation 4x daily    And    ipratropium  2 puff Inhalation 4x daily    aspirin  81 mg Oral Daily     PRN Meds: dextrose, heparin (porcine), heparin (porcine), albuterol sulfate HFA, sodium chloride flush, acetaminophen, magnesium hydroxide, ondansetron, potassium chloride **OR** potassium chloride **OR** potassium chloride, magnesium sulfate, nitroGLYCERIN, glucose, glucagon (rDNA), dextrose      Intake/Output Summary (Last 24 hours) at 09/09/17 1216  Last data filed at 09/09/17 0427   Gross per 24 hour   Intake          3473.08 ml   Output             2150 ml   Net          1323.08 ml       Diet:  DIET LOW SODIUM 2 GM; Exam:  BP (!) 140/66  Pulse 78  Temp 97.8 °F (36.6 °C) (Oral)   Resp 16  Ht 5' 9\" (1.753 m)  Wt 184 lb 8 oz (83.7 kg)  SpO2 96%  BMI 27.25 kg/m2 RA    General appearance: No apparent distress, appears older than stated age and cooperative. HEENT: Pupils equal, round, and reactive to light. Conjunctivae/corneas clear. Neck: Supple, with full range of motion. No jugular venous distention. Trachea midline. Respiratory:  Normal respiratory effort. Faint exp wheeze, no rales or rhonchi. No respiratory distress or accessory muscle use. Cardiovascular: Regular rate and rhythm with ectopy with normal S1/S2 without murmurs, rubs or gallops. Chest wall NO TTP as of 9/9 (much improved from 9/8 exam)  Abdomen: Soft, non-tender, non-distended with normal bowel sounds. No rebound or guarding  Musculoskeletal: No clubbing, cyanosis, trace edema bilaterally. Full range of motion without deformity. No calf tenderness palpation  Skin: Skin color, texture, turgor normal.  No rashes or lesions. Neurologic:  Neurovascularly intact without any focal sensory/motor deficits.  Cranial nerves: II-XII intact, grossly non-focal.  Psychiatric: Alert and oriented, thought content appropriate, normal insight  Capillary Refill: Brisk,< 3 seconds   Peripheral Pulses: +2 palpable, equal bilaterally       Labs:   Recent Labs      09/07/17   1205  09/08/17   0357  09/09/17   0353   WBC  10.4  9.7  12.2*   HGB  12.1*  10.9*  10.6*   HCT  37.0*  31.8*  32.4*   PLT  393  357  340     Recent Labs      09/08/17   0357  09/09/17   0353  09/09/17   0717   NA  140  138  138   K  3.9  5.4*  4.8   CL  106  98  97*   CO2 29  23  23   BUN  46*  45*  44*   CREATININE  1.8*  1.4*  1.7*   CALCIUM  8.5  8.7  8.7     Recent Labs      09/07/17   1205   AST  28   ALT  48   BILITOT  0.5   ALKPHOS  97     No results for input(s): INR in the last 72 hours. Recent Labs      09/07/17   1205   TROPONINI  0.07       Urinalysis:      Lab Results   Component Value Date    NITRU NEGATIVE 07/17/2017    WBCUA 0-2 07/17/2017    BACTERIA NONE 07/17/2017    RBCUA NONE SEEN 07/17/2017    BLOODU NEGATIVE 07/17/2017    SPECGRAV 1.024 07/17/2017       Radiology:  XR Chest Portable   Final Result   No acute findings, stable from the previous study of 7/18/2017. **This report has been created using voice recognition software. It may contain minor errors which are inherent in voice recognition technology. **      Final report electronically signed by Dr. Talisha Pitt on 9/7/2017 12:43 PM          Diet: DIET LOW SODIUM 2 GM;     Jordan: none    Microbiology:  none    Tele:  SR with PAC's then with 5 beats VT last 24 hrs    DVT prophylaxis: [] Lovenox                                 [] SCDs                                 [] SQ Heparin                                 [] Encourage ambulation           [x] Already on Anticoagulation -- heparin gtt     Disposition:    [x] Home       [] TCU       [] Rehab       [] Psych       [] SNF       [] [place]       [] Other-    Code Status: Full Code    PT/OT Eval Status: monitor for need          Electronically signed by Adelaide Taveras MD on 9/9/2017 at 12:16 PM when pt evaluated - final note filed late 9/10/2017

## 2017-09-09 NOTE — FLOWSHEET NOTE
09/09/17 5768   Provider Notification   Reason for Communication Critical Value (comment)   Provider Name Dr. Adina Norman   Provider Notification Physician   Method of Communication Call   Response See orders   Notification Time [id number]   Updated Dr. Adina Norman on how blood sugars were still in the 500's, indicated to give 12 more units and to do a repeat BMP in one hour

## 2017-09-10 LAB
ALBUMIN SERPL-MCNC: 3.2 G/DL (ref 3.5–5.1)
ALP BLD-CCNC: 66 U/L (ref 38–126)
ALT SERPL-CCNC: 16 U/L (ref 11–66)
ANION GAP SERPL CALCULATED.3IONS-SCNC: 12 MEQ/L (ref 8–16)
APTT: 35.4 SECONDS (ref 22–38)
APTT: 76 SECONDS (ref 22–38)
AST SERPL-CCNC: 13 U/L (ref 5–40)
BILIRUB SERPL-MCNC: 0.2 MG/DL (ref 0.3–1.2)
BUN BLDV-MCNC: 34 MG/DL (ref 7–22)
CALCIUM SERPL-MCNC: 8.5 MG/DL (ref 8.5–10.5)
CHLORIDE BLD-SCNC: 105 MEQ/L (ref 98–111)
CO2: 25 MEQ/L (ref 23–33)
CREAT SERPL-MCNC: 1.5 MG/DL (ref 0.4–1.2)
GFR SERPL CREATININE-BSD FRML MDRD: 46 ML/MIN/1.73M2
GLUCOSE BLD-MCNC: 148 MG/DL (ref 70–108)
GLUCOSE BLD-MCNC: 217 MG/DL (ref 70–108)
GLUCOSE BLD-MCNC: 221 MG/DL (ref 70–108)
GLUCOSE BLD-MCNC: 265 MG/DL (ref 70–108)
GLUCOSE BLD-MCNC: 297 MG/DL (ref 70–108)
GLUCOSE BLD-MCNC: 335 MG/DL (ref 70–108)
POTASSIUM SERPL-SCNC: 4.4 MEQ/L (ref 3.5–5.2)
SODIUM BLD-SCNC: 142 MEQ/L (ref 135–145)
TOTAL PROTEIN: 6 G/DL (ref 6.1–8)

## 2017-09-10 PROCEDURE — 1200000003 HC TELEMETRY R&B

## 2017-09-10 PROCEDURE — 82948 REAGENT STRIP/BLOOD GLUCOSE: CPT

## 2017-09-10 PROCEDURE — 6370000000 HC RX 637 (ALT 250 FOR IP): Performed by: INTERNAL MEDICINE

## 2017-09-10 PROCEDURE — 2580000003 HC RX 258: Performed by: EMERGENCY MEDICINE

## 2017-09-10 PROCEDURE — 36415 COLL VENOUS BLD VENIPUNCTURE: CPT

## 2017-09-10 PROCEDURE — 6360000002 HC RX W HCPCS: Performed by: FAMILY MEDICINE

## 2017-09-10 PROCEDURE — 99232 SBSQ HOSP IP/OBS MODERATE 35: CPT | Performed by: FAMILY MEDICINE

## 2017-09-10 PROCEDURE — 99232 SBSQ HOSP IP/OBS MODERATE 35: CPT | Performed by: INTERNAL MEDICINE

## 2017-09-10 PROCEDURE — 85730 THROMBOPLASTIN TIME PARTIAL: CPT

## 2017-09-10 PROCEDURE — 94640 AIRWAY INHALATION TREATMENT: CPT

## 2017-09-10 PROCEDURE — 6360000002 HC RX W HCPCS: Performed by: INTERNAL MEDICINE

## 2017-09-10 PROCEDURE — 80053 COMPREHEN METABOLIC PANEL: CPT

## 2017-09-10 RX ORDER — INSULIN GLARGINE 100 [IU]/ML
35 INJECTION, SOLUTION SUBCUTANEOUS DAILY
Status: DISCONTINUED | OUTPATIENT
Start: 2017-09-11 | End: 2017-09-11

## 2017-09-10 RX ADMIN — HYDRALAZINE HYDROCHLORIDE 10 MG: 10 TABLET, FILM COATED ORAL at 13:30

## 2017-09-10 RX ADMIN — Medication 2 PUFF: at 08:54

## 2017-09-10 RX ADMIN — SODIUM CHLORIDE: 9 INJECTION, SOLUTION INTRAVENOUS at 15:43

## 2017-09-10 RX ADMIN — Medication 2 PUFF: at 13:14

## 2017-09-10 RX ADMIN — PANTOPRAZOLE SODIUM 40 MG: 40 TABLET, DELAYED RELEASE ORAL at 05:45

## 2017-09-10 RX ADMIN — HEPARIN SODIUM AND DEXTROSE 18 UNITS/KG/HR: 10000; 5 INJECTION INTRAVENOUS at 23:26

## 2017-09-10 RX ADMIN — Medication 2 PUFF: at 16:49

## 2017-09-10 RX ADMIN — HYDRALAZINE HYDROCHLORIDE 10 MG: 10 TABLET, FILM COATED ORAL at 08:02

## 2017-09-10 RX ADMIN — Medication 13 UNITS: at 08:09

## 2017-09-10 RX ADMIN — PRAVASTATIN SODIUM 80 MG: 80 TABLET ORAL at 20:26

## 2017-09-10 RX ADMIN — ASPIRIN 81 MG: 81 TABLET, CHEWABLE ORAL at 08:02

## 2017-09-10 RX ADMIN — LEVOTHYROXINE SODIUM 25 MCG: 25 TABLET ORAL at 05:45

## 2017-09-10 RX ADMIN — INSULIN GLARGINE 25 UNITS: 100 INJECTION, SOLUTION SUBCUTANEOUS at 08:09

## 2017-09-10 RX ADMIN — ACETYLCYSTEINE 600 MG: 200 SOLUTION ORAL; RESPIRATORY (INHALATION) at 20:26

## 2017-09-10 RX ADMIN — HYDRALAZINE HYDROCHLORIDE 10 MG: 10 TABLET, FILM COATED ORAL at 20:26

## 2017-09-10 RX ADMIN — ACETYLCYSTEINE 600 MG: 200 SOLUTION ORAL; RESPIRATORY (INHALATION) at 08:02

## 2017-09-10 RX ADMIN — Medication 11 UNITS: at 10:36

## 2017-09-10 RX ADMIN — SODIUM CHLORIDE: 9 INJECTION, SOLUTION INTRAVENOUS at 05:45

## 2017-09-10 RX ADMIN — Medication 9 UNITS: at 16:41

## 2017-09-10 RX ADMIN — CARVEDILOL 12.5 MG: 6.25 TABLET, FILM COATED ORAL at 16:46

## 2017-09-10 RX ADMIN — Medication 2 PUFF: at 20:38

## 2017-09-10 RX ADMIN — HEPARIN SODIUM 2000 UNITS: 1000 INJECTION, SOLUTION INTRAVENOUS; SUBCUTANEOUS at 23:31

## 2017-09-10 RX ADMIN — CARVEDILOL 12.5 MG: 6.25 TABLET, FILM COATED ORAL at 08:02

## 2017-09-10 NOTE — PROGRESS NOTES
Hospitalist Progress Note      Patient:  Christianne Mcdaniels      Unit/Bed:4K-08/008-A    YOB: 1950    MRN: [id number]       Acct: [de-identified]     PCP: Claudia Manriquez MD    Date of Admission: 9/7/2017    Assessment/Plan:  1. Chest pain -- likely MSK and likely costochondritis as very TTP, trops (-) x 3 -- cont ASA  -- d/w Dr Oh Bone 9/8 --> with pt's hx would like to do heart cath but creat up to 1.8 9/8 --> thus Dr Oh Bone would like to keep ntg gtt (stopped 9/8), heparin gtt   -- Dr. Diane Ricci would like dose of steroids for MSK/pleuritic CP --> given solumedrol 125 x 1 and pain significantly improved -- try to avoid further steroids given #2 -- CXR 9/7 (-)  -- echo 9/8/17 (p)  -- cp resolved 9/9 with above treatments  2. Hyperkalemia -- 9/9 up to 5.4 - kayexalate x 1 and repeat pm 9/9 improved  3. Uncontrolled type 2 DM -- +hypoglycemia am 9/8 --> V-GO pump stopped and on lantus - cont lantus and SSI for now -  BS up 9/8 pm and 9/9 with steroids given with #1 - lantus increased for 9/11 am to 35 units - ?resume V-go after cath -- last A1C 7/19/17 = 11.4 -- ?c/s endo but follows with Dr. Mahendra Dowling in [facility] -- holding exenatide  4. HARDY on CKD stage III -- up to 1.9 on admission and improving to 1.5 on 9/10 --> and was 1.0 on 7/22/17 (but was up to 3.3 on 7/17) -- hold bumex, HCTZ, aldactone - IVF and improving -- monitor fluid status closely with hx CHF  -- mucomyst per Dr. Oh Bone for plans for [address] [facility]   -- renal consulted 9/9 to help maximize for [facility] - would also benefit from outpt mgmt  5. CAD -- last cath 9/2016 per Dr. Oh Bone with EF 20%, patent RCA, 40% ostial prox LM, Codominant circumflex artery with the stented area of the obtuse marginal, Severe stenosis at the ostium and proximal portion of the high first diagonal artery was about 1 mm in diameter, diffuse nonobstructive disease, proximal LAD about 50% eccentric narrowing of the mid LAD.   Distally, the LAD was patent still patent with about 60% monitor lytes, renal fxn, BP, BS.        -----------------------------------------------------------------------------------------------    Chief Complaint: chest pain    Hospital Course: PT with hx CAD and cardiomyopathy with ER 20%, type 2 DM on V-go insulin pump admitted with midsternal cp with radiation to left shoulder blade and epigastrum. Started on heparin and ntg gtt's. Had Rapid response called this am for hypoglycemia down to 23 -- pt with V-GO and device stopped. CArdiology c/s completed 9/8 by Dr. Sandy Casey who recommends cath but with HARDY creat 1.8 am 9/8 thus IVF, stopped diuretics and re-eval tomorrow. He also requested dose of steroids 9/8 for pleuritic chest pain and ?pericarditis. Echo (p).  -- 9/9 --> chest pain gone with above tx - per cardio plan cath 9/11    Subjective:   -- 9/10 --> up in chair - feels \"great\". No further cp - no sob. No n/v/f/c. Abdelrahman po. No lightheaded/dizziness - afeb, on RA    -- 9/9 --> doing MUCH better and the mid sternal CP that was very TTP is gone today - no other cp, no sob. Moving w/o issues - no abd pain, no n/v/f/c. Abdelrahman po. Afeb, on RA - heart cath moved to Monday. BS still high.    -- 9/8 --> pt doing better - still with some mid sternal cp but only worsens with coughing. No production with cough. No n/v/f/c. Abdelrahman po. No CP with exertion. No lightheaded or dizziness.   BS improved      Medications:  Reviewed    Infusion Medications    sodium chloride 100 mL/hr at 09/10/17 1543    dextrose      heparin (porcine) 15 Units/kg/hr (09/10/17 0925)    nitroGLYCERIN 5 mcg/min (09/08/17 1003)     Scheduled Medications    [START ON 9/11/2017] insulin glargine  35 Units Subcutaneous Daily    acetylcysteine  600 mg Oral BID    metoprolol tartrate  25 mg Oral Once    carvedilol  12.5 mg Oral BID WC    hydrALAZINE  10 mg Oral TID    insulin lispro  5-17 Units Subcutaneous TID WC    levothyroxine  25 mcg Oral Daily    pantoprazole  40 mg Oral QAM AC  pravastatin  80 mg Oral Daily    sodium chloride flush  10 mL Intravenous 2 times per day    albuterol sulfate HFA  2 puff Inhalation 4x daily    And    ipratropium  2 puff Inhalation 4x daily    aspirin  81 mg Oral Daily     PRN Meds: dextrose, heparin (porcine), heparin (porcine), albuterol sulfate HFA, sodium chloride flush, acetaminophen, magnesium hydroxide, ondansetron, potassium chloride **OR** potassium chloride **OR** potassium chloride, magnesium sulfate, nitroGLYCERIN, glucose, glucagon (rDNA), dextrose      Intake/Output Summary (Last 24 hours) at 09/10/17 2325  Last data filed at 09/10/17 2245   Gross per 24 hour   Intake          3766.93 ml   Output             3275 ml   Net           491.93 ml       Diet:  DIET LOW SODIUM 2 GM; Exam:  BP (!) 170/71  Pulse 82  Temp 98.3 °F (36.8 °C) (Oral)   Resp 16  Ht 5' 9\" (1.753 m)  Wt 189 lb 4.8 oz (85.9 kg)  SpO2 96%  BMI 27.95 kg/m2 RA    General appearance: No apparent distress, appears older than stated age and cooperative. HEENT: Pupils equal, round, and reactive to light. Conjunctivae/corneas clear. Neck: Supple, with full range of motion. No jugular venous distention. Trachea midline. Respiratory:  Normal respiratory effort. Faint exp wheeze, no rales or rhonchi. No respiratory distress or accessory muscle use. Cardiovascular: Regular rate and rhythm with ectopy with normal S1/S2 without murmurs, rubs or gallops. Chest wall NO TTP as of 9/9 and still no TTP 9/10  (much improved from 9/8 exam)  Abdomen: Soft, non-tender, non-distended with normal bowel sounds. No rebound or guarding  Musculoskeletal: No clubbing, cyanosis, trace edema bilaterally. Full range of motion without deformity. No calf tenderness palpation  Skin: Skin color, texture, turgor normal.  No rashes or lesions. Neurologic:  Neurovascularly intact without any focal sensory/motor deficits.  Cranial nerves: II-XII intact, grossly non-focal.  Psychiatric: Alert and oriented, thought content appropriate, normal insight  Capillary Refill: Brisk,< 3 seconds   Peripheral Pulses: +2 palpable, equal bilaterally       Labs:   Recent Labs      09/08/17   0357  09/09/17   0353   WBC  9.7  12.2*   HGB  10.9*  10.6*   HCT  31.8*  32.4*   PLT  357  340     Recent Labs      09/09/17   0353  09/09/17   0717  09/10/17   0344   NA  138  138  142   K  5.4*  4.8  4.4   CL  98  97*  105   CO2  23  23  25   BUN  45*  44*  34*   CREATININE  1.4*  1.7*  1.5*   CALCIUM  8.7  8.7  8.5     Recent Labs      09/10/17   0344   AST  13   ALT  16   BILITOT  0.2*   ALKPHOS  66     No results for input(s): INR in the last 72 hours. No results for input(s): Deboraha Duarte in the last 72 hours. Urinalysis:      Lab Results   Component Value Date    NITRU NEGATIVE 07/17/2017    WBCUA 0-2 07/17/2017    BACTERIA NONE 07/17/2017    RBCUA NONE SEEN 07/17/2017    BLOODU NEGATIVE 07/17/2017    SPECGRAV 1.024 07/17/2017       Radiology:  XR Chest Portable   Final Result   No acute findings, stable from the previous study of 7/18/2017. **This report has been created using voice recognition software. It may contain minor errors which are inherent in voice recognition technology. **      Final report electronically signed by Dr. Sheryl Bee on 9/7/2017 12:43 PM          Diet: DIET LOW SODIUM 2 GM;     Jordan: none    Microbiology:  none    Tele:  SR with PAC's     DVT prophylaxis: [] Lovenox                                 [] SCDs                                 [] SQ Heparin                                 [] Encourage ambulation           [x] Already on Anticoagulation -- heparin gtt     Disposition:    [x] Home       [] TCU       [] Rehab       [] Psych       [] SNF       [] [place]       [] Other-    Code Status: Full Code    PT/OT Eval Status: monitor for need          Electronically signed by Marbella Vance MD on 9/10/2017

## 2017-09-10 NOTE — PLAN OF CARE
Problem: Pain:  Goal: Pain level will decrease  Pain level will decrease   Outcome: Ongoing  Pt is denying any chest pain so far this shift, vitals stable, will continue to assess. Goal: Control of acute pain  Control of acute pain   Outcome: Ongoing  Pt is denying any chest pain so far this shift, vitals stable, will continue to assess. Problem: Cardiovascular  Goal: No DVT, peripheral vascular complications  Outcome: Ongoing  No new s/sx of DVT so far this shift, pt has heparin drip running. Goal: Hemodynamic stability  Outcome: Ongoing  Pt is hemodynamically stable so far this shift, will continue to assess. Problem: Respiratory  Goal: O2 Sat > 90%  Outcome: Ongoing  Pt sating >90% on room air. Problem: Skin Integrity/Risk  Goal: No skin breakdown during hospitalization  Outcome: Ongoing  No new s/sx skin breakdown, pt repositions q2h and as needed, able to reposition self, will continue to assess. Problem: Musculor/Skeletal Functional Status  Goal: Absence of falls  Outcome: Ongoing  No falls so far this shift, falling star program in place, bed alarm on, call light within reach and used appropriately, bed locked/in lowest position, hourly rounding performed. Problem: Discharge Planning:  Goal: Discharged to appropriate level of care  Discharged to appropriate level of care   Outcome: Ongoing  Pt plans on returning home with his wife once he is medically stable. Comments:   Care plan reviewed with patient. Patient verbalizes understanding of the plan of care and contribute to goal setting.

## 2017-09-10 NOTE — PLAN OF CARE
Problem: RESPIRATORY  Goal: Lung sounds clear or within normal limits for patient  Outcome: Ongoing  Keep lungs clear of wheezes throughout

## 2017-09-10 NOTE — PROGRESS NOTES
[facility]    [facility]  [address]  [place], [address]  [address] [facility] Progress Note      9/10/2017 8:10 AM         Pt Name:    Trever Devries  MRN:     [id number]   [id number]  YOB: 1950  Admit Date:    9/7/2017 11:53 AM  Primary Care Physician:  Dinora Talbot MD   Room Number:  4K-08/008-A   Reason for Consult:  Decreased eGFR  Requesting Providor: Dr. Anne Oseguera  Primary Nephrologist: Dr. Colette Delarosa    ### ISOLATION ###:   none     Admit Chief Complaint: chest pain     History of Chief Complaint:   The patient is a 69y.o. year old white male who is [facility] from DM and HTN. He is compliant with medications and office visits. He developed sudden onset chest pain and presented to ER. His troponin level was normal three times. However, due to his almost 25 year history of diabetes, heart cath was deemed necessary. His eGFR dropped a little. He has no trouble at bladder emptying and only has 1-3 times per night nocturia. 24 hour summary:  The patient was relaxing in bed eating breakfast. He feels well and denied N/V/C/D/SOB/CP. His eGFR is improved. Baseline eGFR 55-65 ml/min   Admit eGFR 40 ml/min   Current eGFR 46 ml/min     Allergies and Intolerances:   ALLERGIES: Amlodipine  MEDICATION INTOLERANCES: none  FOOD ALLERGIES: none  INSECT ALLERGIES: none  PLANT ALLERGIES: none     Past Medical History:  Past Medical History:   Diagnosis Date    Gonsalves's esophagus     CAD (coronary artery disease)     s/p stent placement    COPD (chronic obstructive pulmonary disease) (HCC)     Hyperlipidemia     Hypertension     Lung nodule     Osteoarthritis     Sleep apnea     Tobacco abuse     Type II or unspecified type diabetes mellitus without mention of complication, not stated as uncontrolled         Past Surgical History:  Past Surgical History:   Procedure Laterality Date    CARPAL TUNNEL RELEASE      Yes Historical Provider, MD   carvedilol (COREG) 12.5 MG tablet Take 12.5 mg by mouth 2 times daily (with meals)   Yes Historical Provider, MD   bumetanide (BUMEX) 1 MG tablet Take 1 mg by mouth 2 times daily    Yes Historical Provider, MD   spironolactone (ALDACTONE) 25 MG tablet Take 25 mg by mouth daily   Yes Historical Provider, MD   levothyroxine (SYNTHROID) 25 MCG tablet Take 25 mcg by mouth daily   Yes Historical Provider, MD   albuterol sulfate  (90 BASE) MCG/ACT inhaler Inhale 2 puffs into the lungs every 6 hours as needed for Wheezing 1/31/17  Yes Juan Orellana MD   Deandre Mings 18 MCG inhalation capsule INHALE 1 CAPSULE INTO THE LUNGS DAILY 1/19/17  Yes Juan Orellana MD   Exenatide (BYDUREON) 2 MG PEN Inject 2 mg into the skin once a week Takes on Sunday    Yes Historical Provider, MD   acetaminophen (TYLENOL) 325 MG tablet Take 650 mg by mouth every 6 hours as needed for Pain   Yes Historical Provider, MD   pravastatin (PRAVACHOL) 80 MG tablet Take 1 tablet by mouth daily 9/1/16  Yes Pool Martin MD   omeprazole (PRILOSEC) 20 MG capsule Take 1 capsule by mouth every morning (before breakfast) 7/14/16  Yes BLANKA Dunn   Multiple Vitamin (MULTI VITAMIN MENS PO) Take 1 tablet by mouth daily    Yes Historical Provider, MD   hydrALAZINE (APRESOLINE) 10 MG tablet Take 10 mg by mouth 3 times daily. Yes Historical Provider, MD   aspirin 81 MG EC tablet Take 81 mg by mouth daily. Yes Historical Provider, MD   glucose monitoring kit (FREESTYLE) monitoring kit Need one glucometer and 100 testing strips for 3-4 times per day testing.   Diagnosis is diabetes type 2 7/23/17   Josh Alva, DO   Lancets MISC 1 each by Does not apply route daily 7/23/17   Josh Alva, DO   Insulin Syringe-Needle U-100 (INSULIN SYRINGE .3CC/31GX5/16\") 31G X 5/16\" 0.3 ML MISC 1 each by Does not apply route daily 7/23/17   Josh Alva, DO   CPAP Machine MISC by Does not apply route Please change CPAP pressure to 13 cm H20. 1/6/17   Batsheva Dietrich MD        Lab data:  CBC:    Recent Labs      09/07/17   1205  09/08/17   0357  09/09/17   0353   WBC  10.4  9.7  12.2*   HGB  12.1*  10.9*  10.6*   PLT  393  357  340     CMP:    Recent Labs      09/09/17   0353  09/09/17   0717  09/10/17   0344   NA  138  138  142   K  5.4*  4.8  4.4   CL  98  97*  105   CO2  23  23  25   BUN  45*  44*  34*   CREATININE  1.4*  1.7*  1.5*   GLUCOSE  572*  537*  265*   CALCIUM  8.7  8.7  8.5     Urine:No results for input(s): COLORU, PHUR, LABCAST, WBCUA, RBCUA, MUCUS, TRICHOMONAS, YEAST, BACTERIA, CLARITYU, SPECGRAV, LEUKOCYTESUR, UROBILINOGEN, BILIRUBINUR, BLOODU in the last 72 hours. Invalid input(s): NITRATE, GLUCOSEUKETONESUAMORPHOUS   Sed Rate: No results for input(s): SEDRATE in the last 72 hours. Radiology       Review of Systems:  Source of data: patient and chart    CONSTITUTIONAL  Fever: none, Chills: none, Weight loss: none, Malaise: none, Fatigue: yes, Diaphoresis: none,   Weakness: none    SKIN  Rash: none, Itch: none, Open sores: none    HENT:  Headache: none, Hearing loss: none, Tinnitus: none, Ear pain: none, Ear discharge: none,   Nasal bleeding: none, Congestion: none, Stridor: none, Sore throat: none. EYES  Blurred vision: none, Double vision: none, Photophobia: none, Eye pain: none, Eye discharge: none,  Eye redness: none. CARDIOVASCULAR  Chest pain: yes, Arm pain: none, Palpitations: none, Orthopnea: none, Claudication: none,   Leg swelling: none, PND: none. RESPIRATORY  Cough: none, Hemoptysis: none, Sputum production: none, Shortness of breath: none, Wheezing: none    GASTROINTESTINAL  Heartburn: none, Nausea: none, Vomiting: none, Abdominal pain: none, Diarrhea: none,   Constipation: none, Blood in the stool: none, Melena: none, Rebound: none, Rovsing's: none.     GENITOURINARY  Dysuria: none, Pyuria: none, Gross hematuria: none, Urgency: none, Flank pain: none,

## 2017-09-10 NOTE — PLAN OF CARE
Problem: RESPIRATORY  Goal: Lung sounds clear or within normal limits for patient  Outcome: Ongoing  Continue therapy as ordered to improve/maintain clear breath sounds.

## 2017-09-11 ENCOUNTER — APPOINTMENT (OUTPATIENT)
Dept: GENERAL RADIOLOGY | Age: 67
DRG: 208 | End: 2017-09-11
Payer: MEDICARE

## 2017-09-11 ENCOUNTER — ANESTHESIA (OUTPATIENT)
Dept: TELEMETRY | Age: 67
End: 2017-09-11

## 2017-09-11 ENCOUNTER — ANESTHESIA EVENT (OUTPATIENT)
Dept: TELEMETRY | Age: 67
End: 2017-09-11

## 2017-09-11 PROBLEM — J96.02 ACUTE RESPIRATORY FAILURE WITH HYPOXIA AND HYPERCARBIA (HCC): Status: ACTIVE | Noted: 2017-09-11

## 2017-09-11 PROBLEM — K85.90 PANCREATITIS: Status: RESOLVED | Noted: 2017-07-18 | Resolved: 2017-09-11

## 2017-09-11 PROBLEM — I50.1 PULMONARY EDEMA CARDIAC CAUSE (HCC): Status: ACTIVE | Noted: 2017-09-11

## 2017-09-11 PROBLEM — J96.01 ACUTE RESPIRATORY FAILURE WITH HYPOXIA AND HYPERCARBIA (HCC): Status: ACTIVE | Noted: 2017-09-11

## 2017-09-11 LAB
ALBUMIN SERPL-MCNC: 3.3 G/DL (ref 3.5–5.1)
ALLEN TEST: POSITIVE
ALLEN TEST: POSITIVE
ALP BLD-CCNC: 85 U/L (ref 38–126)
ALT SERPL-CCNC: 24 U/L (ref 11–66)
ANION GAP SERPL CALCULATED.3IONS-SCNC: 16 MEQ/L (ref 8–16)
ANION GAP SERPL CALCULATED.3IONS-SCNC: 8 MEQ/L (ref 8–16)
APTT: 117.3 SECONDS (ref 22–38)
APTT: 68.4 SECONDS (ref 22–38)
APTT: 71.3 SECONDS (ref 22–38)
AST SERPL-CCNC: 26 U/L (ref 5–40)
BACTERIA: ABNORMAL /HPF
BACTERIA: ABNORMAL /HPF
BASE EXCESS (CALCULATED): -0.5 MMOL/L (ref -2.5–2.5)
BASE EXCESS (CALCULATED): -1 MMOL/L (ref -2.5–2.5)
BILIRUB SERPL-MCNC: < 0.2 MG/DL (ref 0.3–1.2)
BILIRUBIN URINE: NEGATIVE
BILIRUBIN URINE: NEGATIVE
BLOOD, URINE: ABNORMAL
BLOOD, URINE: NEGATIVE
BUN BLDV-MCNC: 22 MG/DL (ref 7–22)
BUN BLDV-MCNC: 23 MG/DL (ref 7–22)
CALCIUM IONIZED: 1.12 MMOL/L (ref 1.12–1.32)
CALCIUM SERPL-MCNC: 8.4 MG/DL (ref 8.5–10.5)
CALCIUM SERPL-MCNC: 8.9 MG/DL (ref 8.5–10.5)
CASTS 2: ABNORMAL /LPF
CASTS 2: ABNORMAL /LPF
CASTS UA: ABNORMAL /LPF
CASTS UA: ABNORMAL /LPF
CHARACTER, URINE: ABNORMAL
CHARACTER, URINE: CLEAR
CHLORIDE BLD-SCNC: 104 MEQ/L (ref 98–111)
CHLORIDE BLD-SCNC: 104 MEQ/L (ref 98–111)
CO2: 21 MEQ/L (ref 23–33)
CO2: 29 MEQ/L (ref 23–33)
COLLECTED BY:: ABNORMAL
COLLECTED BY:: NORMAL
COLOR: YELLOW
COLOR: YELLOW
CREAT SERPL-MCNC: 1.1 MG/DL (ref 0.4–1.2)
CREAT SERPL-MCNC: 1.5 MG/DL (ref 0.4–1.2)
CRYSTALS, UA: ABNORMAL
CRYSTALS, UA: ABNORMAL
DEVICE: ABNORMAL
DEVICE: NORMAL
EKG ATRIAL RATE: 111 BPM
EKG ATRIAL RATE: 117 BPM
EKG ATRIAL RATE: 78 BPM
EKG ATRIAL RATE: 90 BPM
EKG P AXIS: 59 DEGREES
EKG P AXIS: 64 DEGREES
EKG P AXIS: 77 DEGREES
EKG P-R INTERVAL: 166 MS
EKG P-R INTERVAL: 170 MS
EKG P-R INTERVAL: 176 MS
EKG Q-T INTERVAL: 320 MS
EKG Q-T INTERVAL: 324 MS
EKG Q-T INTERVAL: 350 MS
EKG Q-T INTERVAL: 368 MS
EKG QRS DURATION: 70 MS
EKG QRS DURATION: 74 MS
EKG QRS DURATION: 76 MS
EKG QRS DURATION: 78 MS
EKG QTC CALCULATION (BAZETT): 416 MS
EKG QTC CALCULATION (BAZETT): 428 MS
EKG QTC CALCULATION (BAZETT): 435 MS
EKG QTC CALCULATION (BAZETT): 451 MS
EKG R AXIS: 36 DEGREES
EKG R AXIS: 66 DEGREES
EKG R AXIS: 68 DEGREES
EKG R AXIS: 74 DEGREES
EKG T AXIS: 64 DEGREES
EKG T AXIS: 75 DEGREES
EKG T AXIS: 77 DEGREES
EKG T AXIS: 87 DEGREES
EKG VENTRICULAR RATE: 111 BPM
EKG VENTRICULAR RATE: 117 BPM
EKG VENTRICULAR RATE: 77 BPM
EKG VENTRICULAR RATE: 90 BPM
EPITHELIAL CELLS, UA: ABNORMAL /HPF
EPITHELIAL CELLS, UA: ABNORMAL /HPF
GFR SERPL CREATININE-BSD FRML MDRD: 46 ML/MIN/1.73M2
GFR SERPL CREATININE-BSD FRML MDRD: 66 ML/MIN/1.73M2
GLUCOSE BLD-MCNC: 155 MG/DL (ref 70–108)
GLUCOSE BLD-MCNC: 270 MG/DL (ref 70–108)
GLUCOSE BLD-MCNC: 322 MG/DL (ref 70–108)
GLUCOSE BLD-MCNC: 323 MG/DL (ref 70–108)
GLUCOSE BLD-MCNC: 340 MG/DL (ref 70–108)
GLUCOSE BLD-MCNC: 84 MG/DL (ref 70–108)
GLUCOSE BLD-MCNC: 85 MG/DL (ref 70–108)
GLUCOSE URINE: 500 MG/DL
GLUCOSE URINE: >= 1000 MG/DL
HCO3: 25 MMOL/L (ref 23–28)
HCO3: 27 MMOL/L (ref 23–28)
HCT VFR BLD CALC: 35.6 % (ref 42–52)
HEMOGLOBIN: 11.7 GM/DL (ref 14–18)
IFIO2: 100
IFIO2: 40
KETONES, URINE: NEGATIVE
KETONES, URINE: NEGATIVE
LEUKOCYTE ESTERASE, URINE: ABNORMAL
LEUKOCYTE ESTERASE, URINE: NEGATIVE
MAGNESIUM: 1.8 MG/DL (ref 1.6–2.4)
MAGNESIUM: 2.2 MG/DL (ref 1.6–2.4)
MCH RBC QN AUTO: 29 PG (ref 27–31)
MCHC RBC AUTO-ENTMCNC: 32.9 GM/DL (ref 33–37)
MCV RBC AUTO: 88.1 FL (ref 80–94)
MISCELLANEOUS 2: ABNORMAL
MISCELLANEOUS 2: ABNORMAL
MODE: AC
MODE: AC
MRSA SCREEN RT-PCR: NEGATIVE
NITRITE, URINE: NEGATIVE
NITRITE, URINE: NEGATIVE
O2 SATURATION: 100 %
O2 SATURATION: 96 %
PCO2: 41 MMHG (ref 35–45)
PCO2: 63 MMHG (ref 35–45)
PDW BLD-RTO: 13.9 % (ref 11.5–14.5)
PH BLOOD GAS: 7.24 (ref 7.35–7.45)
PH BLOOD GAS: 7.39 (ref 7.35–7.45)
PH UA: 5
PH UA: 6
PHOSPHORUS: 2.5 MG/DL (ref 2.4–4.7)
PLATELET # BLD: 376 THOU/MM3 (ref 130–400)
PMV BLD AUTO: 7.7 MCM (ref 7.4–10.4)
PO2: 450 MMHG (ref 71–104)
PO2: 81 MMHG (ref 71–104)
POTASSIUM SERPL-SCNC: 3.6 MEQ/L (ref 3.5–5.2)
POTASSIUM SERPL-SCNC: 4.5 MEQ/L (ref 3.5–5.2)
POTASSIUM SERPL-SCNC: 5.1 MEQ/L (ref 3.5–5.2)
PROCALCITONIN: 0.28 NG/ML (ref 0.01–0.09)
PROTEIN UA: 30
PROTEIN UA: ABNORMAL
RBC # BLD: 4.05 MILL/MM3 (ref 4.7–6.1)
RBC URINE: ABNORMAL /HPF
RBC URINE: ABNORMAL /HPF
RENAL EPITHELIAL, UA: ABNORMAL
RENAL EPITHELIAL, UA: ABNORMAL
SET PEEP: 5 MMHG
SET PEEP: 5 MMHG
SET RESPIRATORY RATE: 14 BPM
SET RESPIRATORY RATE: 18 BPM
SET TIDAL VOLUME: 500 ML
SET TIDAL VOLUME: 550 ML
SODIUM BLD-SCNC: 141 MEQ/L (ref 135–145)
SODIUM BLD-SCNC: 141 MEQ/L (ref 135–145)
SOURCE, BLOOD GAS: ABNORMAL
SOURCE, BLOOD GAS: NORMAL
SPECIFIC GRAVITY, URINE: 1.01 (ref 1–1.03)
SPECIFIC GRAVITY, URINE: 1.02 (ref 1–1.03)
TOTAL PROTEIN: 6.1 G/DL (ref 6.1–8)
TROPONIN T: 0.05 NG/ML
TROPONIN T: < 0.01 NG/ML
UROBILINOGEN, URINE: 0.2 EU/DL
UROBILINOGEN, URINE: 0.2 EU/DL
WBC # BLD: 10.1 THOU/MM3 (ref 4.8–10.8)
WBC UA: ABNORMAL /HPF
WBC UA: ABNORMAL /HPF
YEAST: ABNORMAL
YEAST: ABNORMAL

## 2017-09-11 PROCEDURE — 76937 US GUIDE VASCULAR ACCESS: CPT

## 2017-09-11 PROCEDURE — 2580000003 HC RX 258: Performed by: INTERNAL MEDICINE

## 2017-09-11 PROCEDURE — C1751 CATH, INF, PER/CENT/MIDLINE: HCPCS

## 2017-09-11 PROCEDURE — 87205 SMEAR GRAM STAIN: CPT

## 2017-09-11 PROCEDURE — 6370000000 HC RX 637 (ALT 250 FOR IP): Performed by: NURSE PRACTITIONER

## 2017-09-11 PROCEDURE — 6370000000 HC RX 637 (ALT 250 FOR IP): Performed by: FAMILY MEDICINE

## 2017-09-11 PROCEDURE — 6370000000 HC RX 637 (ALT 250 FOR IP): Performed by: INTERNAL MEDICINE

## 2017-09-11 PROCEDURE — 2580000003 HC RX 258: Performed by: NURSE PRACTITIONER

## 2017-09-11 PROCEDURE — 87086 URINE CULTURE/COLONY COUNT: CPT

## 2017-09-11 PROCEDURE — 2100000000 HC CCU R&B

## 2017-09-11 PROCEDURE — 87070 CULTURE OTHR SPECIMN AEROBIC: CPT

## 2017-09-11 PROCEDURE — 6360000002 HC RX W HCPCS: Performed by: FAMILY MEDICINE

## 2017-09-11 PROCEDURE — 51702 INSERT TEMP BLADDER CATH: CPT

## 2017-09-11 PROCEDURE — 36569 INSJ PICC 5 YR+ W/O IMAGING: CPT

## 2017-09-11 PROCEDURE — 83735 ASSAY OF MAGNESIUM: CPT

## 2017-09-11 PROCEDURE — 71010 XR CHEST PORTABLE: CPT

## 2017-09-11 PROCEDURE — 87081 CULTURE SCREEN ONLY: CPT

## 2017-09-11 PROCEDURE — 87449 NOS EACH ORGANISM AG IA: CPT

## 2017-09-11 PROCEDURE — 6360000002 HC RX W HCPCS

## 2017-09-11 PROCEDURE — 87641 MR-STAPH DNA AMP PROBE: CPT

## 2017-09-11 PROCEDURE — 93005 ELECTROCARDIOGRAM TRACING: CPT

## 2017-09-11 PROCEDURE — A4649 SURGICAL SUPPLIES: HCPCS

## 2017-09-11 PROCEDURE — 6360000002 HC RX W HCPCS: Performed by: NURSE PRACTITIONER

## 2017-09-11 PROCEDURE — 80048 BASIC METABOLIC PNL TOTAL CA: CPT

## 2017-09-11 PROCEDURE — 80053 COMPREHEN METABOLIC PANEL: CPT

## 2017-09-11 PROCEDURE — C1769 GUIDE WIRE: HCPCS

## 2017-09-11 PROCEDURE — 82330 ASSAY OF CALCIUM: CPT

## 2017-09-11 PROCEDURE — 81001 URINALYSIS AUTO W/SCOPE: CPT

## 2017-09-11 PROCEDURE — A4212 NON CORING NEEDLE OR STYLET: HCPCS

## 2017-09-11 PROCEDURE — 87040 BLOOD CULTURE FOR BACTERIA: CPT

## 2017-09-11 PROCEDURE — 87899 AGENT NOS ASSAY W/OPTIC: CPT

## 2017-09-11 PROCEDURE — 2500000003 HC RX 250 WO HCPCS: Performed by: INTERNAL MEDICINE

## 2017-09-11 PROCEDURE — 84145 PROCALCITONIN (PCT): CPT

## 2017-09-11 PROCEDURE — 94002 VENT MGMT INPAT INIT DAY: CPT

## 2017-09-11 PROCEDURE — 36415 COLL VENOUS BLD VENIPUNCTURE: CPT

## 2017-09-11 PROCEDURE — 82948 REAGENT STRIP/BLOOD GLUCOSE: CPT

## 2017-09-11 PROCEDURE — 99233 SBSQ HOSP IP/OBS HIGH 50: CPT | Performed by: INTERNAL MEDICINE

## 2017-09-11 PROCEDURE — 6360000002 HC RX W HCPCS: Performed by: INTERNAL MEDICINE

## 2017-09-11 PROCEDURE — 82803 BLOOD GASES ANY COMBINATION: CPT

## 2017-09-11 PROCEDURE — 99232 SBSQ HOSP IP/OBS MODERATE 35: CPT | Performed by: INTERNAL MEDICINE

## 2017-09-11 PROCEDURE — 02HV33Z INSERTION OF INFUSION DEVICE INTO SUPERIOR VENA CAVA, PERCUTANEOUS APPROACH: ICD-10-PCS | Performed by: INTERNAL MEDICINE

## 2017-09-11 PROCEDURE — 84132 ASSAY OF SERUM POTASSIUM: CPT

## 2017-09-11 PROCEDURE — 84484 ASSAY OF TROPONIN QUANT: CPT

## 2017-09-11 PROCEDURE — 94640 AIRWAY INHALATION TREATMENT: CPT

## 2017-09-11 PROCEDURE — 85730 THROMBOPLASTIN TIME PARTIAL: CPT

## 2017-09-11 PROCEDURE — 84100 ASSAY OF PHOSPHORUS: CPT

## 2017-09-11 PROCEDURE — 85027 COMPLETE CBC AUTOMATED: CPT

## 2017-09-11 PROCEDURE — 36600 WITHDRAWAL OF ARTERIAL BLOOD: CPT

## 2017-09-11 PROCEDURE — 99223 1ST HOSP IP/OBS HIGH 75: CPT | Performed by: INTERNAL MEDICINE

## 2017-09-11 PROCEDURE — 2500000003 HC RX 250 WO HCPCS: Performed by: NURSE PRACTITIONER

## 2017-09-11 RX ORDER — ETOMIDATE 2 MG/ML
INJECTION INTRAVENOUS DAILY PRN
Status: DISCONTINUED | OUTPATIENT
Start: 2017-09-11 | End: 2017-09-11

## 2017-09-11 RX ORDER — PROPOFOL 10 MG/ML
INJECTION, EMULSION INTRAVENOUS CONTINUOUS PRN
Status: DISCONTINUED | OUTPATIENT
Start: 2017-09-11 | End: 2017-09-11

## 2017-09-11 RX ORDER — PANTOPRAZOLE SODIUM 40 MG/10ML
40 INJECTION, POWDER, LYOPHILIZED, FOR SOLUTION INTRAVENOUS DAILY
Status: DISCONTINUED | OUTPATIENT
Start: 2017-09-12 | End: 2017-09-12

## 2017-09-11 RX ORDER — LIDOCAINE HYDROCHLORIDE 10 MG/ML
5 INJECTION, SOLUTION EPIDURAL; INFILTRATION; INTRACAUDAL; PERINEURAL ONCE
Status: DISCONTINUED | OUTPATIENT
Start: 2017-09-11 | End: 2017-09-11 | Stop reason: ALTCHOICE

## 2017-09-11 RX ORDER — LIDOCAINE HYDROCHLORIDE 10 MG/ML
5 INJECTION, SOLUTION EPIDURAL; INFILTRATION; INTRACAUDAL; PERINEURAL ONCE
Status: DISCONTINUED | OUTPATIENT
Start: 2017-09-11 | End: 2017-09-13

## 2017-09-11 RX ORDER — ACETYLCYSTEINE 200 MG/ML
1200 SOLUTION ORAL; RESPIRATORY (INHALATION) 2 TIMES DAILY
Status: DISCONTINUED | OUTPATIENT
Start: 2017-09-11 | End: 2017-09-11

## 2017-09-11 RX ORDER — SODIUM CHLORIDE 0.9 % (FLUSH) 0.9 %
10 SYRINGE (ML) INJECTION PRN
Status: DISCONTINUED | OUTPATIENT
Start: 2017-09-11 | End: 2017-09-11 | Stop reason: SDUPTHER

## 2017-09-11 RX ORDER — FUROSEMIDE 10 MG/ML
INJECTION INTRAMUSCULAR; INTRAVENOUS DAILY PRN
Status: DISCONTINUED | OUTPATIENT
Start: 2017-09-11 | End: 2017-09-11

## 2017-09-11 RX ORDER — INSULIN GLARGINE 100 [IU]/ML
20 INJECTION, SOLUTION SUBCUTANEOUS 2 TIMES DAILY
Status: DISCONTINUED | OUTPATIENT
Start: 2017-09-11 | End: 2017-09-11

## 2017-09-11 RX ORDER — SODIUM CHLORIDE 0.9 % (FLUSH) 0.9 %
10 SYRINGE (ML) INJECTION EVERY 12 HOURS SCHEDULED
Status: DISCONTINUED | OUTPATIENT
Start: 2017-09-11 | End: 2017-09-11 | Stop reason: ALTCHOICE

## 2017-09-11 RX ORDER — FUROSEMIDE 10 MG/ML
40 INJECTION INTRAMUSCULAR; INTRAVENOUS 2 TIMES DAILY
Status: DISCONTINUED | OUTPATIENT
Start: 2017-09-11 | End: 2017-09-11

## 2017-09-11 RX ORDER — IPRATROPIUM BROMIDE AND ALBUTEROL SULFATE 2.5; .5 MG/3ML; MG/3ML
1 SOLUTION RESPIRATORY (INHALATION) EVERY 4 HOURS
Status: DISCONTINUED | OUTPATIENT
Start: 2017-09-11 | End: 2017-09-12

## 2017-09-11 RX ORDER — SODIUM CHLORIDE 0.9 % (FLUSH) 0.9 %
10 SYRINGE (ML) INJECTION PRN
Status: DISCONTINUED | OUTPATIENT
Start: 2017-09-11 | End: 2017-09-11 | Stop reason: ALTCHOICE

## 2017-09-11 RX ORDER — PROPOFOL 10 MG/ML
INJECTION, EMULSION INTRAVENOUS
Status: COMPLETED
Start: 2017-09-11 | End: 2017-09-11

## 2017-09-11 RX ORDER — INSULIN GLARGINE 100 [IU]/ML
25 INJECTION, SOLUTION SUBCUTANEOUS 2 TIMES DAILY
Status: DISCONTINUED | OUTPATIENT
Start: 2017-09-11 | End: 2017-09-12

## 2017-09-11 RX ORDER — FENTANYL CITRATE 50 UG/ML
INJECTION, SOLUTION INTRAMUSCULAR; INTRAVENOUS
Status: COMPLETED
Start: 2017-09-11 | End: 2017-09-11

## 2017-09-11 RX ORDER — SODIUM CHLORIDE 0.9 % (FLUSH) 0.9 %
10 SYRINGE (ML) INJECTION EVERY 12 HOURS SCHEDULED
Status: DISCONTINUED | OUTPATIENT
Start: 2017-09-11 | End: 2017-09-11 | Stop reason: SDUPTHER

## 2017-09-11 RX ORDER — BUMETANIDE 0.25 MG/ML
1 INJECTION, SOLUTION INTRAMUSCULAR; INTRAVENOUS ONCE
Status: COMPLETED | OUTPATIENT
Start: 2017-09-11 | End: 2017-09-11

## 2017-09-11 RX ORDER — PROPOFOL 10 MG/ML
10 INJECTION, EMULSION INTRAVENOUS
Status: DISCONTINUED | OUTPATIENT
Start: 2017-09-11 | End: 2017-09-11

## 2017-09-11 RX ADMIN — FENTANYL CITRATE 75 MCG/HR: 50 INJECTION, SOLUTION INTRAMUSCULAR; INTRAVENOUS at 08:28

## 2017-09-11 RX ADMIN — IPRATROPIUM BROMIDE AND ALBUTEROL SULFATE 1 AMPULE: .5; 3 SOLUTION RESPIRATORY (INHALATION) at 12:53

## 2017-09-11 RX ADMIN — PROPOFOL 40 MCG/KG/MIN: 10 INJECTION, EMULSION INTRAVENOUS at 02:57

## 2017-09-11 RX ADMIN — FUROSEMIDE 40 MG: 10 INJECTION, SOLUTION INTRAMUSCULAR; INTRAVENOUS at 02:01

## 2017-09-11 RX ADMIN — PIPERACILLIN SODIUM,TAZOBACTAM SODIUM 3.38 G: 3; .375 INJECTION, POWDER, FOR SOLUTION INTRAVENOUS at 23:40

## 2017-09-11 RX ADMIN — PROPOFOL 30 MCG/KG/MIN: 10 INJECTION, EMULSION INTRAVENOUS at 07:44

## 2017-09-11 RX ADMIN — VANCOMYCIN HYDROCHLORIDE 1000 MG: 1 INJECTION, POWDER, LYOPHILIZED, FOR SOLUTION INTRAVENOUS at 12:45

## 2017-09-11 RX ADMIN — INSULIN GLARGINE 35 UNITS: 100 INJECTION, SOLUTION SUBCUTANEOUS at 08:26

## 2017-09-11 RX ADMIN — ACETYLCYSTEINE 600 MG: 200 SOLUTION ORAL; RESPIRATORY (INHALATION) at 11:23

## 2017-09-11 RX ADMIN — PROPOFOL 50 MG: 10 INJECTION, EMULSION INTRAVENOUS at 01:58

## 2017-09-11 RX ADMIN — PIPERACILLIN SODIUM,TAZOBACTAM SODIUM 3.38 G: 3; .375 INJECTION, POWDER, FOR SOLUTION INTRAVENOUS at 07:47

## 2017-09-11 RX ADMIN — INSULIN LISPRO 3 UNITS: 100 INJECTION, SOLUTION INTRAVENOUS; SUBCUTANEOUS at 12:08

## 2017-09-11 RX ADMIN — LEVOTHYROXINE SODIUM 25 MCG: 25 TABLET ORAL at 09:27

## 2017-09-11 RX ADMIN — FENTANYL CITRATE 75 MCG/HR: 50 INJECTION, SOLUTION INTRAMUSCULAR; INTRAVENOUS at 20:12

## 2017-09-11 RX ADMIN — Medication 10 ML: at 20:14

## 2017-09-11 RX ADMIN — PIPERACILLIN SODIUM,TAZOBACTAM SODIUM 3.38 G: 3; .375 INJECTION, POWDER, FOR SOLUTION INTRAVENOUS at 17:07

## 2017-09-11 RX ADMIN — CARVEDILOL 12.5 MG: 6.25 TABLET, FILM COATED ORAL at 09:27

## 2017-09-11 RX ADMIN — ASPIRIN 81 MG: 81 TABLET, CHEWABLE ORAL at 09:27

## 2017-09-11 RX ADMIN — FENTANYL CITRATE 25 MCG/HR: 50 INJECTION, SOLUTION INTRAMUSCULAR; INTRAVENOUS at 03:36

## 2017-09-11 RX ADMIN — FUROSEMIDE 40 MG: 10 INJECTION, SOLUTION INTRAMUSCULAR; INTRAVENOUS at 09:28

## 2017-09-11 RX ADMIN — ETOMIDATE 30 MG: 2 INJECTION, SOLUTION INTRAVENOUS at 01:40

## 2017-09-11 RX ADMIN — IPRATROPIUM BROMIDE AND ALBUTEROL SULFATE 1 AMPULE: .5; 3 SOLUTION RESPIRATORY (INHALATION) at 19:49

## 2017-09-11 RX ADMIN — Medication 13 UNITS: at 06:53

## 2017-09-11 RX ADMIN — PRAVASTATIN SODIUM 80 MG: 80 TABLET ORAL at 20:37

## 2017-09-11 RX ADMIN — BUMETANIDE 1 MG: 0.25 INJECTION, SOLUTION INTRAMUSCULAR; INTRAVENOUS at 20:10

## 2017-09-11 RX ADMIN — IPRATROPIUM BROMIDE AND ALBUTEROL SULFATE 1 AMPULE: .5; 3 SOLUTION RESPIRATORY (INHALATION) at 23:34

## 2017-09-11 RX ADMIN — IPRATROPIUM BROMIDE AND ALBUTEROL SULFATE 1 AMPULE: .5; 3 SOLUTION RESPIRATORY (INHALATION) at 16:06

## 2017-09-11 RX ADMIN — FENTANYL CITRATE 100 MCG: 50 INJECTION INTRAMUSCULAR; INTRAVENOUS at 03:12

## 2017-09-11 RX ADMIN — Medication 10 ML: at 09:29

## 2017-09-11 ASSESSMENT — PAIN SCALES - GENERAL
PAINLEVEL_OUTOF10: 3
PAINLEVEL_OUTOF10: 0
PAINLEVEL_OUTOF10: 0
PAINLEVEL_OUTOF10: 8
PAINLEVEL_OUTOF10: 0
PAINLEVEL_OUTOF10: 0

## 2017-09-11 ASSESSMENT — PULMONARY FUNCTION TESTS
PIF_VALUE: 31
PIF_VALUE: 50
PIF_VALUE: 26
PIF_VALUE: 29
PIF_VALUE: 28
PIF_VALUE: 22
PIF_VALUE: 34

## 2017-09-11 NOTE — PLAN OF CARE
Problem: Pain:  Goal: Pain level will decrease  Pain level will decrease   Outcome: Ongoing  Patient started on a fentanyl gtt to control pain. Goal: Control of acute pain  Control of acute pain   Outcome: Ongoing  Patient started on a fentanyl gtt to control pain    Problem: Cardiovascular  Goal: No DVT, peripheral vascular complications  Outcome: Ongoing  Patient is currently on a heparin gtt. Goal: Hemodynamic stability  Outcome: Ongoing  Patient is not currently on pressors. Problem: Respiratory  Goal: O2 Sat > 90%  Outcome: Ongoing  Patient is currently on the ventilator and is maintaining his O2 sats >92%. Problem: Skin Integrity/Risk  Goal: No skin breakdown during hospitalization  Outcome: Met This Shift  No new skin issues at this time. Frequent skin checks complete. As well as every 2 hr turns and prn turns. Problem: Musculor/Skeletal Functional Status  Goal: Absence of falls  Outcome: Met This Shift  Patient is free of falls at this time. Problem: Discharge Planning:  Goal: Discharged to appropriate level of care  Discharged to appropriate level of care   Outcome: Not Met This Shift  Patient was transferred to CCU overnight. Problem: Restraint Use - Nonviolent/Non-Self-Destructive Behavior:  Goal: Absence of restraint indications  Absence of restraint indications   Outcome: Not Met This Shift  Patient is currently requiring restraints at this time due to attempting to pull out lines and tubes. Goal: Absence of restraint-related injury  Absence of restraint-related injury   Outcome: Not Met This Shift  Patient is in restraints at this time due to attempting to pull out lines and tubes. Will d/c restraints when clinically justified. Comments:   Care plan reviewed with patient and family. Patient and family verbalize understanding of the plan of care and contribute to goal setting.

## 2017-09-11 NOTE — PROGRESS NOTES
Patient was called on rapid response after his oxygen saturation dropped to 74 requiring face mask on 15L without improvement beyond 85%. At bedside, patient was leaning forward, hand closed by knees and not interacting. He was very diaphoretic and hardly responding. Telemetry showed sinus tachycardia. Glucose check showed 270. He was laid back on bed and became very dyspneic. No JVD was noted. Intubation was recommended    RSI-  - given 15 mg of etomidate with successful sedation. - three attempts to intubate with 7.5 tubes was unsuccessful and anesthesia was called in. They successfuly intubated on one attempt. Labs:  - glucose- 270  - Mag, BMP and BNP pending    ECG-  -elevated ST in lateral lead-- Roni called and reviewed them from home.   - Resolved they are residual from prior MI and that patient need to be observed on heparin and nitro as was before    CXR- Mild pulmonary edema and ET tube well positioned. Plan:  - sedation and vent support. Consult pulmonary for vent management. Repeat ABG in 1 hr to adjust vent  - fu on troponin cycling and watch monitor closely  -lasix 40 IV, BMP and magnesium. Monitor glucose for possible hypoglycemia. Replace electrolytes as needed  - Dr. Tati Henriquez for cardiac cath in am. Already was scheduled  -called family without success  -Monitor for possible aspiration pneumonia.

## 2017-09-11 NOTE — PLAN OF CARE
Problem: RESPIRATORY  Goal: Lung sounds clear or within normal limits for patient  Outcome: Ongoing  Will continue treatments as ordered. Will continue to suction as needed to help clear airways of secretions.

## 2017-09-11 NOTE — CODE DOCUMENTATION
0130 - Dr. Nohemi Scott to room / patient on non-rebreather  /146   0137 - Glucose 270 / respiratory to room / troponins, Mag and BMP ordered  0138 Kelvin RCP bag patient  0140 - Etomidate 30 mg given by Annalee Sweeney - Dr. Nohemi Scott first attempt at intubation / Pulse 137 /130  O2 95%  0142- IV unsecessful by Catia PUENTE  0143 - O2 88% / lab to room  0144 - Kelvin RCT bagging patient again, intubation unsucessful  0147 - attempt at intubation, unsucessful - in esophagus  0148 - IV Catia PUENTE / Susa Lanes NP to room  0149 - Catia RCP attempt intubation - unsucessful / 20 Mcg Nitro increase by Catia PUENTE, /132 Pulse 134, O2 96%  0150 - Dr. Tamera Flor to room - intubation sucessful.  ET tube size 7.5 at 23 at lips  0153 - /129  Pulse 135  0154 - Catia PUENTE attempting NG tube - unsucessful / /129  0155 - Chest X-ray done  0158 - Pt was pulling at ET tube - 50 Mg diprovan ordered and given by Catia PUENTE  0200 - OG tube placed by Catia PUENTE / 40 mg Lasix ordered, report called to CCU by Alexis Willis RN  0201 - Lasix given by Geraldo Helton  1081 - EKG done and given to Dr. Cindi Bone - 2nd EKG done and given to Dr. Cindi Bone - Pulse 111, BP 96/76, MAP 83, Nitro decreased to 5mcg/min by Catia PUENTE  0210 - pt transferred to CCU

## 2017-09-11 NOTE — PROGRESS NOTES
Patient admitted to CCU overnight, incontinent of urine and stool upon admission. Patient bathed and attached to CCU telemetry monitor. Assessment completed. Dr. Leandro Fountain at bedside and updated on plan of care. Patient actively attempting to pull out lines and tubes. Fentanyl gtt and propofol gtts started. Dr. Mello Romero updated via telephone. Dr. Leandro Fountain updated on morning labs and ABGs. Dr. Melanie Julien consulted for pulmonary management, see orders for new vent setting changes. Family at bedside and updated on plan of care.

## 2017-09-11 NOTE — PROGRESS NOTES
[facility]   Nephrology progress note  9/11/2017, 2:29 PM      Pt Name:    Darcella Osgood  MRN:     [id number]     [place]:    1950  Admit Date:    9/7/2017 11:53 AM  Primary Care Physician:  Lula Conroy MD   Room number  3A-12/012-A    Chief Complaint: Nephrology following for HARDY/CKD    Subjective:  Patient seen and examined earlier during rounds  Intubated and sedated  Unable to obtain any info from patient  Family at bedside  Pt on 40% FIO2  Pt on Iv heparin  Iv Propofol drips  Also on nitro  On IV lasix BID    Objective:  24HR INTAKE/OUTPUT:    Intake/Output Summary (Last 24 hours) at 09/11/17 1429  Last data filed at 09/11/17 9989   Gross per 24 hour   Intake          2743.88 ml   Output             1450 ml   Net          1293.88 ml     I/O last 3 completed shifts: In: 3483.9 [P.O.:940; I.V.:2543.9]  Out: 8967 [Urine:2675]     Admission weight: 174 lb 2.6 oz (79 kg)  Wt Readings from Last 3 Encounters:   09/11/17 196 lb 3.4 oz (89 kg)   08/09/17 177 lb 3.2 oz (80.4 kg)   07/20/17 195 lb 12.8 oz (88.8 kg)     Body mass index is 28.98 kg/(m^2). Physical examination  VITALS:   Systolic low 90S.    Vitals:    09/11/17 1257   BP:    Pulse: 84   Resp: 18   Temp:    SpO2: 100%     General Appearance: +intubated and sedated  Mouth/Throat: +ET tube in place  Neck: No JVD  Lungs: Air entry B/L, decreased at bases  Heart:  S1, S2 heard  GI: soft, no distention, no abd wall edema  Extremities: no LE edema  No pre-sacral or thigh edema      Lab Data  CBC:   Recent Labs      09/09/17   0353  09/11/17   0528   WBC  12.2*  10.1   HGB  10.6*  11.7*   HCT  32.4*  35.6*   PLT  340  376     BMP:  Recent Labs      09/10/17   0344  09/11/17   0147  09/11/17   0528   NA  142  141  141   K  4.4  4.5  5.1   CL  105  104  104   CO2  25  21*  29   BUN  34*  22  23*   CREATININE  1.5*  1.1  1.5*   GLUCOSE  265*  322*  340*   CALCIUM  8.5  8.9  8.4*   MG   --   2.2   --      Hepatic: Recent Labs

## 2017-09-11 NOTE — PROGRESS NOTES
Spoke with Dr. Mary Giron regarding AM labs, he said to not make any vent setting changes at this time.

## 2017-09-11 NOTE — CONSULTS
[facility]    Patient - Christianne Mcdaniels   MRN -  [id number]   [facility]   - 1950      Date of Admission -  2017 11:53 AM  Date of evaluation -  2017  [id number]/[id number]   Consulting - Barbara Oro MD  Primary Care Physician - Claudia Manriquez, [address] Day - 4  Chief Complaint   Hypercapnic respiratory failure/pulmonary edema/aspiration pneumonia/ventilator management  Active Hospital Problem List      Active Hospital Problems    Diagnosis Date Noted    ALANA (obstructive sleep apnea) [G47.33] 2014     Priority: High    Acute respiratory failure with hypoxia and hypercarbia (HCC) [J96.01, J96.02] 2017    Hyperkalemia [E87.5]     Leukocytosis, unspecified [D72.829]     Normocytic anemia [D64.9]     Elevated lipase [R74.8]     Chest pain [R07.9]     Diabetes mellitus due to underlying condition with stage 3 chronic kidney disease, with long-term current use of insulin (HCC) [E08.22, N18.3, Z79.4]     Hyperglycemia [R73.9]     CKD (chronic kidney disease), stage III [N18.3]     Acute combined systolic and diastolic congestive heart failure (HCC) [I50.41]     Anemia of chronic kidney failure [N18.9, D63.1]     Type 2 diabetes mellitus with hyperglycemia, with long-term current use of insulin (HCC) [E11.65, Z79.4] 2016    Shortness of breath [R06.02] 2016    Essential hypertension [I10] 2016    Chronic obstructive pulmonary disease ([facility] [J44.9]     Mediastinal lymphadenopathy [R59.0]     Lung nodule [R91.1] 2012     HPI   The patient is a 69 y.o. male with significant past medical history of severe COPD and ALANA compliant with cpap, stable lung nodule, mediastinal lymphadenopathy (followed by Dr. Lina Olivo), CAD with chronic systolic/diastolic HF, DM, HTN, CKD and remote nicotine dependence who presented to the hospital on 17 with chest pain.  The plan was to proceed with [facility] today, however he rapidly decompensated over night with pulmonary edema/NSTEMI resulting in hypercapnic/hypoxic respiratory failure requiring intubation. Follow up ABG revealed pH 7.24 PCO2 63 HC03 247 P02 450 on AC 14, , PEEP 5. Rate was increased to 18 and VT to 550 with repeat pH 7.39 and C02 41 P02 81 with PEEP 5 and 40% FI02. CXR showed pulmonary edema with bilateral pulmonary infiltrates. WBC climbed to 12.2 and is now febrile with TM of 102.7. Although likely discrepancy in scales, his weight is up 22 lbs and nearly 5 Liters ahead on fluid. We are asked to evaluate for critical care/ventilator management. At the time of my evaluation he is sedate on the above vent settings. Procalcitonin is 0.28. He has not responded to IV diuresis, he remains afebrile and SBP 80's.   Past Medical History         Diagnosis Date    Gonsalves's esophagus     CAD (coronary artery disease)     s/p stent placement    Chronic systolic heart failure (HCC)     CKD (chronic kidney disease)     COPD (chronic obstructive pulmonary disease) (HCC)     Hyperlipidemia     Hypertension     Hypothyroid     Lung nodule     Lymphadenopathy     Osteoarthritis     Sleep apnea     Tobacco abuse     Type II or unspecified type diabetes mellitus without mention of complication, not stated as uncontrolled       Past Surgical History           Procedure Laterality Date    CARPAL TUNNEL RELEASE      CATARACT REMOVAL Bilateral 2012, 2016    COLONOSCOPY  2006,2010,2016    CORONARY ANGIOPLASTY WITH STENT PLACEMENT      last one 2004    ELBOW SURGERY      x 2    INGUINAL HERNIA REPAIR Bilateral     KNEE SURGERY Left     MT EGD TRANSORAL BIOPSY SINGLE/MULTIPLE Left 7/20/2017    EGD BIOPSY performed by Angelika Young MD at [address] [place] [address]      bilateral    UPPER GASTROINTESTINAL ENDOSCOPY  2006,2007,2010,2012, 2016     Allergies   Amlodipine  Social History     Social History     Social History    Marital status:      Spouse name: July Silver Number of children: 2    Years of education: 10     Occupational History    Not on file.      Social History Main Topics    Smoking status: Former Smoker     Packs/day: 0.50     Years: 40.00     Types: Cigarettes     Quit date: 3/9/2000    Smokeless tobacco: Never Used    Alcohol use No    Drug use: No    Sexual activity: Yes     Other Topics Concern    Not on file     Social History Narrative     Family History         Problem Relation Age of Onset    Heart Disease Father     Cancer Brother 52     type unknown    Colon Cancer Neg Hx     Inflam Bowel Dis Neg Hx      Sleep History   ALANA with cpap of 13  ROS    Unable to obtain  Meds      furosemide  40 mg Intravenous BID    piperacillin-tazobactam  3.375 g Intravenous Q8H    ipratropium-albuterol  1 ampule Inhalation Q4H    insulin glargine  20 Units Subcutaneous BID    insulin lispro  0-18 Units Subcutaneous TID WC    insulin lispro  0-9 Units Subcutaneous Nightly    lidocaine PF  5 mL Intradermal Once    vancomycin  1,000 mg Intravenous Once    phosphorus replacement protocol   Other RX Placeholder    calcium replacement protocol   Other RX Placeholder    acetylcysteine  1,200 mg Oral BID    [START ON 9/12/2017] vancomycin  1,500 mg Intravenous Q36H    carvedilol  12.5 mg Oral BID WC    hydrALAZINE  10 mg Oral TID    levothyroxine  25 mcg Oral Daily    pravastatin  80 mg Oral Daily    sodium chloride flush  10 mL Intravenous 2 times per day    aspirin  81 mg Oral Daily     PRN  dextrose, sodium chloride flush, acetaminophen, magnesium hydroxide, ondansetron, potassium chloride **OR** potassium chloride **OR** potassium chloride, magnesium sulfate, nitroGLYCERIN, glucose, glucagon (rDNA), dextrose  IV   propofol 30 mcg/kg/min (09/11/17 0744)    fentaNYL (SUBLIMAZE) 500 mcg in sodium chloride 0.9 % 100 mL 75 mcg/hr (09/11/17 0828)    dextrose      heparin (porcine) 15 Units/kg/hr (09/11/17 2012)    nitroGLYCERIN 5 mcg/min (09/08/17 1003)     Home  Prescriptions Prior to Admission: Insulin Disposable Pump (V-GO 30) KIT, Humalog insulin  2 units/click Pt uses 1-2 clicks prior to breakfast 6 clicks prior to lunch 6 clicks prior to evening meal  1-2 clicks with a snack  losartan (COZAAR) 50 MG tablet, Take 50 mg by mouth daily  CINNAMON PO, Take 2 capsules by mouth daily  Ascorbic Acid (VITAMIN C) 500 MG tablet, Take 500 mg by mouth daily  zinc gluconate 50 MG tablet, Take 50 mg by mouth daily  B Complex-Folic Acid (B COMPLEX PLUS PO), Take 1 tablet by mouth daily  carvedilol (COREG) 12.5 MG tablet, Take 12.5 mg by mouth 2 times daily (with meals)  bumetanide (BUMEX) 1 MG tablet, Take 1 mg by mouth 2 times daily   spironolactone (ALDACTONE) 25 MG tablet, Take 25 mg by mouth daily  levothyroxine (SYNTHROID) 25 MCG tablet, Take 25 mcg by mouth daily  albuterol sulfate  (90 BASE) MCG/ACT inhaler, Inhale 2 puffs into the lungs every 6 hours as needed for Wheezing  SPIRIVA HANDIHALER 18 MCG inhalation capsule, INHALE 1 CAPSULE INTO THE LUNGS DAILY  Exenatide (BYDUREON) 2 MG PEN, Inject 2 mg into the skin once a week Takes on Sunday   acetaminophen (TYLENOL) 325 MG tablet, Take 650 mg by mouth every 6 hours as needed for Pain  pravastatin (PRAVACHOL) 80 MG tablet, Take 1 tablet by mouth daily  omeprazole (PRILOSEC) 20 MG capsule, Take 1 capsule by mouth every morning (before breakfast)  Multiple Vitamin (MULTI VITAMIN MENS PO), Take 1 tablet by mouth daily   hydrALAZINE (APRESOLINE) 10 MG tablet, Take 10 mg by mouth 3 times daily. aspirin 81 MG EC tablet, Take 81 mg by mouth daily.     [DISCONTINUED] furosemide (LASIX) 40 MG tablet, Take 40 mg by mouth daily   [DISCONTINUED] hydrochlorothiazide (HYDRODIURIL) 25 MG tablet, Take 25 mg by mouth daily  [DISCONTINUED] metoprolol succinate (TOPROL XL) 50 MG extended release tablet, Take 50 mg by mouth daily  glucose monitoring kit (FREESTYLE) monitoring kit, Results   Component Value Date    PH 7.39 2017    PH 7.44 2011    PCO2 41 2017    PCO2 74 2011    PO2 81 2017    PO2 70 2011    HCO3 25 2017    O2SAT 96 2017     Lab Results   Component Value Date    IFIO2 40 2017    MODE AC 2017    SETTIDVOL 550 2017    SETPEEP 5.0 2017     Vitals    height is 5' 9\" (1.753 m) and weight is 196 lb 3.4 oz (89 kg). His core temperature is 102.7 °F (39.3 °C). His blood pressure is 85/60 and his pulse is 84. His respiration is 18 and oxygen saturation is 100%.      O2 Flow Rate (L/min): 40 L/min  Patient Vitals for the past 8 hrs:   BP Temp Temp src Pulse Resp SpO2   17 1257 - - - 84 18 100 %   17 1255 - - - - 18 100 %   17 1253 - - - - - 100 %   17 1135 85/60 - - 85 - 100 %   17 1101 97/67 102.7 °F (39.3 °C) CORE 84 18 100 %   17 1055 90/61 - - 84 18 100 %   17 1040 (!) 79/58 - - 84 18 100 %   17 1030 (!) 62/41 - - 85 18 100 %   17 1002 (!) 90/58 - - 87 17 100 %   17 0931 94/61 - - 88 18 99 %   17 0921 - 102.4 °F (39.1 °C) CORE - - -   17 0902 97/63 - - 89 (!) 0 100 %   17 0852 - - - 90 18 100 %   17 0832 111/78 - - 97 21 97 %   17 0802 99/68 - - 96 20 100 %   17 0743 - 102 °F (38.9 °C) CORE 97 18 100 %   17 0700 (!) 143/86 - - 109 12 100 %   17 0637 (!) 151/94 - - 108 21 100 %   17 0600 (!) 151/94 100.8 °F (38.2 °C) Oral 95 27 100 %   17 0558 - - - 92 19 100 %     Range Data  Temperature Range:Temp: 102.7 °F (39.3 °C)Temp  Av.1 °F (37.8 °C)  Min: 98 °F (36.7 °C)  Max: 102.7 °F (39.3 °C)  BP Range:Systolic (81MGA), WDC:390 , Min:62 , QKE:447   Diastolic (01ZJJ), GHM:74, Min:41, Max:146    Pulse Range: Pulse  Av.6  Min: 75  Max: 137  Respiration Range:Resp  Av.2  Min: 0  Max: 75  Current Pulse Ox:  SpO2: 100 %  24HR Pulse Ox Range:SpO2  Av.7 %  Min: 68 %  Max: 100 %  I/O Intake/Output Summary (Last 24 hours) at 09/11/17 1340  Last data filed at 09/11/17 0642   Gross per 24 hour   Intake          2743.88 ml   Output             1450 ml   Net          1293.88 ml     I/O last 3 completed shifts: In: 3483.9 [P.O.:940; I.V.:2543.9]  Out: 9698 [Urine:2675]     Date 09/11/17 0000 - 09/11/17 2359   Shift 2602-1777 8975-7668 9444-8463 24 Hour Total   I  N  T  A  K  E   I.V.  (mL/kg/hr) 821  (1.2)   821    Shift Total  (mL/kg) 821  (9.2)   821  (9.2)   O  U  T  P  U  T   Urine  (mL/kg/hr) 600  (0.8)   600    Shift Total  (mL/kg) 600  (6.7)   600  (6.7)   Weight (kg) 89 89 89 89     Patient Vitals for the past 96 hrs (Last 3 readings):   Weight   09/11/17 0255 196 lb 3.4 oz (89 kg)   09/10/17 0503 189 lb 4.8 oz (85.9 kg)   09/09/17 0427 184 lb 8 oz (83.7 kg)     Lines, ET tube, Devices, Drains   ETT  OG  Jordan  Exam   General Appearance  Sedated on Vent. HEENT - Head is normocephalic, atraumatic, PERRL, OG/ ETT noted  Neck - Soft, trachea midline and straight,   Lungs - respirations even and unlabored with vent support, Lungs with bilateral rales and wheezing. Cardiovascular - Heart sounds are normal.  Regular rhythm, normal rate without murmur, gallop or rub. NSR. Abdomen - Soft, nondistended, no masses or organomegaly. Positive BS. Neurologic - sedated  Skin - No bruising or bleeding  Extremities - Warm and dry.  No cyanosis, clubbing or edema  Vascular - Skin turgor normal, capillary fill normal  Labs   ABG  Lab Results   Component Value Date    PH 7.39 09/11/2017    PO2 81 09/11/2017    PCO2 41 09/11/2017    HCO3 25 09/11/2017    O2SAT 96 09/11/2017     Lab Results   Component Value Date    IFIO2 40 09/11/2017    MODE AC 09/11/2017    SETTIDVOL 550 09/11/2017    SETPEEP 5.0 09/11/2017     CBC  Recent Labs      09/09/17   0353  09/11/17   0528   WBC  12.2*  10.1   RBC  3.69*  4.05*   HGB  10.6*  11.7*   HCT  32.4*  35.6*   MCV  87.8  88.1   MCH  28.7  29.0   MCHC  32.7*  32.9* start  Peptic ulcer disease prophylaxis with PPPI  Renal   CKD, stage III  Hyperkalemia  Nephrology following  Carefully monitor input and output  Supplement all electrolytes per ICU electrolytes replacement protocols  Infectious Disease   Sepsis  Aspiration pneumonia vs HCAP  Fever  Leukocytosis  Pan culture and follow  Zosyn/Vanco  PICC  Fluids  Pressor support  Daily CXR  Hold all anti-hypertensives  Hematology/Oncology   Anemia likely of chronic disease  Stable H/H  DVT prophylaxis: Heparin drip  Endocrine   Type II DM, uncontrolled  Hypothyroid on synthroid  Increase Lantus and continue HD SSI coverage  Check A1c  Check TSH  Social/Spiritual/DNR/Miscellaneous   Bedrest  Condition critical  Vitals signs monitoring per protocol  Full code    Thank you for the consult and allowing us to participate in the care of your patient. Case discussed with nurse and patient/family. Questions and concerns addressed. Meds and Orders reviewed. Electronically signed by     Ryan Landeros CNP on 9/11/2017 at 1:40 PM    Addendum by Dr. Ethan Henley MD:  I have seen and examined the patient independently. Face to face evaluation and examination was performed. The above evaluation and note has been reviewed. Labs and radiographs were reviewed. I Have discussed with Ms. Velasquez Castro. MEERA Abdi about this patient in detail. D/w Patient's R.N. and specific instructions given. Patient issues addressed. The above assessment and plan has been reviewed. Please see my modifications mentioned below. My modifications:  S  : He remain on the vent but easily arousable  O  : Good breath sounds on both sides. Bilateral basal rales. Occasional expiratory wheezing. A  :  Acute hypercapnic respiratory failure due to pulmonary edema from CHF. Left side pneumonia due to CAP Vs aspiration Vs HCAP. P  : Continue patient on the vent. Condition critical and prognosis guarded.   Discussed with Mr. Saqib Georges RN taking care of patient

## 2017-09-11 NOTE — PROGRESS NOTES
Kcal: ~2075kcals (25kcals/kgm admit wt. 83kgm)  · Estimated Daily Protein (g): 100+ grams/kgm m( 1.2+ admit wt. 83kgm as kidney function tolerates)  · Estimated Daily Fluid (ml/day): per MD    Estimated Intake vs Estimated Needs: Intake Less Than Needs    Nutrition Risk Level: High    Nutrition Interventions:   Start Tube Feeding  Continued Inpatient Monitoring, Education not appropriate at this time    Nutrition Evaluation:   · Evaluation: Goals set   · Goals: Pt. will meet % nutritional needs while on vent. · Monitoring: TF Intake, TF Tolerance, Gastric Residuals, Fluid Balance, Ascites/Edema, Weight, Comparative Standards, Pertinent Labs, Chewing/Swallowing    See Adult Nutrition Doc Flowsheet for more detail.      Electronically signed by Moises Augustin RD, LD on 9/11/17 at 12:10 PM    Contact Number: [phone]

## 2017-09-11 NOTE — PROGRESS NOTES
Pharmacy Note  Vancomycin Consult    Janeth Fernandez is a 69 y.o. male started on Vancomycin for suspected sepsis; consult received from Angélica Abdi/Vickey to manage therapy. Also receiving the following antibiotics: zosyn. Patient Active Problem List   Diagnosis    Lung nodule    ALANA (obstructive sleep apnea)    Obesity (BMI 30.0-34. 9)    Shortness of breath    Pleural effusion    [facility]    Type 2 diabetes mellitus with hyperglycemia, with long-term current use of insulin ([facility])    Essential hypertension    Chronic obstructive pulmonary disease ([facility])    Mediastinal lymphadenopathy    Proteinuria    Anemia of chronic kidney failure    Congestive heart failure (HCC)    Acute combined systolic and diastolic congestive heart failure ([facility])    CKD (chronic kidney disease), stage III    Hyperglycemia    Coronary artery disease involving native coronary artery of native heart without angina pectoris    Systolic dysfunction    Cardiomyopathy [facility]    Chronic combined systolic and diastolic CHF (congestive heart failure) ([facility])    Biliary dyskinesia    Calculus of gallbladder without cholecystitis without obstruction    Elevated lipase    Chest pain    Diabetes mellitus due to underlying condition with stage 3 chronic kidney disease, with long-term current use of insulin ([facility])    Hyperkalemia    Leukocytosis, unspecified    Normocytic anemia    Acute respiratory failure with hypoxia and hypercarbia ([facility])       Allergies:  Amlodipine     Temp max: 102.7    Recent Labs      09/11/17   0147  09/11/17   0528   BUN  22  23*       Recent Labs      09/11/17   0147  09/11/17   0528   CREATININE  1.1  1.5*       Recent Labs      09/09/17   0353  09/11/17   0528   WBC  12.2*  10.1         Intake/Output Summary (Last 24 hours) at 09/11/17 1155  Last data filed at 09/11/17 0642   Gross per 24 hour   Intake 2743.88 ml   Output 2100 ml   Net 643.88 ml Cultures/Sensitivites  Date Source Result   9/11/2017 BC1 ngtd   9/11/2017 BC2 ngtd       Ht Readings from Last 1 Encounters:   09/08/17 5' 9\" (1.753 m)        Wt Readings from Last 1 Encounters:   09/11/17 196 lb 3.4 oz (89 kg)         Body mass index is 28.98 kg/(m^2). Estimated Creatinine Clearance: 51 mL/min (based on Cr of 1.5). Assessment/Plan:   Vancomycin 1000mg x 1 per Arch Kenzie   Will initiate vancomycin 1500 mg IV every 36 hours.  Timing of trough level will be determined based on culture results, renal function, and clinical response.  Patient has had HARDY in 2016 monitor  closely. Thank you for the consult. Will continue to follow.

## 2017-09-12 ENCOUNTER — APPOINTMENT (OUTPATIENT)
Dept: ULTRASOUND IMAGING | Age: 67
DRG: 208 | End: 2017-09-12
Payer: MEDICARE

## 2017-09-12 ENCOUNTER — APPOINTMENT (OUTPATIENT)
Dept: GENERAL RADIOLOGY | Age: 67
DRG: 208 | End: 2017-09-12
Payer: MEDICARE

## 2017-09-12 LAB
ALLEN TEST: POSITIVE
ALLEN TEST: POSITIVE
ANION GAP SERPL CALCULATED.3IONS-SCNC: 16 MEQ/L (ref 8–16)
APTT: 57.3 SECONDS (ref 22–38)
APTT: 88.5 SECONDS (ref 22–38)
AVERAGE GLUCOSE: 216 MG/DL (ref 70–126)
BASE EXCESS (CALCULATED): 0.5 MMOL/L (ref -2.5–2.5)
BASE EXCESS (CALCULATED): 0.9 MMOL/L (ref -2.5–2.5)
BUN BLDV-MCNC: 27 MG/DL (ref 7–22)
CALCIUM IONIZED: 1.12 MMOL/L (ref 1.12–1.32)
CALCIUM SERPL-MCNC: 8.2 MG/DL (ref 8.5–10.5)
CHLORIDE BLD-SCNC: 104 MEQ/L (ref 98–111)
CO2: 24 MEQ/L (ref 23–33)
COLLECTED BY:: ABNORMAL
COLLECTED BY:: NORMAL
CREAT SERPL-MCNC: 1.8 MG/DL (ref 0.4–1.2)
DEVICE: ABNORMAL
DEVICE: NORMAL
GFR SERPL CREATININE-BSD FRML MDRD: 38 ML/MIN/1.73M2
GLUCOSE BLD-MCNC: 108 MG/DL (ref 70–108)
GLUCOSE BLD-MCNC: 138 MG/DL (ref 70–108)
GLUCOSE BLD-MCNC: 180 MG/DL (ref 70–108)
GLUCOSE BLD-MCNC: 203 MG/DL (ref 70–108)
HBA1C MFR BLD: 9.2 % (ref 4.4–6.4)
HCO3: 25 MMOL/L (ref 23–28)
HCO3: 27 MMOL/L (ref 23–28)
HCT VFR BLD CALC: 28 % (ref 42–52)
HEMOGLOBIN: 9.4 GM/DL (ref 14–18)
IFIO2: 30
IFIO2: 30
LACTIC ACID: 1.5 MMOL/L (ref 0.5–2.2)
MAGNESIUM: 2.1 MG/DL (ref 1.6–2.4)
MCH RBC QN AUTO: 28.8 PG (ref 27–31)
MCHC RBC AUTO-ENTMCNC: 33.6 GM/DL (ref 33–37)
MCV RBC AUTO: 85.6 FL (ref 80–94)
MODE: AC
MODE: AC
O2 SATURATION: 95 %
O2 SATURATION: 96 %
PCO2: 36 MMHG (ref 35–45)
PCO2: 49 MMHG (ref 35–45)
PDW BLD-RTO: 14.3 % (ref 11.5–14.5)
PH BLOOD GAS: 7.35 (ref 7.35–7.45)
PH BLOOD GAS: 7.44 (ref 7.35–7.45)
PHOSPHORUS: 3.1 MG/DL (ref 2.4–4.7)
PLATELET # BLD: 294 THOU/MM3 (ref 130–400)
PMV BLD AUTO: 8.1 MCM (ref 7.4–10.4)
PO2: 81 MMHG (ref 71–104)
PO2: 83 MMHG (ref 71–104)
POTASSIUM SERPL-SCNC: 3.6 MEQ/L (ref 3.5–5.2)
PRO-BNP: 6987 PG/ML (ref 0–900)
PROCALCITONIN: 0.86 NG/ML (ref 0.01–0.09)
RBC # BLD: 3.28 MILL/MM3 (ref 4.7–6.1)
SET PEEP: 5 MMHG
SET PEEP: 5 MMHG
SET PRESS SUPP: 8 CMH2O
SET RESPIRATORY RATE: 18 BPM
SET TIDAL VOLUME: 550 ML
SODIUM BLD-SCNC: 144 MEQ/L (ref 135–145)
SOURCE, BLOOD GAS: ABNORMAL
SOURCE, BLOOD GAS: NORMAL
TROPONIN T: < 0.01 NG/ML
TSH SERPL DL<=0.05 MIU/L-ACNC: 3.36 UIU/ML (ref 0.4–4.2)
WBC # BLD: 10.2 THOU/MM3 (ref 4.8–10.8)

## 2017-09-12 PROCEDURE — 6370000000 HC RX 637 (ALT 250 FOR IP): Performed by: INTERNAL MEDICINE

## 2017-09-12 PROCEDURE — C9113 INJ PANTOPRAZOLE SODIUM, VIA: HCPCS | Performed by: NURSE PRACTITIONER

## 2017-09-12 PROCEDURE — 84145 PROCALCITONIN (PCT): CPT

## 2017-09-12 PROCEDURE — 94003 VENT MGMT INPAT SUBQ DAY: CPT

## 2017-09-12 PROCEDURE — 76705 ECHO EXAM OF ABDOMEN: CPT

## 2017-09-12 PROCEDURE — 99232 SBSQ HOSP IP/OBS MODERATE 35: CPT | Performed by: INTERNAL MEDICINE

## 2017-09-12 PROCEDURE — 6370000000 HC RX 637 (ALT 250 FOR IP): Performed by: NURSE PRACTITIONER

## 2017-09-12 PROCEDURE — 83735 ASSAY OF MAGNESIUM: CPT

## 2017-09-12 PROCEDURE — 36415 COLL VENOUS BLD VENIPUNCTURE: CPT

## 2017-09-12 PROCEDURE — 6360000002 HC RX W HCPCS

## 2017-09-12 PROCEDURE — 84443 ASSAY THYROID STIM HORMONE: CPT

## 2017-09-12 PROCEDURE — 80048 BASIC METABOLIC PNL TOTAL CA: CPT

## 2017-09-12 PROCEDURE — 6360000002 HC RX W HCPCS: Performed by: ANESTHESIOLOGY

## 2017-09-12 PROCEDURE — 82330 ASSAY OF CALCIUM: CPT

## 2017-09-12 PROCEDURE — 83605 ASSAY OF LACTIC ACID: CPT

## 2017-09-12 PROCEDURE — 82948 REAGENT STRIP/BLOOD GLUCOSE: CPT

## 2017-09-12 PROCEDURE — 6360000002 HC RX W HCPCS: Performed by: INTERNAL MEDICINE

## 2017-09-12 PROCEDURE — 6370000000 HC RX 637 (ALT 250 FOR IP): Performed by: ANESTHESIOLOGY

## 2017-09-12 PROCEDURE — 2100000000 HC CCU R&B

## 2017-09-12 PROCEDURE — 2580000003 HC RX 258: Performed by: INTERNAL MEDICINE

## 2017-09-12 PROCEDURE — 83036 HEMOGLOBIN GLYCOSYLATED A1C: CPT

## 2017-09-12 PROCEDURE — 36600 WITHDRAWAL OF ARTERIAL BLOOD: CPT

## 2017-09-12 PROCEDURE — 6360000002 HC RX W HCPCS: Performed by: NURSE PRACTITIONER

## 2017-09-12 PROCEDURE — 82803 BLOOD GASES ANY COMBINATION: CPT

## 2017-09-12 PROCEDURE — 71010 XR CHEST PORTABLE: CPT

## 2017-09-12 PROCEDURE — 84484 ASSAY OF TROPONIN QUANT: CPT

## 2017-09-12 PROCEDURE — 94640 AIRWAY INHALATION TREATMENT: CPT

## 2017-09-12 PROCEDURE — 87040 BLOOD CULTURE FOR BACTERIA: CPT

## 2017-09-12 PROCEDURE — 83880 ASSAY OF NATRIURETIC PEPTIDE: CPT

## 2017-09-12 PROCEDURE — 87385 HISTOPLASMA CAPSUL AG IA: CPT

## 2017-09-12 PROCEDURE — 99233 SBSQ HOSP IP/OBS HIGH 50: CPT | Performed by: INTERNAL MEDICINE

## 2017-09-12 PROCEDURE — 2700000000 HC OXYGEN THERAPY PER DAY

## 2017-09-12 PROCEDURE — 85027 COMPLETE CBC AUTOMATED: CPT

## 2017-09-12 PROCEDURE — 2580000003 HC RX 258: Performed by: NURSE PRACTITIONER

## 2017-09-12 PROCEDURE — 2580000003 HC RX 258: Performed by: ANESTHESIOLOGY

## 2017-09-12 PROCEDURE — 84100 ASSAY OF PHOSPHORUS: CPT

## 2017-09-12 PROCEDURE — 36592 COLLECT BLOOD FROM PICC: CPT

## 2017-09-12 PROCEDURE — 85730 THROMBOPLASTIN TIME PARTIAL: CPT

## 2017-09-12 RX ORDER — INSULIN GLARGINE 100 [IU]/ML
30 INJECTION, SOLUTION SUBCUTANEOUS 2 TIMES DAILY
Status: DISCONTINUED | OUTPATIENT
Start: 2017-09-12 | End: 2017-09-17 | Stop reason: HOSPADM

## 2017-09-12 RX ORDER — ASCORBIC ACID 500 MG
500 TABLET ORAL DAILY
Status: DISCONTINUED | OUTPATIENT
Start: 2017-09-12 | End: 2017-09-17 | Stop reason: HOSPADM

## 2017-09-12 RX ORDER — HYDROCODONE BITARTRATE AND ACETAMINOPHEN 5; 325 MG/1; MG/1
1 TABLET ORAL EVERY 6 HOURS PRN
Status: DISCONTINUED | OUTPATIENT
Start: 2017-09-12 | End: 2017-09-12 | Stop reason: SDUPTHER

## 2017-09-12 RX ORDER — LOSARTAN POTASSIUM 50 MG/1
50 TABLET ORAL DAILY
Status: DISCONTINUED | OUTPATIENT
Start: 2017-09-12 | End: 2017-09-12

## 2017-09-12 RX ORDER — FOLIC ACID/VIT B COMPLEX AND C 5 MG
1 TABLET ORAL DAILY
Status: DISCONTINUED | OUTPATIENT
Start: 2017-09-12 | End: 2017-09-17 | Stop reason: HOSPADM

## 2017-09-12 RX ORDER — AMPICILLIN TRIHYDRATE 250 MG
2 CAPSULE ORAL DAILY
Status: DISCONTINUED | OUTPATIENT
Start: 2017-09-12 | End: 2017-09-12 | Stop reason: CLARIF

## 2017-09-12 RX ORDER — POTASSIUM CHLORIDE 7.45 MG/ML
10 INJECTION INTRAVENOUS
Status: DISCONTINUED | OUTPATIENT
Start: 2017-09-12 | End: 2017-09-12

## 2017-09-12 RX ORDER — PANTOPRAZOLE SODIUM 40 MG/1
40 TABLET, DELAYED RELEASE ORAL
Status: DISCONTINUED | OUTPATIENT
Start: 2017-09-13 | End: 2017-09-17 | Stop reason: HOSPADM

## 2017-09-12 RX ORDER — HYDRALAZINE HYDROCHLORIDE 10 MG/1
10 TABLET, FILM COATED ORAL 3 TIMES DAILY
Status: DISCONTINUED | OUTPATIENT
Start: 2017-09-12 | End: 2017-09-13

## 2017-09-12 RX ORDER — HYDROCODONE BITARTRATE AND ACETAMINOPHEN 5; 325 MG/1; MG/1
2 TABLET ORAL EVERY 6 HOURS PRN
Status: DISCONTINUED | OUTPATIENT
Start: 2017-09-12 | End: 2017-09-17 | Stop reason: HOSPADM

## 2017-09-12 RX ORDER — IPRATROPIUM BROMIDE AND ALBUTEROL SULFATE 2.5; .5 MG/3ML; MG/3ML
1 SOLUTION RESPIRATORY (INHALATION)
Status: DISCONTINUED | OUTPATIENT
Start: 2017-09-12 | End: 2017-09-17 | Stop reason: HOSPADM

## 2017-09-12 RX ORDER — HYDROCODONE BITARTRATE AND ACETAMINOPHEN 5; 325 MG/1; MG/1
1 TABLET ORAL EVERY 6 HOURS PRN
Status: DISCONTINUED | OUTPATIENT
Start: 2017-09-12 | End: 2017-09-17 | Stop reason: HOSPADM

## 2017-09-12 RX ORDER — CIPROFLOXACIN 2 MG/ML
400 INJECTION, SOLUTION INTRAVENOUS EVERY 12 HOURS
Status: DISCONTINUED | OUTPATIENT
Start: 2017-09-12 | End: 2017-09-13

## 2017-09-12 RX ORDER — LABETALOL HYDROCHLORIDE 5 MG/ML
10 INJECTION, SOLUTION INTRAVENOUS EVERY 4 HOURS PRN
Status: DISCONTINUED | OUTPATIENT
Start: 2017-09-12 | End: 2017-09-17 | Stop reason: HOSPADM

## 2017-09-12 RX ORDER — POTASSIUM CHLORIDE 7.45 MG/ML
INJECTION INTRAVENOUS
Status: COMPLETED
Start: 2017-09-12 | End: 2017-09-12

## 2017-09-12 RX ORDER — POTASSIUM CHLORIDE 20MEQ/15ML
20 LIQUID (ML) ORAL ONCE
Status: COMPLETED | OUTPATIENT
Start: 2017-09-12 | End: 2017-09-12

## 2017-09-12 RX ORDER — CARVEDILOL 6.25 MG/1
12.5 TABLET ORAL 2 TIMES DAILY WITH MEALS
Status: DISCONTINUED | OUTPATIENT
Start: 2017-09-12 | End: 2017-09-13

## 2017-09-12 RX ORDER — ZINC GLUCONATE 50 MG
50 TABLET ORAL DAILY
Status: DISCONTINUED | OUTPATIENT
Start: 2017-09-12 | End: 2017-09-12 | Stop reason: CLARIF

## 2017-09-12 RX ORDER — POTASSIUM CHLORIDE 7.45 MG/ML
40 INJECTION INTRAVENOUS ONCE
Status: DISCONTINUED | OUTPATIENT
Start: 2017-09-12 | End: 2017-09-12 | Stop reason: SDUPTHER

## 2017-09-12 RX ORDER — INSULIN GLARGINE 100 [IU]/ML
25 INJECTION, SOLUTION SUBCUTANEOUS DAILY
Status: DISCONTINUED | OUTPATIENT
Start: 2017-09-12 | End: 2017-09-12

## 2017-09-12 RX ADMIN — PIPERACILLIN SODIUM,TAZOBACTAM SODIUM 3.38 G: 3; .375 INJECTION, POWDER, FOR SOLUTION INTRAVENOUS at 23:31

## 2017-09-12 RX ADMIN — VANCOMYCIN HYDROCHLORIDE 1500 MG: 1 INJECTION, POWDER, LYOPHILIZED, FOR SOLUTION INTRAVENOUS at 13:08

## 2017-09-12 RX ADMIN — IPRATROPIUM BROMIDE AND ALBUTEROL SULFATE 1 AMPULE: .5; 3 SOLUTION RESPIRATORY (INHALATION) at 08:05

## 2017-09-12 RX ADMIN — LEVOTHYROXINE SODIUM 25 MCG: 25 TABLET ORAL at 06:07

## 2017-09-12 RX ADMIN — FENTANYL CITRATE 125 MCG/HR: 50 INJECTION, SOLUTION INTRAMUSCULAR; INTRAVENOUS at 04:25

## 2017-09-12 RX ADMIN — IPRATROPIUM BROMIDE AND ALBUTEROL SULFATE 1 AMPULE: .5; 3 SOLUTION RESPIRATORY (INHALATION) at 03:22

## 2017-09-12 RX ADMIN — PIPERACILLIN SODIUM,TAZOBACTAM SODIUM 3.38 G: 3; .375 INJECTION, POWDER, FOR SOLUTION INTRAVENOUS at 14:24

## 2017-09-12 RX ADMIN — PIPERACILLIN SODIUM,TAZOBACTAM SODIUM 3.38 G: 3; .375 INJECTION, POWDER, FOR SOLUTION INTRAVENOUS at 06:07

## 2017-09-12 RX ADMIN — HYDRALAZINE HYDROCHLORIDE 10 MG: 10 TABLET, FILM COATED ORAL at 14:24

## 2017-09-12 RX ADMIN — MAGNESIUM SULFATE HEPTAHYDRATE 1 G: 500 INJECTION, SOLUTION INTRAMUSCULAR; INTRAVENOUS at 03:40

## 2017-09-12 RX ADMIN — CARVEDILOL 12.5 MG: 6.25 TABLET, FILM COATED ORAL at 18:14

## 2017-09-12 RX ADMIN — HYDRALAZINE HYDROCHLORIDE 10 MG: 10 TABLET, FILM COATED ORAL at 09:41

## 2017-09-12 RX ADMIN — HYDRALAZINE HYDROCHLORIDE 10 MG: 10 TABLET, FILM COATED ORAL at 20:04

## 2017-09-12 RX ADMIN — INSULIN GLARGINE 25 UNITS: 100 INJECTION, SOLUTION SUBCUTANEOUS at 08:07

## 2017-09-12 RX ADMIN — CIPROFLOXACIN 400 MG: 2 INJECTION, SOLUTION INTRAVENOUS at 18:23

## 2017-09-12 RX ADMIN — IPRATROPIUM BROMIDE AND ALBUTEROL SULFATE 1 AMPULE: .5; 3 SOLUTION RESPIRATORY (INHALATION) at 13:17

## 2017-09-12 RX ADMIN — PRAVASTATIN SODIUM 80 MG: 80 TABLET ORAL at 20:04

## 2017-09-12 RX ADMIN — LOSARTAN POTASSIUM 50 MG: 50 TABLET, FILM COATED ORAL at 12:59

## 2017-09-12 RX ADMIN — HEPARIN SODIUM AND DEXTROSE 15 UNITS/KG/HR: 10000; 5 INJECTION INTRAVENOUS at 19:21

## 2017-09-12 RX ADMIN — Medication 10 ML: at 20:04

## 2017-09-12 RX ADMIN — Medication 10 ML: at 08:06

## 2017-09-12 RX ADMIN — PANTOPRAZOLE SODIUM 40 MG: 40 INJECTION, POWDER, FOR SOLUTION INTRAVENOUS at 08:06

## 2017-09-12 RX ADMIN — IPRATROPIUM BROMIDE AND ALBUTEROL SULFATE 1 AMPULE: .5; 3 SOLUTION RESPIRATORY (INHALATION) at 21:02

## 2017-09-12 RX ADMIN — Medication 1 TABLET: at 12:59

## 2017-09-12 RX ADMIN — CARVEDILOL 12.5 MG: 6.25 TABLET, FILM COATED ORAL at 09:42

## 2017-09-12 RX ADMIN — Medication 500 MG: at 12:59

## 2017-09-12 RX ADMIN — ASPIRIN 81 MG: 81 TABLET, CHEWABLE ORAL at 08:06

## 2017-09-12 RX ADMIN — FENTANYL CITRATE 125 MCG/HR: 50 INJECTION, SOLUTION INTRAMUSCULAR; INTRAVENOUS at 00:58

## 2017-09-12 RX ADMIN — HYDROCODONE BITARTRATE AND ACETAMINOPHEN 1 TABLET: 5; 325 TABLET ORAL at 22:48

## 2017-09-12 RX ADMIN — IPRATROPIUM BROMIDE AND ALBUTEROL SULFATE 1 AMPULE: .5; 3 SOLUTION RESPIRATORY (INHALATION) at 17:21

## 2017-09-12 ASSESSMENT — PAIN DESCRIPTION - ORIENTATION
ORIENTATION: MID

## 2017-09-12 ASSESSMENT — PAIN DESCRIPTION - PAIN TYPE
TYPE: ACUTE PAIN

## 2017-09-12 ASSESSMENT — PAIN DESCRIPTION - DESCRIPTORS
DESCRIPTORS: DISCOMFORT

## 2017-09-12 ASSESSMENT — PAIN DESCRIPTION - FREQUENCY
FREQUENCY: CONTINUOUS

## 2017-09-12 ASSESSMENT — PAIN DESCRIPTION - LOCATION
LOCATION: GENERALIZED;THROAT
LOCATION: NECK
LOCATION: THROAT
LOCATION: NECK
LOCATION: NECK

## 2017-09-12 ASSESSMENT — PAIN DESCRIPTION - ONSET
ONSET: ON-GOING

## 2017-09-12 ASSESSMENT — PAIN SCALES - GENERAL
PAINLEVEL_OUTOF10: 0
PAINLEVEL_OUTOF10: 6
PAINLEVEL_OUTOF10: 5
PAINLEVEL_OUTOF10: 3
PAINLEVEL_OUTOF10: 3
PAINLEVEL_OUTOF10: 6
PAINLEVEL_OUTOF10: 3
PAINLEVEL_OUTOF10: 6

## 2017-09-12 ASSESSMENT — PULMONARY FUNCTION TESTS
PIF_VALUE: 28
PIF_VALUE: 14

## 2017-09-12 ASSESSMENT — PAIN DESCRIPTION - PROGRESSION: CLINICAL_PROGRESSION: GRADUALLY IMPROVING

## 2017-09-12 NOTE — PLAN OF CARE
Problem: Pain:  Goal: Pain level will decrease  Pain level will decrease   Outcome: Ongoing  Goal: Control of acute pain  Control of acute pain   Outcome: Ongoing    Problem: Cardiovascular  Goal: No DVT, peripheral vascular complications  Outcome: Ongoing  Remains heparin gtt.   Goal: Hemodynamic stability  Outcome: Ongoing    Problem: Respiratory  Goal: O2 Sat > 90%  Outcome: Ongoing    Problem: Skin Integrity/Risk  Goal: No skin breakdown during hospitalization  Outcome: Ongoing    Problem: Musculor/Skeletal Functional Status  Goal: Absence of falls  Outcome: Ongoing    Problem: Discharge Planning:  Goal: Discharged to appropriate level of care  Discharged to appropriate level of care   Outcome: Ongoing    Problem: Restraint Use - Nonviolent/Non-Self-Destructive Behavior:  Goal: Absence of restraint indications  Absence of restraint indications   Outcome: Ongoing  Goal: Absence of restraint-related injury  Absence of restraint-related injury   Outcome: Ongoing    Problem: Nutrition  Goal: Optimal nutrition therapy  Outcome: Ongoing    Problem: Infection - Central Venous Catheter-Associated Bloodstream Infection:  Goal: Will show no infection signs and symptoms  Will show no infection signs and symptoms   Outcome: Ongoing

## 2017-09-12 NOTE — PLAN OF CARE
Problem: Pain:  Goal: Pain level will decrease  Pain level will decrease   Outcome: Ongoing  Pt reports discomfort from ETT and OG to throat. Medicated with tylenol. On fentanyl drip. Goal: Control of acute pain  Control of acute pain   Outcome: Ongoing    Problem: Cardiovascular  Goal: No DVT, peripheral vascular complications  Outcome: Ongoing  Patient on heparin gtt. APTT therapeutic. Denies pain to calves, bilateral lower extremities. Pulses palpable x 4 extremities. Goal: Hemodynamic stability  Outcome: Ongoing  Levophed drip weaned. BP stable. Remains intubated on ventilator. HR NSR. Problem: Respiratory  Goal: O2 Sat > 90%  Outcome: Ongoing  Remains intubated. O2 saturation greater than 92% on ventilator. Problem: Skin Integrity/Risk  Goal: No skin breakdown during hospitalization  Outcome: Ongoing  Patient assisted to turn and reposition every 2 hours and PRN. Skin intact without signs of breakdown. Oral care completed every 2 hours and as needed. Mucous membranes without lesions. Problem: Musculor/Skeletal Functional Status  Goal: Absence of falls  Outcome: Met This Shift  Remains intubated on ventilator. Patient on bedrest.  Falling star precautions in place and bed alarm is on. Patient able to use call light and communicate needs. Does not attempt to get out of bed on shift. Problem: Discharge Planning:  Goal: Discharged to appropriate level of care  Discharged to appropriate level of care   Outcome: Ongoing  Will monitor for potential discharge needs. Problem: Restraint Use - Nonviolent/Non-Self-Destructive Behavior:  Goal: Absence of restraint indications  Absence of restraint indications   Outcome: Met This Shift  Restraints removed at midnight from patient's extremities. Verbalizes and demonstrates understanding that he cannot remove ETT or pull at medical devices. Goal: Absence of restraint-related injury  Absence of restraint-related injury   Outcome:  Met

## 2017-09-12 NOTE — FLOWSHEET NOTE
09/12/17 0133   Provider Notification   Reason for Communication Evaluate; Review case   Provider Name Lianna Owens   Provider Notification Physician   Method of Communication Call   Response See orders   Notification Time 0133   Shift Event Other (comment)   Spoke with Dr. Perri Newberry. Patient expressing discomfort. Ordered to increase fentanyl drip to 200 mcg/hr. Can add a versed drip if needed. Also notified of increased ectopy. Orders received for 1 gram magnesium and 40 meq potassium.

## 2017-09-12 NOTE — FLOWSHEET NOTE
1100 Mr Kimberlee Martino continues to rest in the bed. His RASS appears lower than what is ordered so the Diprivan and Fentanyl gtts have been titrated down. His BP is beginning to drop as well. 1200 The nitro has been stopped and the Diprivan gtt has been stopped due to lowering BP. ID has been consulted for fever. 1400 Dr Mary Alice Garcia notified of his lower BP and orders received to start levophed. Titrate as needed. Tube feeding has been started. 1600 He is alert and able to demonstrate some orientation. His family are at the bedside. 1800 He is awake and watching TV with his family. He remains restrained.

## 2017-09-12 NOTE — PROGRESS NOTES
pravastatin  80 mg Oral Daily    sodium chloride flush  10 mL Intravenous 2 times per day    aspirin  81 mg Oral Daily     PRN  dextrose, sodium chloride flush, acetaminophen, magnesium hydroxide, ondansetron, magnesium sulfate, nitroGLYCERIN, glucose, glucagon (rDNA), dextrose  IV   midazolam 2 mg/hr (17 4943)    fentaNYL (SUBLIMAZE) 500 mcg in sodium chloride 0.9 % 100 mL 75 mcg/hr (17 06)    norepinephrine Stopped (17)    dextrose      heparin (porcine) 15 Units/kg/hr (17 8703)       ABG   Lab Results   Component Value Date    PH 7.35 2017    PH 7.44 2011    PCO2 49 2017    PCO2 74 2011    PO2 83 2017    PO2 70 2011    HCO3 27 2017    O2SAT 95 2017     Lab Results   Component Value Date    IFIO2 30 2017    MODE AC 2017    SETTIDVOL 550 2017    SETPEEP 5.0 2017     Vitals    height is 5' 9\" (1.753 m) and weight is 196 lb 3.4 oz (89 kg). His temperature is 94.1 °F (34.5 °C). His blood pressure is 162/84 (abnormal) and his pulse is 95. His respiration is 19 and oxygen saturation is 99%.      O2 Flow Rate (L/min): 40 L/min  Patient Vitals for the past 8 hrs:   BP Temp Temp src Pulse Resp SpO2   17 1305 (!) 162/84 - - 95 - 99 %   17 1205 (!) 149/63 94.1 °F (34.5 °C) - 102 - 100 %   17 1105 (!) 162/80 - - 97 - 100 %   17 1003 (!) 159/77 - - 86 - 100 %   17 0905 (!) 159/74 - - 82 - 99 %   17 0805 (!) 161/106 99.7 °F (37.6 °C) CORE 77 - 99 %   17 0756 - - - 80 19 100 %   17 0755 (!) 152/75 - - 73 - 100 %   17 0700 - - - 85 18 100 %     Range Data  Temperature Range:Temp: 94.1 °F (34.5 °C)Temp  Av.7 °F (37.6 °C)  Min: 94.1 °F (34.5 °C)  Max: 101.8 °F (38.8 °C)  BP Range:Systolic (17CVE), EJV:585 , Min:72 , QM   Diastolic (44YBA), QNP:75, Min:47, Max:106    Pulse Range: Pulse  Av.6  Min: 66  Max: 110  Respiration Range:Resp  Av.6  Min: 18 0516   WBC  10.1  10.2   RBC  4.05*  3.28*   HGB  11.7*  9.4*   HCT  35.6*  28.0*   MCV  88.1  85.6   MCH  29.0  28.8   MCHC  32.9*  33.6   RDW  13.9  14.3   PLT  376  294   MPV  7.7  8.1      BMP  Recent Labs      09/11/17   0147  09/11/17   0528  09/11/17   2211  09/12/17   0516   NA  141  141   --   144   K  4.5  5.1  3.6  3.6   CL  104  104   --   104   CO2  21*  29   --   24   BUN  22  23*   --   27*   CREATININE  1.1  1.5*   --   1.8*   GLUCOSE  322*  340*   --   203*   MG  2.2   --   1.8  2.1   PHOS   --    --   2.5  3.1   CALCIUM  8.9  8.4*   --   8.2*     LFT  Recent Labs      09/10/17   0344  09/11/17   0528   AST  13  26   ALT  16  24   BILITOT  0.2*  <0.2*   ALKPHOS  66  85     TROP  Lab Results   Component Value Date    TROPONINT < 0.010 09/12/2017    TROPONINT 0.046 09/11/2017    TROPONINT < 0.010 09/11/2017   BNP: 6987    PTT  Recent Labs      09/11/17   1512  09/11/17   2211  09/12/17   1000   APTT  68.4*  71.3*  88.5*     Glucose  Recent Labs      09/11/17   1656  09/11/17   2018  09/12/17   1132   POCGLU  85  84  180*     UA   Recent Labs      09/11/17   1140   PHUR  5.0   COLORU  YELLOW   PROTEINU  TRACE*   BLOODU  MODERATE*   RBCUA  5-10   WBCUA  2-4   BACTERIA  NONE   NITRU  NEGATIVE   GLUCOSEU  >= 1000*   BILIRUBINUR  NEGATIVE   UROBILINOGEN  0.2   KETUA  NEGATIVE   .   EKG 9/11/17     Normal sinus rhythm  Low voltage QRS, consider pulmonary disease, pericardial effusion, or normal variant  Cannot rule out Anteroseptal infarct (cited on or before 26-FEB-2004)  Abnormal ECG  When compared with ECG of 11-SEP-2017 02:08,  No significant change was found  Echo 9/8/17   Summary   Technically difficult examination.   This is a suboptimal study due to poor echocardiographic window.   Doppler parameters were consistent with abnormal left ventricular   relaxation (grade 1 diastolic dysfunction).   Left ventricle size is normal.   Normal left ventricular wall thickness.   Ejection fraction is visually estimated in the range of 35% to 40%.   Unable to determine wall motion abnormalities due to poor image quality.   Doppler parameters were consistent with abnormal left ventricular   relaxation (grade 1 diastolic dysfunction)     Mitral Valve   Structurally normal mitral valve.   Normal mobility of both mitral valve leaflets.   No evidence of mitral valve stenosis.   Mild mitral regurgitation is present.      Aortic Valve   Aortic valve appears tricuspid. Aortic valve leaflets are somewhat   thickened. No evidence of aortic valve regurgitation .      Tricuspid Valve   Tricuspid valve is structurally normal.   Mild tricuspid regurgitation visualized.      Pulmonic Valve   The pulmonic valve was not well visualized .      Left Atrium   Normal size left atrium.      Left Ventricle   Left ventricle size is normal.   Normal left ventricular wall thickness.   Ejection fracti/on is visually estimated in the range of 35% to 40%.   Unable to determine wall motion abnormalities due to poor image quality.   Doppler parameters were consistent with abnormal left ventricular   relaxation (grade 1 diastolic dysfunction).      Right Atrium   The right atrium is of normal size.      Right Ventricle   Normal right ventricular size and function.   Right ventricle global systolic function is normal .   Pacer Wire visualized in right ventricle.      Pericardial Effusion   No evidence of any pericardial effusion.   Fat pad present.      Pleural Effusion   No evidence of pleural effusion. Cultures  Blood cultures x 2 9/11  Urine culture    Procalcitonin   Lab Results   Component Value Date    PROCAL 0.86 09/12/2017    PROCAL 0.28 09/11/2017    PROCAL 0.41 07/17/2017     PFT            Radiology   CXR    CXR 9/12   1.  New right-sided PICC line with tip in the right atrium. 2.  Stable bilateral mostly lower lobe infiltrates. 3.  Stable position of the NG tube and endotracheal tube. CXR 9/11   1.  Appropriately positioned lines and

## 2017-09-12 NOTE — PROGRESS NOTES
using voice recognition software. It may contain minor errors which are inherent in voice recognition technology. **      Final report electronically signed by Dr. Jacobo Beasley on 9/7/2017 12:43 PM      XR Chest Standard TWO VW    (Results Pending)   US ABDOMEN COMPLETE    (Results Pending)       Diet: DIET CARDIAC; Carb Control: 4 carbs/meal (approximate 1800 kcals/day); Low Sodium (2 GM);  Daily Fluid Restriction: 2000 ml    DVT prophylaxis: [] Lovenox                                 [x] SCDs                                 [] SQ Heparin                                 [] Encourage ambulation           [] Already on Anticoagulation     Disposition:    [x] Home       [] TCU       [] Rehab       [] Psych       [] SNF       [] [place]       [] Other-    Code Status: Full Code    PT/OT Eval Status: no    Assessment/Plan:    Anticipated Discharge in : 2-3 days    Active Hospital Problems    Diagnosis Date Noted    ALANA (obstructive sleep apnea) [G47.33] 05/02/2014     Priority: High    Acute respiratory failure with hypoxia and hypercapnia (HCC) [J96.01, J96.02] 09/11/2017    Pulmonary edema cardiac cause ([facility] [address] [I50.1] 09/11/2017    Hyperkalemia [E87.5]     Leukocytosis, unspecified [D72.829]     Normocytic anemia [D64.9]     Elevated lipase [R74.8]     Chest pain [R07.9]     Diabetes mellitus due to underlying condition with stage 3 chronic kidney disease, with long-term current use of insulin (HCC) [E08.22, N18.3, Z79.4]     Hyperglycemia [R73.9]     CKD (chronic kidney disease), stage III [N18.3]     Acute on chronic combined systolic (congestive) and diastolic (congestive) heart failure (HCC) [I50.43]     Anemia of chronic kidney failure [N18.9, D63.1]     Type 2 diabetes mellitus with hyperglycemia, with long-term current use of insulin (HCC) [E11.65, Z79.4] 08/26/2016    Shortness of breath [R06.02] 08/26/2016    Essential hypertension [I10] 08/26/2016    Chronic obstructive pulmonary disease ([facility] [address] [J44.9]     Mediastinal lymphadenopathy [R59.0]     Lung nodule [R91.1] 03/09/2012       Pulm edema, hypercapn/hypox resp failure, ALANA  · Extubating today  · Continue nightly CPAP at 13cm H2O  · Duonebs  · Monitor fluid balance, nephrology on the case    NSTEMI  · Await cardiac cath (Thursday?)    HARDY  · Nephro on the case, holding diuretics at this time   · Start mucomyst tomorrow for cath        Electronically signed by Poncho Palomo DO on 9/12/2017 at 5:29 PM

## 2017-09-12 NOTE — PLAN OF CARE
Problem: RESPIRATORY  Goal: Lung sounds clear or within normal limits for patient  Outcome: Ongoing  VENTILATOR LIBERATION PROTOCOL     PRE-TRIAL PATIENT ASSESSMENT - COMPLETED AT 0800     Ventilatory Assessment:      PARAMETER CRITERIA FOR WEANING   Reversal  of the acute disease process that prompted intubation: Yes At least partial or complete reversal   FiO2 : 30 % FIO2 less than or equal to 50%     PEEP less than or equal to 5 cm H2O   Hemodynamic stability: Yes Dopamine or Dobutamine at 5 mcg/kg/minute or less   Adequate correction of electrolytes, Hgb, and HCT: Yes Within lab range   Neurologic stability: Yes Ability to cough, voluntarily initiate ventilator effort, cooperate with pulmonary toilet measures      ABG:   Lab Results   Component Value Date     PH 7.44 09/12/2017     PH 7.44 12/09/2011     PO2 81 09/12/2017     PO2 70 12/09/2011     PCO2 36 09/12/2017     PCO2 74 12/09/2011     HCO3 25 09/12/2017     O2SAT 96 09/12/2017     HGB/WBC:  Lab Results   Component Value Date     HGB 9.4 (L) 09/12/2017     WBC 10.2 09/12/2017                    Vital Signs:      PARAMETER CRITERIA FOR WEANING Meets Criteria   Pulse: 80 Within patient's normal limits / stable Yes   Resp: 19 Less than or equal to 30 Yes   BP: (!) 140/63 Within patient's normal limits / minimal pressors (Hemodynamically Stable) Yes   SpO2: 100 % Greater than or equal to 90% Yes   Temp: 98.6 °F (37 °C) Less than 38. 5oC / 101. 3oF Yes      [X]        Based on this assessment and the Ventilator Liberation Protocol, this patient IS being placed on a Spontaneous Breathing Trial (SBT) at this time. [ ]     Based on this assessment and the Ventilator Liberation Protocol, this patient IS NOT being placed on a Spontaneous Breathing Trial (SBT) at this time.      SBT - Initiated at  0800     Ventilator Settings:  CPAP 5 cmH2O, Pressure Support 8 cmH2O  Tube Resistance Compensation (TRC) on     1 Minute SBT Respiratory Parameters:        VE:        8.6 L        RR:       17 b/m        VT:         558 mL (average VT = VE/RR)        RSBI:    37 (RR/VT in liters)        SPO2:    99 %     [X]   RR is < 35 but > than 9 per minute, RSBI is < 105, SpO2 > 92%, patient's vital signs are stable, and patient is in no apparent distress; therefore, patient is being left on SBT for up to 1 hour. [ ]   RR is > to 35 for > 2 minutes or < 10 per minute, RSBI is >105, vital signs are unstable, or patient is in distress; therefore, patient is being placed back on previous settings. SBT  Concluded at  9850 1142. Weaning Parameters/VS's at conclusion of SBT:   VE: 9 L   RR: 20 b/m   VT: 424 mL (average VT = VE/RR)   RSBI: 47 (RR/VT in liters)   SPO2: 100 %    [x]   Patient tolerated SBT for full  minutes with acceptable weaning parameters and vital signs and showed no signs of distress. []   Patient tolerated SBT for full  minutes, but had unacceptable weaning parameters or vital signs, and/or signs of distress. []   Patient was unable to tolerate SBT for  minutes and was placed back on previous settings.             COMMENTS:

## 2017-09-12 NOTE — FLOWSHEET NOTE
Patient has been awake most of the night and unable to get comfortable or restful sleep while on ventilator. Fentanyl had been titrated in attempt to maintain comfort without success. Versed drip initiated. Will continue to monitor.

## 2017-09-12 NOTE — PLAN OF CARE
Problem: Nutrition  Goal: Optimal nutrition therapy  Outcome: Ongoing  Nutrition Problem: Inadequate oral intake  Intervention: Food and/or Nutrient Delivery: Start Tube Feeding  Nutritional Goals: Pt. will meet % nutritional needs while on vent.

## 2017-09-12 NOTE — PROGRESS NOTES
Nutrition Assessment    Type and Reason for Visit: Reassess    Nutrition Recommendations:  Continue NPO status. Malnutrition Assessment:  · Malnutrition Status: At risk for malnutrition    Nutrition Diagnosis:   · Problem: Inadequate oral intake  · Etiology: related to Impaired respiratory function-inability to consume food     Signs and symptoms:  as evidenced by Intubation    Nutrition Assessment:  · Subjective Assessment: Pt. on CPAP trial & TF is on hold, Labs include: (9/12) BUN 27, Creatinine 1.8, Glucose 203, Calcium 8.2, Hemoglobin 9.4. Meds include: Bumex, Lantus, Humalog, Vitamin C, Zinc  · Wound Type: None  · Current Nutrition Therapies:  · Oral Diet Orders: NPO   · Oral Diet intake: NPO  · Oral Nutrition Supplement (ONS) Orders: None  · ONS intake: NPO  · Tube Feeding (TF) Orders:   · Feeding Route: Orogastric  · Formula: Renal  · Rate (ml/hr):on hold for CPAP trial per Francie Palencia RN    · Volume (ml/day): 698ml water from TF,  75ml from proteinex when TF is at goal Nepro 40ml/hr   · Duration: Continuous 24hrs  · Additives/Modulars: Protein  · TF Residuals: Not applicable  · Water Flushes:  (per MD)  · Current TF & Flush Orders Provides: not infusing  · Goal TF & Flush Orders Provides: Nepro 40ml/hr yields 1728kcals, 78 grams protein, 12 grams fiber, (+ 375 kcals from Diprivan 14.2ml/hr)(+ 104kcals & 26 grams protein from 1 proteinex)= total 2207kcals & 104 grams protein  · Anthropometric Measures:  · Ht: 5' 9\" (175.3 cm)   · Current Body Wt: 196 lb 3.4 oz (89 kg) (trace edema)  · Admission Body Wt:  ((9/8))  · Usual Body Wt: 208 lb (94.3 kg) ((8/25/16) per hospital records)  · % Weight Change: 6% since 8/25/16,     · Ideal Body Wt: 160 lb (72.6 kg), % Ideal Body 123%  · BMI Classification: BMI 25.0 - 29.9 Overweight  · Comparative Standards (Estimated Nutrition Needs):  · Estimated Daily Total Kcal: ~2075kcals (25kcals/kgm admit wt. 83kgm)  · Estimated Daily Protein (g): 100+ grams/kgm admit wt. 83kgm as kidney function tolerates  · Estimated Daily Fluid (ml/day): per MD    Estimated Intake vs Estimated Needs: Intake Less Than Needs    Nutrition Risk Level: High    Nutrition Interventions:   Start Tube Feeding if unable to extubate. Continued Inpatient Monitoring, Education not appropriate at this time    Nutrition Evaluation:   · Evaluation: Goals set   · Goals: Pt. will meet % nutritional needs while on vent. · Monitoring: TF Intake, TF Tolerance, Gastric Residuals, Fluid Balance, Ascites/Edema, Weight, Comparative Standards, Pertinent Labs, Chewing/Swallowing    See Adult Nutrition Doc Flowsheet for more detail.      Electronically signed by Leona Romo RD, LD on 9/12/17 at 9:25 AM    Contact Number: [phone]

## 2017-09-12 NOTE — FLOWSHEET NOTE
1030 Dr Jacki Zaidi has been given ABG results and Mr Rivera Duncan has been exubated. He was placed on CPAP for 1 hour. He was able to vocalize. 1130 He remains on CPAP. His family have been notified to bring his home CPAP in. He has family at the [facility].

## 2017-09-12 NOTE — PROGRESS NOTES
[facility]   Nephrology progress note  9/12/2017, 4:33 PM      Pt Name:    Jean Pierre Hernandez  MRN:     [id number]     [place]:    1950  Admit Date:    9/7/2017 11:53 AM  Primary Care Physician:  Narendra Franco MD   Room number  3A-12/012-A    Chief Complaint: Nephrology following for HARDY/CKD    Subjective:  Patient seen and examined earlier during rounds  Extubated  Awake and alert  Family at bedside  Pt denies any chest pain  Pt on Iv heparin and IV vanco    Objective:  24HR INTAKE/OUTPUT:      Intake/Output Summary (Last 24 hours) at 09/12/17 1633  Last data filed at 09/12/17 1444   Gross per 24 hour   Intake             3344 ml   Output             1750 ml   Net             1594 ml     I/O last 3 completed shifts: In: 4930 [P.O.:240; I.V.:2638; NG/GT:466]  Out: 5296 [Urine:1750]     Admission weight: 174 lb 2.6 oz (79 kg)  Wt Readings from Last 3 Encounters:   09/12/17 196 lb 3.4 oz (89 kg)   08/09/17 177 lb 3.2 oz (80.4 kg)   07/20/17 195 lb 12.8 oz (88.8 kg)     Body mass index is 28.98 kg/(m^2).     Physical examination  VITALS:     Vitals:    09/12/17 1305   BP: (!) 162/84   Pulse: 95   Resp:    Temp:    SpO2: 99%     General Appearance: awake, alert, extubated, comfortable, no distress  Mouth/Throat: moist  Neck: No JVD  Lungs: Air entry B/L, decreased at bases  Heart:  S1, S2 heard  GI: soft, no distention, no abd wall edema  Extremities: no sig LE edema  No pre-sacral or thigh edema      Lab Data  CBC:   Recent Labs      09/11/17   0528  09/12/17   0516   WBC  10.1  10.2   HGB  11.7*  9.4*   HCT  35.6*  28.0*   PLT  376  294     BMP:  Recent Labs      09/11/17   0147  09/11/17   0528  09/11/17   2211  09/12/17   0516   NA  141  141   --   144   K  4.5  5.1  3.6  3.6   CL  104  104   --   104   CO2  21*  29   --   24   BUN  22  23*   --   27*   CREATININE  1.1  1.5*   --   1.8*   GLUCOSE  322*  340*   --   203*   CALCIUM  8.9  8.4*   --   8.2*   MG  2.2   --   1.8  2.1   PHOS   -- --   2.5  3.1     Hepatic:   Recent Labs      09/10/17   0344  09/11/17   0528   LABALBU  3.2*  3.3*   AST  13  26   ALT  16  24   BILITOT  0.2*  <0.2*   ALKPHOS  66  85         Meds:  Infusion:    dextrose      heparin (porcine) 15 Units/kg/hr (09/11/17 2319)     Meds:    potassium (CARDIAC) replacement protocol   Other RX Placeholder    folbee plus  1 tablet Oral Daily    vitamin C  500 mg Oral Daily    losartan  50 mg Oral Daily    carvedilol  12.5 mg Oral BID WC    hydrALAZINE  10 mg Oral TID    insulin lispro  5-17 Units Subcutaneous TID WC    ipratropium-albuterol  1 ampule Inhalation Q4H WA    [START ON 9/13/2017] pantoprazole  40 mg Oral QAM AC    insulin glargine  30 Units Subcutaneous BID    ciprofloxacin  400 mg Intravenous Q12H    piperacillin-tazobactam  3.375 g Intravenous Q8H    lidocaine PF  5 mL Intradermal Once    phosphorus replacement protocol   Other RX Placeholder    calcium replacement protocol   Other RX Placeholder    vancomycin  1,500 mg Intravenous Q36H    vancomycin (VANCOCIN) intermittent dosing (placeholder)   Other RX Placeholder    levothyroxine  25 mcg Oral Daily    pravastatin  80 mg Oral Daily    sodium chloride flush  10 mL Intravenous 2 times per day    aspirin  81 mg Oral Daily     Meds prn: dextrose, sodium chloride flush, acetaminophen, magnesium hydroxide, ondansetron, magnesium sulfate, nitroGLYCERIN, glucose, glucagon (rDNA), dextrose       Impression and Plan:  1. HARDY on CKd III:  HARDY in setting of respiratory decompensation in presence of hypotension/sepsis  - Creat today upto 1.8, likely due to recent diuresis and hypotension  - avoid nephrotoxins  - would avoid ACEI/ARB given HARDY and cardiology planning cardiac catheterization in next few days. Hold losartan for now. - monitor urine output and lytes    2.  Acute respiratory failure with pulmonary edema and acute on systolic heart failure  - s/p extubation, EF 35-40% with grade I diastolic

## 2017-09-13 ENCOUNTER — APPOINTMENT (OUTPATIENT)
Dept: GENERAL RADIOLOGY | Age: 67
DRG: 208 | End: 2017-09-13
Payer: MEDICARE

## 2017-09-13 LAB
ALBUMIN SERPL-MCNC: 2.8 G/DL (ref 3.5–5.1)
ALP BLD-CCNC: 75 U/L (ref 38–126)
ALT SERPL-CCNC: 18 U/L (ref 11–66)
ANION GAP SERPL CALCULATED.3IONS-SCNC: 10 MEQ/L (ref 8–16)
APTT: 58.5 SECONDS (ref 22–38)
APTT: 67.3 SECONDS (ref 22–38)
APTT: 71.3 SECONDS (ref 22–38)
AST SERPL-CCNC: 16 U/L (ref 5–40)
BILIRUB SERPL-MCNC: 0.3 MG/DL (ref 0.3–1.2)
BUN BLDV-MCNC: 17 MG/DL (ref 7–22)
CALCIUM SERPL-MCNC: 8.5 MG/DL (ref 8.5–10.5)
CHLORIDE BLD-SCNC: 104 MEQ/L (ref 98–111)
CO2: 28 MEQ/L (ref 23–33)
CREAT SERPL-MCNC: 1.5 MG/DL (ref 0.4–1.2)
GFR SERPL CREATININE-BSD FRML MDRD: 46 ML/MIN/1.73M2
GLUCOSE BLD-MCNC: 111 MG/DL (ref 70–108)
GLUCOSE BLD-MCNC: 190 MG/DL (ref 70–108)
GLUCOSE BLD-MCNC: 200 MG/DL (ref 70–108)
GLUCOSE BLD-MCNC: 231 MG/DL (ref 70–108)
GLUCOSE BLD-MCNC: 277 MG/DL (ref 70–108)
GRAM STAIN RESULT: NORMAL
HCT VFR BLD CALC: 28.9 % (ref 42–52)
HEMOGLOBIN: 9.6 GM/DL (ref 14–18)
MAGNESIUM: 1.9 MG/DL (ref 1.6–2.4)
MCH RBC QN AUTO: 28.7 PG (ref 27–31)
MCHC RBC AUTO-ENTMCNC: 33.2 GM/DL (ref 33–37)
MCV RBC AUTO: 86.3 FL (ref 80–94)
MRSA SCREEN: NORMAL
PDW BLD-RTO: 13.6 % (ref 11.5–14.5)
PLATELET # BLD: 306 THOU/MM3 (ref 130–400)
PMV BLD AUTO: 7.9 MCM (ref 7.4–10.4)
POTASSIUM SERPL-SCNC: 4.6 MEQ/L (ref 3.5–5.2)
PROCALCITONIN: 0.47 NG/ML (ref 0.01–0.09)
RBC # BLD: 3.35 MILL/MM3 (ref 4.7–6.1)
RESPIRATORY CULTURE: NORMAL
SODIUM BLD-SCNC: 142 MEQ/L (ref 135–145)
STREP PNEUMO AG, UR: NEGATIVE
TOTAL PROTEIN: 5.8 G/DL (ref 6.1–8)
URINE CULTURE REFLEX: NORMAL
VRE CULTURE: NORMAL
WBC # BLD: 10.1 THOU/MM3 (ref 4.8–10.8)

## 2017-09-13 PROCEDURE — 94669 MECHANICAL CHEST WALL OSCILL: CPT

## 2017-09-13 PROCEDURE — 84145 PROCALCITONIN (PCT): CPT

## 2017-09-13 PROCEDURE — 2580000003 HC RX 258: Performed by: INTERNAL MEDICINE

## 2017-09-13 PROCEDURE — 6370000000 HC RX 637 (ALT 250 FOR IP): Performed by: NURSE PRACTITIONER

## 2017-09-13 PROCEDURE — 86698 HISTOPLASMA ANTIBODY: CPT

## 2017-09-13 PROCEDURE — 6370000000 HC RX 637 (ALT 250 FOR IP): Performed by: INTERNAL MEDICINE

## 2017-09-13 PROCEDURE — 36415 COLL VENOUS BLD VENIPUNCTURE: CPT

## 2017-09-13 PROCEDURE — 97110 THERAPEUTIC EXERCISES: CPT

## 2017-09-13 PROCEDURE — 2500000003 HC RX 250 WO HCPCS: Performed by: ANESTHESIOLOGY

## 2017-09-13 PROCEDURE — 86635 COCCIDIOIDES ANTIBODY: CPT

## 2017-09-13 PROCEDURE — 71020 XR CHEST STANDARD TWO VW: CPT

## 2017-09-13 PROCEDURE — G8988 SELF CARE GOAL STATUS: HCPCS

## 2017-09-13 PROCEDURE — 99233 SBSQ HOSP IP/OBS HIGH 50: CPT | Performed by: INTERNAL MEDICINE

## 2017-09-13 PROCEDURE — 6360000002 HC RX W HCPCS: Performed by: INTERNAL MEDICINE

## 2017-09-13 PROCEDURE — G8987 SELF CARE CURRENT STATUS: HCPCS

## 2017-09-13 PROCEDURE — 94640 AIRWAY INHALATION TREATMENT: CPT

## 2017-09-13 PROCEDURE — 82948 REAGENT STRIP/BLOOD GLUCOSE: CPT

## 2017-09-13 PROCEDURE — 86641 CRYPTOCOCCUS ANTIBODY: CPT

## 2017-09-13 PROCEDURE — 36592 COLLECT BLOOD FROM PICC: CPT

## 2017-09-13 PROCEDURE — 97162 PT EVAL MOD COMPLEX 30 MIN: CPT

## 2017-09-13 PROCEDURE — 99232 SBSQ HOSP IP/OBS MODERATE 35: CPT | Performed by: INTERNAL MEDICINE

## 2017-09-13 PROCEDURE — 97535 SELF CARE MNGMENT TRAINING: CPT

## 2017-09-13 PROCEDURE — 85730 THROMBOPLASTIN TIME PARTIAL: CPT

## 2017-09-13 PROCEDURE — 85027 COMPLETE CBC AUTOMATED: CPT

## 2017-09-13 PROCEDURE — 80053 COMPREHEN METABOLIC PANEL: CPT

## 2017-09-13 PROCEDURE — 2100000000 HC CCU R&B

## 2017-09-13 PROCEDURE — 86612 BLASTOMYCES ANTIBODY: CPT

## 2017-09-13 PROCEDURE — 97165 OT EVAL LOW COMPLEX 30 MIN: CPT

## 2017-09-13 PROCEDURE — 83735 ASSAY OF MAGNESIUM: CPT

## 2017-09-13 PROCEDURE — 2700000000 HC OXYGEN THERAPY PER DAY

## 2017-09-13 RX ORDER — DIPHENHYDRAMINE HCL 25 MG
25 TABLET ORAL NIGHTLY PRN
Status: DISCONTINUED | OUTPATIENT
Start: 2017-09-13 | End: 2017-09-17 | Stop reason: HOSPADM

## 2017-09-13 RX ORDER — LOSARTAN POTASSIUM 50 MG/1
50 TABLET ORAL DAILY
Status: DISCONTINUED | OUTPATIENT
Start: 2017-09-13 | End: 2017-09-13

## 2017-09-13 RX ORDER — SODIUM CHLORIDE 9 MG/ML
INJECTION, SOLUTION INTRAVENOUS CONTINUOUS
Status: DISCONTINUED | OUTPATIENT
Start: 2017-09-13 | End: 2017-09-14

## 2017-09-13 RX ORDER — CARVEDILOL 25 MG/1
25 TABLET ORAL 2 TIMES DAILY WITH MEALS
Status: DISCONTINUED | OUTPATIENT
Start: 2017-09-13 | End: 2017-09-13

## 2017-09-13 RX ORDER — HYDROCHLOROTHIAZIDE 25 MG/1
25 TABLET ORAL DAILY
Status: DISCONTINUED | OUTPATIENT
Start: 2017-09-13 | End: 2017-09-13

## 2017-09-13 RX ORDER — ACETYLCYSTEINE 200 MG/ML
1200 SOLUTION ORAL; RESPIRATORY (INHALATION) 2 TIMES DAILY
Status: COMPLETED | OUTPATIENT
Start: 2017-09-13 | End: 2017-09-15

## 2017-09-13 RX ORDER — LANOLIN ALCOHOL/MO/W.PET/CERES
3 CREAM (GRAM) TOPICAL NIGHTLY PRN
Status: DISCONTINUED | OUTPATIENT
Start: 2017-09-13 | End: 2017-09-17 | Stop reason: HOSPADM

## 2017-09-13 RX ORDER — HYDRALAZINE HYDROCHLORIDE 25 MG/1
25 TABLET, FILM COATED ORAL 3 TIMES DAILY
Status: DISCONTINUED | OUTPATIENT
Start: 2017-09-13 | End: 2017-09-17 | Stop reason: HOSPADM

## 2017-09-13 RX ADMIN — PIPERACILLIN SODIUM,TAZOBACTAM SODIUM 3.38 G: 3; .375 INJECTION, POWDER, FOR SOLUTION INTRAVENOUS at 06:16

## 2017-09-13 RX ADMIN — CIPROFLOXACIN 400 MG: 2 INJECTION, SOLUTION INTRAVENOUS at 03:44

## 2017-09-13 RX ADMIN — IPRATROPIUM BROMIDE AND ALBUTEROL SULFATE 1 AMPULE: .5; 3 SOLUTION RESPIRATORY (INHALATION) at 20:07

## 2017-09-13 RX ADMIN — LEVOTHYROXINE SODIUM 25 MCG: 25 TABLET ORAL at 06:18

## 2017-09-13 RX ADMIN — PRAVASTATIN SODIUM 80 MG: 80 TABLET ORAL at 19:54

## 2017-09-13 RX ADMIN — INSULIN GLARGINE 30 UNITS: 100 INJECTION, SOLUTION SUBCUTANEOUS at 09:22

## 2017-09-13 RX ADMIN — LABETALOL HYDROCHLORIDE 10 MG: 5 INJECTION, SOLUTION INTRAVENOUS at 09:22

## 2017-09-13 RX ADMIN — ASPIRIN 81 MG: 81 TABLET, CHEWABLE ORAL at 09:22

## 2017-09-13 RX ADMIN — PIPERACILLIN SODIUM,TAZOBACTAM SODIUM 3.38 G: 3; .375 INJECTION, POWDER, FOR SOLUTION INTRAVENOUS at 15:02

## 2017-09-13 RX ADMIN — CARVEDILOL 37.5 MG: 25 TABLET, FILM COATED ORAL at 12:31

## 2017-09-13 RX ADMIN — PIPERACILLIN SODIUM,TAZOBACTAM SODIUM 3.38 G: 3; .375 INJECTION, POWDER, FOR SOLUTION INTRAVENOUS at 23:00

## 2017-09-13 RX ADMIN — Medication 10 ML: at 09:35

## 2017-09-13 RX ADMIN — SODIUM CHLORIDE: 9 INJECTION, SOLUTION INTRAVENOUS at 23:12

## 2017-09-13 RX ADMIN — HEPARIN SODIUM AND DEXTROSE 17 UNITS/KG/HR: 10000; 5 INJECTION INTRAVENOUS at 13:55

## 2017-09-13 RX ADMIN — LABETALOL HYDROCHLORIDE 10 MG: 5 INJECTION, SOLUTION INTRAVENOUS at 03:48

## 2017-09-13 RX ADMIN — HYDRALAZINE HYDROCHLORIDE 25 MG: 25 TABLET, FILM COATED ORAL at 10:06

## 2017-09-13 RX ADMIN — IPRATROPIUM BROMIDE AND ALBUTEROL SULFATE 1 AMPULE: .5; 3 SOLUTION RESPIRATORY (INHALATION) at 16:22

## 2017-09-13 RX ADMIN — HYDRALAZINE HYDROCHLORIDE 25 MG: 25 TABLET, FILM COATED ORAL at 19:52

## 2017-09-13 RX ADMIN — INSULIN GLARGINE 30 UNITS: 100 INJECTION, SOLUTION SUBCUTANEOUS at 20:45

## 2017-09-13 RX ADMIN — Medication 10 ML: at 19:55

## 2017-09-13 RX ADMIN — PANTOPRAZOLE SODIUM 40 MG: 40 TABLET, DELAYED RELEASE ORAL at 06:18

## 2017-09-13 RX ADMIN — CARVEDILOL 37.5 MG: 25 TABLET, FILM COATED ORAL at 18:11

## 2017-09-13 RX ADMIN — Medication 3.3 MG: at 12:32

## 2017-09-13 RX ADMIN — ACETYLCYSTEINE 1200 MG: 200 SOLUTION ORAL; RESPIRATORY (INHALATION) at 19:09

## 2017-09-13 RX ADMIN — HYDRALAZINE HYDROCHLORIDE 25 MG: 25 TABLET, FILM COATED ORAL at 15:00

## 2017-09-13 RX ADMIN — IPRATROPIUM BROMIDE AND ALBUTEROL SULFATE 1 AMPULE: .5; 3 SOLUTION RESPIRATORY (INHALATION) at 07:55

## 2017-09-13 RX ADMIN — Medication 1 TABLET: at 09:22

## 2017-09-13 RX ADMIN — Medication 3 MG: at 23:17

## 2017-09-13 RX ADMIN — IPRATROPIUM BROMIDE AND ALBUTEROL SULFATE 1 AMPULE: .5; 3 SOLUTION RESPIRATORY (INHALATION) at 12:28

## 2017-09-13 RX ADMIN — Medication 500 MG: at 09:22

## 2017-09-13 ASSESSMENT — PAIN SCALES - GENERAL
PAINLEVEL_OUTOF10: 0

## 2017-09-13 NOTE — PROGRESS NOTES
for dialysis. He understood all of this. Will start gentle hydration tonight to minimize risk and start mucomyst 1200 mg po BID for 4 doses. - No NSAIDs, No ACEI or ARB    2. Acute respiratory failure with pulmonary edema and acute on systolic heart failure  - s/p extubation, EF 35-40% with grade I diastolic dysfunction  - diuretics as needed    3. Hypertension: increased coreg and hydralazine  - please hold ACEI/ARB for now    4. Fever/sepsis: on IV abx, ID following  5. Acute on chronic systolic heart failure with low EF/Grade I diastolic dysfunction: okay to give diuretics as needed  6. NSTEMI: on nitro and heparin drip, elevated cardiac enzymes, cardiology planning cardiac catheterization on Thursday. D/W patient as above. Recheck labs in am prior to cath  7. Hx DM  8.  COPD    D/W patient and CINDI Gao MD  [facility]

## 2017-09-13 NOTE — PLAN OF CARE
Problem: RESPIRATORY  Goal: Lung sounds clear or within normal limits for patient  Outcome: Met This Shift  Breath sounds are celar

## 2017-09-13 NOTE — PROGRESS NOTES
Admit Date: 9/7/2017  Hospital day {0-10:73323}    Subjective:     Patient {has/has no c/o:96567}. Medication side effects: {med side fx:83051}    Scheduled Meds:   potassium (CARDIAC) replacement protocol   Other RX Placeholder    folbee plus  1 tablet Oral Daily    vitamin C  500 mg Oral Daily    carvedilol  12.5 mg Oral BID WC    hydrALAZINE  10 mg Oral TID    insulin lispro  5-17 Units Subcutaneous TID WC    ipratropium-albuterol  1 ampule Inhalation Q4H WA    [START ON 9/13/2017] pantoprazole  40 mg Oral QAM AC    insulin glargine  30 Units Subcutaneous BID    ciprofloxacin  400 mg Intravenous Q12H    piperacillin-tazobactam  3.375 g Intravenous Q8H    lidocaine PF  5 mL Intradermal Once    phosphorus replacement protocol   Other RX Placeholder    calcium replacement protocol   Other RX Placeholder    vancomycin  1,500 mg Intravenous Q36H    vancomycin (VANCOCIN) intermittent dosing (placeholder)   Other RX Placeholder    levothyroxine  25 mcg Oral Daily    pravastatin  80 mg Oral Daily    sodium chloride flush  10 mL Intravenous 2 times per day    aspirin  81 mg Oral Daily     Continuous Infusions:   dextrose      heparin (porcine) 15 Units/kg/hr (09/12/17 1921)     PRN Meds:dextrose, sodium chloride flush, acetaminophen, magnesium hydroxide, ondansetron, magnesium sulfate, nitroGLYCERIN, glucose, glucagon (rDNA), dextrose    Review of Systems  {Review Of Systems:95180}    Objective:     Patient Vitals for the past 8 hrs:   BP Temp Temp src Pulse Resp SpO2   09/12/17 2000 (!) 170/84 - - 89 19 95 %   09/12/17 1900 (!) 167/80 100.8 °F (38.2 °C) CORE 92 19 94 %   09/12/17 1805 (!) 168/81 - - 91 - 94 %   09/12/17 1705 (!) 170/88 - - 91 - 98 %   09/12/17 1605 (!) 173/78 101.1 °F (38.4 °C) CORE 93 - 97 %   09/12/17 1505 (!) 96/53 - - 98 - 97 %   09/12/17 1405 (!) 170/79 - - 96 - 97 %   09/12/17 1305 (!) 162/84 - - 95 - 99 %     I/O last 3 completed shifts: In: 6160 [P.O.:240;  I.V.:2638;

## 2017-09-13 NOTE — PLAN OF CARE
Problem: Pain:  Goal: Pain level will decrease  Pain level will decrease   Outcome: Ongoing  Patient's pain controlled with prn tylenol and norco.  Goal: Control of acute pain  Control of acute pain   Outcome: Ongoing  Patient's pain controlled with prn tylenol and norco.    Problem: Cardiovascular  Goal: No DVT, peripheral vascular complications  Outcome: Met This Shift  Patient remains on a heparin gtt at this time. Goal: Hemodynamic stability  Outcome: Ongoing  Patient remains off pressors at this time. Problem: Respiratory  Goal: O2 Sat > 90%  Outcome: Met This Shift  Patient is maintaining o2 sats at this time. Problem: Skin Integrity/Risk  Goal: No skin breakdown during hospitalization  Outcome: Met This Shift  No new skin breakdown at this time. Problem: Musculor/Skeletal Functional Status  Goal: Absence of falls  Outcome: Met This Shift  Patient is free of falls    Problem: Discharge Planning:  Goal: Discharged to appropriate level of care  Discharged to appropriate level of care   Outcome: Ongoing  Patient remains in CCU at this time. Problem: Nutrition  Goal: Optimal nutrition therapy  Outcome: Met This Shift  Patient is eating food and tolerating his meals well    Problem: Infection - Central Venous Catheter-Associated Bloodstream Infection:  Goal: Will show no infection signs and symptoms  Will show no infection signs and symptoms   Outcome: Ongoing  Patient has still been running a temp.

## 2017-09-13 NOTE — PROGRESS NOTES
[facility]    Patient - Carole Bond   MRN -  [id number]   [facility]   - 1950      Date of Admission -  2017 11:53 AM  Date of evaluation -  2017  3A-12/012-A   Consulting - Marcell Mckay DO  Primary Care Physician - Brittney Cordova, [address] - [address]  Chief Complaint   Hypercapnic respiratory failure/pulmonary edema/aspiration pneumonia/ventilator management  Active Hospital Problem List      Active Hospital Problems    Diagnosis Date Noted    ALANA (obstructive sleep apnea) [G47.33] 2014     Priority: High    Acute hypercapnic respiratory failure (HCC) [J96.02] 2017    Pulmonary edema cardiac cause ([facility] [address] [I50.1] 2017    Hyperkalemia [E87.5]     Leukocytosis, unspecified [D72.829]     Normocytic anemia [D64.9]     Elevated lipase [R74.8]     Chest pain [R07.9]     Diabetes mellitus due to underlying condition with stage 3 chronic kidney disease, with long-term current use of insulin (HCC) [E08.22, N18.3, Z79.4]     Hyperglycemia [R73.9]     CKD (chronic kidney disease), stage III [N18.3]     Acute on chronic combined systolic (congestive) and diastolic (congestive) heart failure (HCC) [I50.43]     Anemia of chronic kidney failure [N18.9, D63.1]     Type 2 diabetes mellitus with hyperglycemia, with long-term current use of insulin (HCC) [E11.65, Z79.4] 2016    Shortness of breath [R06.02] 2016    Essential hypertension [I10] 2016    Chronic obstructive pulmonary disease ([facility] [address] [J44.9]     Mediastinal lymphadenopathy [R59.0]     Lung nodule [R91.1] 2012     HPI   The patient is a 69 y.o. male with significant past medical history of severe COPD and ALANA compliant with cpap, stable lung nodule, mediastinal lymphadenopathy (followed by Dr. Yu Flores), CAD with chronic systolic/diastolic HF, DM, HTN, CKD and remote nicotine dependence who presented to the hospital on 17 with chest pain. The plan was to proceed with [facility] today, however he rapidly decompensated over night with pulmonary edema/NSTEMI resulting in hypercapnic/hypoxic respiratory failure requiring intubation. Follow up ABG revealed pH 7.24 PCO2 63 HC03 247 P02 450 on AC 14, , PEEP 5. Rate was increased to 18 and VT to 550 with repeat pH 7.39 and C02 41 P02 81 with PEEP 5 and 40% FI02. CXR showed pulmonary edema with bilateral pulmonary infiltrates. WBC climbed to 12.2 and is now febrile with TM of 102.7. Although likely discrepancy in scales, his weight is up 22 lbs and nearly 5 Liters ahead on fluid. We are asked to evaluate for critical care/ventilator management. At the time of my evaluation he is sedate on the above vent settings. Procalcitonin is 0.28. He has not responded to IV diuresis, he remains afebrile and SBP 80's.   9/12: Extubated    Past 24 hour events   Looks and feels good  Complains of sore throat and requesting something for sleep  Temp is low grade  92-96% on 2 Liters per nc (he is not on home O2)  Sputum is growing many gram positive cocci  CXR pending  BP remains elevated  He is 4.7 Liters ahead on fluid  Question accuracy of weights but continues to trend up  [facility] tomorrow or Friday    Meds      hydrALAZINE  25 mg Oral TID    carvedilol  25 mg Oral BID WC    potassium (CARDIAC) replacement protocol   Other RX Placeholder    folbee plus  1 tablet Oral Daily    vitamin C  500 mg Oral Daily    insulin lispro  5-17 Units Subcutaneous TID WC    ipratropium-albuterol  1 ampule Inhalation Q4H WA    pantoprazole  40 mg Oral QAM AC    insulin glargine  30 Units Subcutaneous BID    ciprofloxacin  400 mg Intravenous Q12H    piperacillin-tazobactam  3.375 g Intravenous Q8H    lidocaine PF  5 mL Intradermal Once    phosphorus replacement protocol   Other RX Placeholder    calcium replacement protocol   Other RX Placeholder    vancomycin  1,500 mg Intravenous Q36H    vancomycin (VANCOCIN) intermittent dosing (placeholder)   Other RX Placeholder    levothyroxine  25 mcg Oral Daily    pravastatin  80 mg Oral Daily    sodium chloride flush  10 mL Intravenous 2 times per day    aspirin  81 mg Oral Daily     PRN  labetalol, HYDROcodone 5 mg - acetaminophen **OR** HYDROcodone 5 mg - acetaminophen, dextrose, sodium chloride flush, acetaminophen, magnesium hydroxide, ondansetron, magnesium sulfate, nitroGLYCERIN, glucose, glucagon (rDNA), dextrose  IV   dextrose      heparin (porcine) 17 Units/kg/hr (17 1026)       ABG   Lab Results   Component Value Date    PH 7.35 2017    PH 7.44 2011    PCO2 49 2017    PCO2 74 2011    PO2 83 2017    PO2 70 2011    HCO3 27 2017    O2SAT 95 2017     Lab Results   Component Value Date    IFIO2 30 2017    MODE AC 2017    SETTIDVOL 550 2017    SETPEEP 5.0 2017     Vitals    height is 5' 9\" (1.753 m) and weight is 202 lb 9.6 oz (91.9 kg). His core temperature is 100.9 °F (38.3 °C). His blood pressure is 176/86 (abnormal) and his pulse is 101. His respiration is 14 and oxygen saturation is 96%.      O2 Flow Rate (L/min): 2 L/min  Patient Vitals for the past 8 hrs:   BP Temp Temp src Pulse Resp SpO2 Weight   17 0805 - 100.9 °F (38.3 °C) CORE - - - -   17 0756 - - - - - 96 % -   17 0700 (!) 176/86 - - 101 14 92 % -   17 0600 (!) 176/96 - - 86 14 92 % -   17 0500 (!) 169/92 - - 80 16 92 % -   17 0456 - - - - - - 202 lb 9.6 oz (91.9 kg)   17 0400 (!) 151/76 - - 79 16 94 % -   17 0319 - 99.3 °F (37.4 °C) CORE - - - -   17 0300 (!) 161/75 - - 81 15 98 % -     Range Data  Temperature Range:Temp: 100.9 °F (38.3 °C)Temp  Av.4 °F (37.4 °C)  Min: 94.1 °F (34.5 °C)  Max: 101.1 °F (38.4 °C)  BP Range:Systolic (20OFI), MGA:945 , Min:96 , CPL:793   Diastolic (51LRG), AHX:37, Min:53, Max:96    Pulse Range: Pulse  Av.6  Min: 79  Max: 102  Respiration Range:Resp  Avg: 15.8  Min: 12  Max: 19  Current Pulse Ox:  SpO2: 96 %  24HR Pulse Ox Range:SpO2  Av.7 %  Min: 92 %  Max: 100 %  I/O        Intake/Output Summary (Last 24 hours) at 17 1038  Last data filed at 17 1008   Gross per 24 hour   Intake             2335 ml   Output             3700 ml   Net            -1365 ml     I/O last 3 completed shifts: In: 2335 [P.O.:640; I.V.:1695]  Out: 3000 [Urine:3000]     Date 17 0000 - 17 2359   Shift 6334-2325 1227-3866 6765-5436 24 Hour Total   I  N  T  A  K  E   P.O. 300   300    I.V.  (mL/kg/hr) 277  (0.4)   277    Shift Total  (mL/kg) 577  (6.3)   577  (6.3)   O  U  T  P  U  T   Urine  (mL/kg/hr) 1000  (1.4) 700  1700    Shift Total  (mL/kg) 1000  (10.9) 700  (7.6)  1700  (18.5)   Weight (kg) 91.9 91.9 91.9 91.9     Patient Vitals for the past 96 hrs (Last 3 readings):   Weight   17 0456 202 lb 9.6 oz (91.9 kg)   17 0400 196 lb 3.4 oz (89 kg)   17 0255 196 lb 3.4 oz (89 kg)     Lines, ET tube, Devices, Drains   PICC  Jordan  Exam   General Appearance  Alert, conversing in no acute distress  HEENT - Head is normocephalic  Neck - Soft, trachea midline and straight, +JVD  Lungs - Respirations even and unlabored, Lungs with diminished air excursion bases with bibasilar rales, no wheezing. Cardiovascular - Heart sounds are normal.  Regular rhythm, normal rate without murmur, gallop or rub. NSR. Abdomen - Soft, nondistended, nontender with Positive BS. Jordan with clear yellow urine. Neurologic - No focal deficits. Skin - No bruising or bleeding  Extremities - Warm and dry. No cyanosis, clubbing or edema. Pulses are preserved.   Vascular - Skin turgor normal, capillary fill normal.  Labs   ABG  Lab Results   Component Value Date    PH 7.39 2017    PO2 81 2017    PCO2 41 2017    HCO3 25 2017    O2SAT 96 2017     Lab Results   Component Value Date    IFIO2 30 2017    MODE AC 2017 with abnormal left ventricular   relaxation (grade 1 diastolic dysfunction).   Left ventricle size is normal.   Normal left ventricular wall thickness.   Ejection fraction is visually estimated in the range of 35% to 40%.   Unable to determine wall motion abnormalities due to poor image quality.   Doppler parameters were consistent with abnormal left ventricular   relaxation (grade 1 diastolic dysfunction)     Mitral Valve   Structurally normal mitral valve.   Normal mobility of both mitral valve leaflets.   No evidence of mitral valve stenosis.   Mild mitral regurgitation is present.      Aortic Valve   Aortic valve appears tricuspid. Aortic valve leaflets are somewhat   thickened. No evidence of aortic valve regurgitation .      Tricuspid Valve   Tricuspid valve is structurally normal.   Mild tricuspid regurgitation visualized.      Pulmonic Valve   The pulmonic valve was not well visualized .      Left Atrium   Normal size left atrium.      Left Ventricle   Left ventricle size is normal.   Normal left ventricular wall thickness.   Ejection fracti/on is visually estimated in the range of 35% to 40%.   Unable to determine wall motion abnormalities due to poor image quality.   Doppler parameters were consistent with abnormal left ventricular   relaxation (grade 1 diastolic dysfunction).      Right Atrium   The right atrium is of normal size.      Right Ventricle   Normal right ventricular size and function.   Right ventricle global systolic function is normal .   Pacer Wire visualized in right ventricle.      Pericardial Effusion   No evidence of any pericardial effusion.   Fat pad present.      Pleural Effusion   No evidence of pleural effusion.   Cultures  Blood cultures x 2 9/11  Urine culture    Procalcitonin   Lab Results   Component Value Date    PROCAL 0.86 09/12/2017    PROCAL 0.28 09/11/2017    PROCAL 0.41 07/17/2017     PFT            Radiology   CXR      Sep 13, 2017  PROCEDURE: XR CHEST STANDARD TWO Resolved  Severe COPD  Acute on chronic combined systolic/diastolic HF with BNP 7870  ALANA on home cpap at 13 cm H20  Gram positive cocci of sputum  Continue Vanco/Zosyn  Follow cultures/CXR  DuoNebs with pulmonary hygiene  Diuretics per nephrology  Wean FIO2 to keep saturation 90-92%  Cardiovascular   CAD with NSTEMI  Pulmonary edema  Acute on chronic combined systolic/diastolic HF with BNP 4236  HLD: Controlled on pravastatin  On ASA, heparin, BB, and statin  ARB on hold for increased cr and LHC  Increase coreg for BP control  For cardiac cath tomorrow or Friday  Gastrointestinal   GERD  Peptic ulcer disease prophylaxis with PPI  Renal   CKD, stage III (stable)  Nephrology following  Carefully monitor input and output  Supplement all electrolytes per ICU electrolytes replacement protocols  Infectious Disease   Sepsis: Resolved  Aspiration pneumonia vs HCAP  Gram positive cocci of sputum  Temp remains low grade  WBC's trending down  Zosyn/Vanco  Follow cultures  PA and lateral CXR pending  Check procalcitonin  Remove villalba  Hematology/Oncology   Anemia likely of chronic disease  Stable H/H  DVT prophylaxis: Heparin drip  Endocrine   Type II DM, uncontrolled A1c 9.2   Sugars are improved with increasing Lantus  Hypothyroid controlled on synthroid  Continue HD SSI coverage  Social/Spiritual/DNR/Miscellaneous   Full code  Continue PT/OT   OK for stepdown    Case discussed with nurse and patient. Questions and concerns addressed. Meds and Orders reviewed. Electronically signed by     Sylvia Russell CNP on 9/13/2017 at 10:38 AM    Addendum by Dr. Abigail Zuniga MD:  I have seen and examined the patient independently. Face to face evaluation and examination was performed. The above evaluation and note has been reviewed. Labs and radiographs were reviewed. I Have discussed with Ms. Jannie Leonardo. MEERA Abdi about this patient in detail. D/w Patient's R.N. and specific instructions given. Patient issues addressed.

## 2017-09-13 NOTE — PROGRESS NOTES
Grasp: 4/5  RUE Strength Comment: 3+/5 deltoid; 3+/5 pectoral; 4/5 biceps/triceps                             Movements Are Fluid And Coordinated: Yes     Fine Motor Skills  Fine Motor Comment: No difficulty noted with doing self care including using the suction for when he coughs and has phlegm. ADL  LE Dressing: Stand by assistance (donning slipper socks at the edge of bed)     Bed mobility  Rolling to Right: Supervision  Supine to Sit: Supervision  Scooting: Supervision    Transfers  Sit to stand: Contact guard assistance  Stand to sit: Stand by assistance    Balance  Sitting Balance: Supervision  Standing Balance: Minimal assistance (preparing to walk)     Time: 30 seconds  Activity: preparing to walk     Functional Mobility  Functional - Mobility Device: No device  Assist Level: Contact guard assistance  Functional Mobility Comments: Walked 1 ft forward and turned and stepped back from the bed to the chair                                                          Activity Tolerance:  Activity Tolerance: Patient limited by fatigue  Activity Tolerance: Pt is using 2 L of O2. He had been intubated for 1 day and today is first day off of the intubation. He has a productive cough. Treatment Initiated:  Pt completed his shaving and brushed his teeth while sitting up with setup A. He had some coughing noted. His BP was showing high systolic readings in the 391U before and up to 180 during session. His nurse was aware. He remained in the chair eating his breakfast following session. Pt is wanting to walk, he states. Reviewed with pt the POC for OT and the difference between OT and PT. Pt verbalized understanding. Assessment:  Assessment: Pt would benefit from continued skilled OT services to address above deficits. He presents with decreased endurance and some general weakness from the hospitalization. Pt is active normally. He was not using O2 or any AD for walking PTA.   Pt needed CGA for mobility and MIN A for standing balance initially when standing up. Pt did his self care while in sitting at this time. He was also on 2L of O2. Pt has fatigue with a productive cough. Performance deficits / Impairments: Decreased functional mobility , Decreased ADL status, Decreased endurance  Prognosis: Good  Discharge Recommendations: Continue to assess pending progress, Home with assist PRN    Clinical Decision Making: Clinical Decision making was of Low Complexity as the result of analysis of data from a problem focused assessment, a consideration of a limited number of treatment options, no significant comorbidities affecting the plan of care and no modification or assistance required to complete the evaluation. Patient Education:  Patient Education: OT POC; pt's goal; endurance training; ADLs while using steady breathing techniques and a steady pace. Equipment Recommendations: Other: Will continued to assess    Safety:  Safety Devices in place: Yes  Type of devices: Gait belt, Patient at risk for falls, Left in chair, Call light within reach, Nurse notified    Plan:  Times per week: 3-5x  Current Treatment Recommendations: Strengthening, Functional Mobility Training, Endurance Training, Self-Care / ADL, Patient/Caregiver Education & Training  Plan Comment: Pt would be able to return home with assistance from visiting nurses and from his spouse when medically stable  Specific instructions for Next Treatment: Functional mobility; ADLs and fatigue management; upper body exercises    Goals:  Patient goals : \"I want to start moving and doing activities more. \" pt states. Short term goals  Time Frame for Short term goals: 2-4 tx  Short term goal 1: Pt will complete doing standing ADLs for 5 minute duration with SBA to increase his endurance for ease of showering or doing simple homemaking.   Short term goal 2: Pt will demonstrate functional mobility walking to/from the bathroom or in the hallway with supervision while using any AD needed to prepare for doing yardwork or going to the store. Short term goal 3: Pt will complete BUE moderate resistance exercises with any handout and verbal cues for steady breathing technique to increase his endurance and strength for ease of doing yardwork. Short term goal 4: Pt will complete a spongebath or dressing with setup A and cues for pacing himself as needed to increase his activity tolerance for ease of helping with housework. Long term goals  Time Frame for Long term goals : None secondary to short estimated length of stay. Evaluation Complexity: Based on the findings of patient history, examination, clinical presentation, and decision making during this evaluation, this patient is of low complexity. OT G-codes  Functional Assessment Tool Used: Barthel Index of ADLs   Score: 16 of 20 possible  Functional Limitation: Self care  Self Care Current Status (): At least 20 percent but less than 40 percent impaired, limited or restricted  Self Care Goal Status ():  At least 1 percent but less than 20 percent impaired, limited or restricted

## 2017-09-13 NOTE — PROGRESS NOTES
Infectious Diseases Progress Note  Pt Name: Trever Devries  MRN: [id number]  YOB: 1950  Admit Date: 9/7/2017 11:53 AM  Date of evaluation: 9/13/2017  Primary Care Physician: Dinora Talbot MD   3A-12/012-A     Subjective: Interval History:  Patient sitting up in bed and working with OT. Cultures reviewed and U/S noted    Pt/Chart review last 24 hours:  Fever low grade, Diarrhea No, Dyspnea Yes,     Objective:      Vitals: BP (!) 176/86  Pulse 101  Temp 100.9 °F (38.3 °C) (Core)   Resp 14  Ht 5' 9\" (1.753 m)  Wt 202 lb 9.6 oz (91.9 kg)  SpO2 99%  BMI 29.92 kg/m2   HEENT: Head: Normal, normocephalic, atraumatic. Heart: regular rate and rhythm  Lungs: rales bibasilar  Abdomen: soft, non-tender; bowel sounds normal; no masses,  no organomegaly  Extremities: extremities normal, atraumatic, no cyanosis or edema  Neurologic: Mental status: alertness: alert     Data:   Current ATBs: vanco 9/12 zosyn 9/11  Scheduled Meds:   hydrALAZINE  25 mg Oral TID    carvedilol  37.5 mg Oral BID WC    potassium (CARDIAC) replacement protocol   Other RX Placeholder    folbee plus  1 tablet Oral Daily    vitamin C  500 mg Oral Daily    insulin lispro  5-17 Units Subcutaneous TID WC    ipratropium-albuterol  1 ampule Inhalation Q4H WA    pantoprazole  40 mg Oral QAM AC    insulin glargine  30 Units Subcutaneous BID    piperacillin-tazobactam  3.375 g Intravenous Q8H    phosphorus replacement protocol   Other RX Placeholder    calcium replacement protocol   Other RX Placeholder    vancomycin  1,500 mg Intravenous Q36H    vancomycin (VANCOCIN) intermittent dosing (placeholder)   Other RX Placeholder    levothyroxine  25 mcg Oral Daily    pravastatin  80 mg Oral Daily    sodium chloride flush  10 mL Intravenous 2 times per day    aspirin  81 mg Oral Daily     Continuous Infusions:   dextrose      heparin (porcine) 17 Units/kg/hr (09/13/17 1355)       I/O last 3 completed shifts:   In: 2335 [P.O.:640; I.V.:1695]  Out: 3000 [Urine:3000]  I/O this shift:  In: -   Out: 900 [Urine:900]    Intake/Output Summary (Last 24 hours) at 09/13/17 1425  Last data filed at 09/13/17 1356   Gross per 24 hour   Intake             2335 ml   Output             3900 ml   Net            -1565 ml     CBC:   Recent Labs      09/11/17 0528 09/12/17 0516 09/13/17   0330   WBC  10.1  10.2  10.1   HGB  11.7*  9.4*  9.6*   HCT  35.6*  28.0*  28.9*   PLT  376  294  306     BMP:  Recent Labs      09/11/17 0528 09/11/17 2211 09/12/17 0516 09/13/17   0330   NA  141   --   144  142   K  5.1  3.6  3.6  4.6   CL  104   --   104  104   CO2  29   --   24  28   BUN  23*   --   27*  17   CREATININE  1.5*   --   1.8*  1.5*   GLUCOSE  340*   --   203*  111*     Hepatic: Recent Labs      09/11/17 0528 09/13/17   0330   AST  26  16   ALT  24  18   BILITOT  <0.2*  0.3   ALKPHOS  85  75     CA 19-9    Cancer Antigen 19-9           Collected: 07/20/17 0515     Resulting lab: [facility]     Reference range: 0 - 35 U/mL     Value: 84 (High)     Comment: The Roche \"ECLIA\" assay is used.  Results obtained with different assay   methods cannot be used interchangeably. [address] [phone]        *Additional information available - comment           8/24/2017  5:33 PM - Mando, [facility]coh Incoming Lab Results From [facility]       Component Results      Component Value Ref Range & Units Status Collected Lab     CA 19-9 84 (H) 0 - 35 U/mL Final 07/20/2017  5:15 AM [facility]         Urine:       MICRO:    Respiratory Culture --       Endotracheal tube cultures do not correlate well with chest   x-ray results and are a poor predictor of true pulmonary   infections. Normal sarah- preliminary   Normal sarah         Gram Stain Result Moderate segmented neutrophils observed. Rare epithelial cells observed.    Many gram positive cocci occurring singly and in pairs.        Narrative:         Lab

## 2017-09-13 NOTE — PROGRESS NOTES
ml   Output             3400 ml   Net            -1921 ml       Diet:  DIET CARDIAC; Carb Control: 4 carbs/meal (approximate 1800 kcals/day); Low Sodium (2 GM); Daily Fluid Restriction: 2000 ml    Exam:  BP (!) 158/85  Pulse 106  Temp 98.6 °F (37 °C) (Oral)   Resp 14  Ht 5' 9\" (1.753 m)  Wt 202 lb 9.6 oz (91.9 kg)  SpO2 97%  BMI 29.92 kg/m2    General appearance: No apparent distress, appears stated age and cooperative. HEENT: Pupils equal, round, and reactive to light. Conjunctivae/corneas clear. Neck: Supple, with full range of motion. No jugular venous distention. Trachea midline. Respiratory:  Normal respiratory effort. Clear to auscultation, bilaterally without Rales/Wheezes/Rhonchi. Cardiovascular: Regular rate and rhythm with normal S1/S2 without murmurs, rubs or gallops. Abdomen: Soft, non-tender, non-distended with normal bowel sounds. Musculoskeletal: No clubbing, cyanosis or edema bilaterally. Full range of motion without deformity. Skin: Skin color, texture, turgor normal.  No rashes or lesions. Neurologic:  Neurovascularly intact without any focal sensory/motor deficits.  Cranial nerves: grossly intact  Psychiatric: Alert and oriented, thought content appropriate, normal insight  Capillary Refill: Brisk,< 3 seconds   Peripheral Pulses: +2 palpable, equal bilaterally       Labs:   Recent Labs      09/11/17 0528 09/12/17 0516 09/13/17   0330   WBC  10.1  10.2  10.1   HGB  11.7*  9.4*  9.6*   HCT  35.6*  28.0*  28.9*   PLT  376  294  306     Recent Labs      09/11/17 0528 09/11/17   2211  09/12/17 0516 09/13/17   0330   NA  141   --   144  142   K  5.1  3.6  3.6  4.6   CL  104   --   104  104   CO2  29   --   24  28   BUN  23*   --   27*  17   CREATININE  1.5*   --   1.8*  1.5*   CALCIUM  8.4*   --   8.2*  8.5   PHOS   --   2.5  3.1   --      Recent Labs      09/11/17 0528 09/13/17   0330   AST  26  16   ALT  24  18   BILITOT  <0.2*  0.3   ALKPHOS  85  75     No results for examination. Final report electronically signed by Dr. Neville Dickinson on 9/13/2017 8:52 AM      RADIOLOGY REPORT   Final Result      XR Chest Portable   Final Result   1. New right-sided PICC line with tip in the right atrium. 2.  Stable bilateral mostly lower lobe infiltrates. 3.  Stable position of the NG tube and endotracheal tube. **This report has been created using voice recognition software. It may contain minor errors which are inherent in voice recognition technology. **      Final report electronically signed by Dr. Fox on 9/12/2017 10:36 AM      XR Chest Portable   Final Result   1. Borderline heart size. ET tube has been inserted with its tip 4 cm proximal to antonia. 2. Hazy appearance both lungs relatively diffusely, consistent with pneumonia/pulmonary edema. No effusion is seen. **This report has been created using voice recognition software. It may contain minor errors which are inherent in voice recognition technology. **      Final report electronically signed by Dr. Nancy Pool on 9/11/2017 8:54 AM      XR Chest Portable   Final Result   1. Appropriately positioned lines and tubes. 2. Persistent bilateral pulmonary infiltrates. This may represent pulmonary edema. 3. Small bilateral pleural effusions. **This report has been created using voice recognition software. It may contain minor errors which are inherent in voice recognition technology. **      Final report electronically signed by Dr. Bethena Favre on 9/11/2017 7:11 AM      XR Chest Portable   Final Result   No acute findings, stable from the previous study of 7/18/2017. **This report has been created using voice recognition software. It may contain minor errors which are inherent in voice recognition technology. **      Final report electronically signed by Dr. Meir Hall on 9/7/2017 12:43 PM          Diet: DIET CARDIAC; Carb Control: 4 carbs/meal (approximate 1800 kcals/day); Low Sodium (2 GM);  Daily Fluid Restriction: 2000 ml    DVT prophylaxis: [] Lovenox                                 [x] SCDs                                 [] SQ Heparin                                 [] Encourage ambulation           [] Already on Anticoagulation     Disposition:    [x] Home       [] TCU       [] Rehab       [] Psych       [] SNF       [] [place]       [] Other-    Code Status: Full Code    PT/OT Eval Status: no    Assessment/Plan:    Anticipated Discharge in : 2 days    Active Hospital Problems    Diagnosis Date Noted    ALANA (obstructive sleep apnea) [G47.33] 05/02/2014     Priority: High    Other acute kidney failure ([facility] [N17.8]     Acute hypercapnic respiratory failure ([facility] [J96.02] 09/11/2017    Pulmonary edema cardiac cause ([facility] [I50.1] 09/11/2017    Hyperkalemia [E87.5]     Leukocytosis, unspecified [D72.829]     Normocytic anemia [D64.9]     Elevated lipase [R74.8]     Chest pain [R07.9]     Diabetes mellitus due to underlying condition with stage 3 chronic kidney disease, with long-term current use of insulin (HCC) [E08.22, N18.3, Z79.4]     Hyperglycemia [R73.9]     CKD (chronic kidney disease), stage III [N18.3]     Acute on chronic combined systolic (congestive) and diastolic (congestive) heart failure (HCC) [I50.43]     Anemia of chronic kidney failure [N18.9, D63.1]     Type 2 diabetes mellitus with hyperglycemia, with long-term current use of insulin (HCC) [E11.65, Z79.4] 08/26/2016    Shortness of breath [R06.02] 08/26/2016    Essential hypertension [I10] 08/26/2016    [facility] [J44.9]     Mediastinal lymphadenopathy [R59.0]     Lung nodule [R91.1] 03/09/2012       Pulm edema, hypercapn/hypox resp failure, ALANA  · Extubated on 9/12 without issue, doing well on NC O2 when seen  · Continue nightly CPAP at 13cm H2O  · Duonebs  · Monitor fluid balance, nephrology on the case    NSTEMI  · Cath planned Thursday    HARDY  · Nephro on the case, holding diuretics at this time   · Mucomyst BID for linda-cath renal protection        Electronically signed by En Aguero DO on 9/13/2017 at 6:23 PM

## 2017-09-13 NOTE — PROGRESS NOTES
[facility]  INPATIENT PHYSICAL THERAPY  EVALUATION  [facility] CCU 3A    Time In: 1146  Time Out: 1209  Timed Code Treatment Minutes: 10 Minutes  Minutes: 23        Date: 2017  Patient Name: Shameka Adamson,  Gender:  male        MRN: [id number]  : 1950  (69 y.o.)      Referring Practitioner: Sara Joel CNP  Diagnosis: Other chest pain. Additional Pertinent Hx: Admitted with chest pain with known CAD and EF 20%; on 17 pt was intubated with pulmonary edema after fluid treatment prior to potential heart catheterization.         Past Medical History:   Diagnosis Date    Gonsalves's esophagus     CAD (coronary artery disease)     s/p stent placement    Chronic combined systolic (congestive) and diastolic (congestive) heart failure (HCC)     Chronic systolic heart failure (HCC)     CKD (chronic kidney disease)     COPD (chronic obstructive pulmonary disease) ([facility]     Hyperlipidemia     Hypertension     Hypothyroid     Lung nodule     Lymphadenopathy     Osteoarthritis     Sleep apnea     Tobacco abuse     Type II or unspecified type diabetes mellitus without mention of complication, not stated as uncontrolled      Past Surgical History:   Procedure Laterality Date    CARPAL TUNNEL RELEASE      CATARACT REMOVAL Bilateral ,     COLONOSCOPY  ,,    CORONARY ANGIOPLASTY WITH STENT PLACEMENT      last one     ELBOW SURGERY      x 2    INGUINAL HERNIA REPAIR Bilateral     KNEE SURGERY Left     PA EGD TRANSORAL BIOPSY SINGLE/MULTIPLE Left 2017    EGD BIOPSY performed by Des Norris MD at [facility]    [address] [place] [address]      bilateral    UPPER GASTROINTESTINAL ENDOSCOPY  ,,,,        Restrictions/Precautions:  Restrictions/Precautions: Fall Risk    Subjective:  Chart Reviewed: Yes  Patient assessed for rehabilitation services?: Yes  Family / Caregiver Present: No  Subjective: RN approved session, Initiated: See above exercises. Assessment: Body structures, Functions, Activity limitations: Decreased functional mobility , Decreased balance, Decreased endurance, Decreased strength  Assessment: Pt admitted due to chest pain. Pt tolerates session fair, limited by decreased endurance due to prolonged bedrest and respiratory failure. Pt demonstrates decreased strength, transfers, balance, activity tolerance and gait indicating the need for skilled PT services. Prognosis: Excellent    Clinical Presentation: Moderate - Evolving with Changing Characteristics: Pt admitted due to chest pain. Pt tolerates session fair, limited by decreased endurance due to prolonged bedrest and respiratory failure. Pt demonstrates decreased strength, transfers, balance, activity tolerance and gait indicating the need for skilled PT services. Decision Making: High Complexitybased on patient assessment and decision making process of determining plan of care and establishing reasonable expectations for measurable functional outcomes    REQUIRES PT FOLLOW UP: Yes  Discharge Recommendations: Patient would benefit from continued therapy after discharge, Continue to assess pending progress    Patient Education:  Patient Education: POC    Equipment Recommendations:  Equipment Needed: No  Other: Will continue to monitor needs. Safety:  Type of devices: Gait belt, Nurse notified (Pt transferred to transport chair to go to xray)  Restraints  Initially in place: No    Plan:  Times per week: 3-5 X GM  Times per day: Daily  Specific instructions for Next Treatment: B LE strengthening, transfer training, bed mobility, gait training, stair training, HEP. Current Treatment Recommendations: Strengthening, Transfer Training, Endurance Training, Neuromuscular Re-education, Balance Training, Gait Training, Stair training, Functional Mobility Training, Home Exercise Program    Goals:  Patient goals : To go home.   Short term goals  Time Frame for Short term goals: 6-7 days  Short term goal 1: Pt to transfer supine <--> sit SBA to enable pt to get in/out of bed. Short term goal 2: Pt to transfer sit <--> stand SBA for increased functional mobility. Short term goal 3: Pt to ambulate 200 feet with/without AD SBA for household ambulation. Short term goal 4: Pt to ascend/descend 2 steps without HR CGA to enable pt to get in/out of home. Long term goals  Time Frame for Long term goals : NA due to short length of stay. Evaluation Complexity: Based on the findings of patient history, examination, clinical presentation, and decision making during this evaluation, the evaluation of Larissa Chang  is of medium complexity.

## 2017-09-13 NOTE — PLAN OF CARE
Problem: Pain:  Goal: Pain level will decrease  Pain level will decrease   Outcome: Met This Shift  Denies pain, ambulating in room, absence of chest pain at present        Problem: Cardiovascular  Goal: No DVT, peripheral vascular complications  Outcome: Met This Shift  Goal: Hemodynamic stability  Outcome: Ongoing  Hypertensive, meds per order. Problem: Respiratory  Goal: O2 Sat > 90%  Outcome: Met This Shift  On room air. Will utilize CPAP per order    Problem: Skin Integrity/Risk  Goal: No skin breakdown during hospitalization  Outcome: Met This Shift  Turns self, repositions in chair. Alert, oriented    Problem: Musculor/Skeletal Functional Status  Goal: Absence of falls  Outcome: Met This Shift  Oriented. Utilizing call light appropriately    Problem: Nutrition  Goal: Optimal nutrition therapy  Outcome: Met This Shift  Good appetite, eating 76% or greater of meals. Problem: Infection - Central Venous Catheter-Associated Bloodstream Infection:  Goal: Will show no infection signs and symptoms  Will show no infection signs and symptoms   Outcome: Met This Shift    Comments:   careplan reviewed with patient.  Verbalize understanding plan of care and contribute to goal setting

## 2017-09-14 ENCOUNTER — APPOINTMENT (OUTPATIENT)
Dept: CARDIAC CATH/INVASIVE PROCEDURES | Age: 67
DRG: 208 | End: 2017-09-14
Payer: MEDICARE

## 2017-09-14 LAB
ANION GAP SERPL CALCULATED.3IONS-SCNC: 9 MEQ/L (ref 8–16)
APTT: 80.7 SECONDS (ref 22–38)
BUN BLDV-MCNC: 15 MG/DL (ref 7–22)
CALCIUM SERPL-MCNC: 8.9 MG/DL (ref 8.5–10.5)
CHLORIDE BLD-SCNC: 102 MEQ/L (ref 98–111)
CO2: 28 MEQ/L (ref 23–33)
CREAT SERPL-MCNC: 1.4 MG/DL (ref 0.4–1.2)
EKG ATRIAL RATE: 74 BPM
EKG P AXIS: 64 DEGREES
EKG P-R INTERVAL: 164 MS
EKG Q-T INTERVAL: 462 MS
EKG QRS DURATION: 84 MS
EKG QTC CALCULATION (BAZETT): 512 MS
EKG R AXIS: 70 DEGREES
EKG T AXIS: 94 DEGREES
EKG VENTRICULAR RATE: 74 BPM
GFR SERPL CREATININE-BSD FRML MDRD: 50 ML/MIN/1.73M2
GLUCOSE BLD-MCNC: 167 MG/DL (ref 70–108)
GLUCOSE BLD-MCNC: 191 MG/DL (ref 70–108)
GLUCOSE BLD-MCNC: 221 MG/DL (ref 70–108)
GLUCOSE BLD-MCNC: 230 MG/DL (ref 70–108)
GLUCOSE BLD-MCNC: 237 MG/DL (ref 70–108)
HCT VFR BLD CALC: 27 % (ref 42–52)
HEMOGLOBIN: 9.3 GM/DL (ref 14–18)
HISTOPLASMA ANTIGEN URINE: NOT DETECTED
HISTOPLASMA ANTIGEN URINE: NOT DETECTED NG/ML
LEGIONELLA URINARY AG: NEGATIVE
MAGNESIUM: 1.9 MG/DL (ref 1.6–2.4)
MCH RBC QN AUTO: 29.3 PG (ref 27–31)
MCHC RBC AUTO-ENTMCNC: 34.2 GM/DL (ref 33–37)
MCV RBC AUTO: 85.6 FL (ref 80–94)
PDW BLD-RTO: 13.7 % (ref 11.5–14.5)
PLATELET # BLD: 354 THOU/MM3 (ref 130–400)
PMV BLD AUTO: 7.8 MCM (ref 7.4–10.4)
POTASSIUM SERPL-SCNC: 4.2 MEQ/L (ref 3.5–5.2)
PRO-BNP: ABNORMAL PG/ML (ref 0–900)
RBC # BLD: 3.16 MILL/MM3 (ref 4.7–6.1)
SODIUM BLD-SCNC: 139 MEQ/L (ref 135–145)
WBC # BLD: 7.3 THOU/MM3 (ref 4.8–10.8)

## 2017-09-14 PROCEDURE — 6370000000 HC RX 637 (ALT 250 FOR IP): Performed by: NURSE PRACTITIONER

## 2017-09-14 PROCEDURE — 85027 COMPLETE CBC AUTOMATED: CPT

## 2017-09-14 PROCEDURE — 2100000000 HC CCU R&B

## 2017-09-14 PROCEDURE — 83735 ASSAY OF MAGNESIUM: CPT

## 2017-09-14 PROCEDURE — 99232 SBSQ HOSP IP/OBS MODERATE 35: CPT | Performed by: INTERNAL MEDICINE

## 2017-09-14 PROCEDURE — 6360000002 HC RX W HCPCS

## 2017-09-14 PROCEDURE — C1894 INTRO/SHEATH, NON-LASER: HCPCS

## 2017-09-14 PROCEDURE — 6360000002 HC RX W HCPCS: Performed by: INTERNAL MEDICINE

## 2017-09-14 PROCEDURE — 82948 REAGENT STRIP/BLOOD GLUCOSE: CPT

## 2017-09-14 PROCEDURE — 80048 BASIC METABOLIC PNL TOTAL CA: CPT

## 2017-09-14 PROCEDURE — 94640 AIRWAY INHALATION TREATMENT: CPT

## 2017-09-14 PROCEDURE — 93458 L HRT ARTERY/VENTRICLE ANGIO: CPT | Performed by: INTERNAL MEDICINE

## 2017-09-14 PROCEDURE — 2780000010 HC IMPLANT OTHER

## 2017-09-14 PROCEDURE — 6370000000 HC RX 637 (ALT 250 FOR IP): Performed by: INTERNAL MEDICINE

## 2017-09-14 PROCEDURE — 85730 THROMBOPLASTIN TIME PARTIAL: CPT

## 2017-09-14 PROCEDURE — C1760 CLOSURE DEV, VASC: HCPCS

## 2017-09-14 PROCEDURE — 36415 COLL VENOUS BLD VENIPUNCTURE: CPT

## 2017-09-14 PROCEDURE — 2580000003 HC RX 258: Performed by: INTERNAL MEDICINE

## 2017-09-14 PROCEDURE — 2500000003 HC RX 250 WO HCPCS

## 2017-09-14 PROCEDURE — C1769 GUIDE WIRE: HCPCS

## 2017-09-14 PROCEDURE — A6258 TRANSPARENT FILM >16<=48 IN: HCPCS

## 2017-09-14 PROCEDURE — 93005 ELECTROCARDIOGRAM TRACING: CPT

## 2017-09-14 PROCEDURE — 83880 ASSAY OF NATRIURETIC PEPTIDE: CPT

## 2017-09-14 RX ORDER — ATROPINE SULFATE 0.4 MG/ML
0.5 AMPUL (ML) INJECTION
Status: ACTIVE | OUTPATIENT
Start: 2017-09-14 | End: 2017-09-14

## 2017-09-14 RX ORDER — SODIUM CHLORIDE 9 MG/ML
INJECTION, SOLUTION INTRAVENOUS CONTINUOUS
Status: DISCONTINUED | OUTPATIENT
Start: 2017-09-14 | End: 2017-09-14

## 2017-09-14 RX ORDER — SODIUM CHLORIDE 0.9 % (FLUSH) 0.9 %
10 SYRINGE (ML) INJECTION EVERY 12 HOURS SCHEDULED
Status: DISCONTINUED | OUTPATIENT
Start: 2017-09-14 | End: 2017-09-14 | Stop reason: SDUPTHER

## 2017-09-14 RX ORDER — ALPRAZOLAM 0.5 MG/1
0.5 TABLET ORAL
Status: ACTIVE | OUTPATIENT
Start: 2017-09-14 | End: 2017-09-14

## 2017-09-14 RX ORDER — NITROGLYCERIN 0.4 MG/1
0.4 TABLET SUBLINGUAL EVERY 5 MIN PRN
Status: DISCONTINUED | OUTPATIENT
Start: 2017-09-14 | End: 2017-09-17 | Stop reason: HOSPADM

## 2017-09-14 RX ORDER — SODIUM CHLORIDE 0.9 % (FLUSH) 0.9 %
10 SYRINGE (ML) INJECTION PRN
Status: DISCONTINUED | OUTPATIENT
Start: 2017-09-14 | End: 2017-09-17 | Stop reason: HOSPADM

## 2017-09-14 RX ORDER — DIPHENHYDRAMINE HCL 25 MG
50 TABLET ORAL ONCE
Status: COMPLETED | OUTPATIENT
Start: 2017-09-14 | End: 2017-09-14

## 2017-09-14 RX ORDER — ACETAMINOPHEN 325 MG/1
650 TABLET ORAL EVERY 4 HOURS PRN
Status: DISCONTINUED | OUTPATIENT
Start: 2017-09-14 | End: 2017-09-17 | Stop reason: HOSPADM

## 2017-09-14 RX ORDER — SODIUM CHLORIDE 9 MG/ML
100 INJECTION, SOLUTION INTRAVENOUS CONTINUOUS
Status: DISCONTINUED | OUTPATIENT
Start: 2017-09-14 | End: 2017-09-14

## 2017-09-14 RX ORDER — ONDANSETRON 2 MG/ML
4 INJECTION INTRAMUSCULAR; INTRAVENOUS EVERY 6 HOURS PRN
Status: DISCONTINUED | OUTPATIENT
Start: 2017-09-14 | End: 2017-09-17 | Stop reason: HOSPADM

## 2017-09-14 RX ORDER — SODIUM CHLORIDE 0.9 % (FLUSH) 0.9 %
10 SYRINGE (ML) INJECTION EVERY 12 HOURS SCHEDULED
Status: DISCONTINUED | OUTPATIENT
Start: 2017-09-14 | End: 2017-09-17 | Stop reason: HOSPADM

## 2017-09-14 RX ORDER — DIPHENHYDRAMINE HCL 25 MG
25 TABLET ORAL
Status: ACTIVE | OUTPATIENT
Start: 2017-09-14 | End: 2017-09-14

## 2017-09-14 RX ORDER — SODIUM CHLORIDE 0.9 % (FLUSH) 0.9 %
10 SYRINGE (ML) INJECTION PRN
Status: DISCONTINUED | OUTPATIENT
Start: 2017-09-14 | End: 2017-09-14 | Stop reason: SDUPTHER

## 2017-09-14 RX ADMIN — Medication 10 ML: at 08:24

## 2017-09-14 RX ADMIN — Medication 3 MG: at 23:42

## 2017-09-14 RX ADMIN — IPRATROPIUM BROMIDE AND ALBUTEROL SULFATE 1 AMPULE: .5; 3 SOLUTION RESPIRATORY (INHALATION) at 20:02

## 2017-09-14 RX ADMIN — ASPIRIN 81 MG: 81 TABLET, CHEWABLE ORAL at 05:42

## 2017-09-14 RX ADMIN — PRAVASTATIN SODIUM 80 MG: 80 TABLET ORAL at 21:03

## 2017-09-14 RX ADMIN — DIPHENHYDRAMINE HCL 50 MG: 25 TABLET ORAL at 05:31

## 2017-09-14 RX ADMIN — SODIUM CHLORIDE 100 ML/HR: 9 INJECTION, SOLUTION INTRAVENOUS at 08:25

## 2017-09-14 RX ADMIN — IPRATROPIUM BROMIDE AND ALBUTEROL SULFATE 1 AMPULE: .5; 3 SOLUTION RESPIRATORY (INHALATION) at 08:59

## 2017-09-14 RX ADMIN — PIPERACILLIN SODIUM,TAZOBACTAM SODIUM 3.38 G: 3; .375 INJECTION, POWDER, FOR SOLUTION INTRAVENOUS at 23:41

## 2017-09-14 RX ADMIN — HYDROCORTISONE SODIUM SUCCINATE 200 MG: 100 INJECTION, POWDER, FOR SOLUTION INTRAMUSCULAR; INTRAVENOUS at 05:34

## 2017-09-14 RX ADMIN — ACETYLCYSTEINE 1200 MG: 200 SOLUTION ORAL; RESPIRATORY (INHALATION) at 05:28

## 2017-09-14 RX ADMIN — PIPERACILLIN SODIUM,TAZOBACTAM SODIUM 3.38 G: 3; .375 INJECTION, POWDER, FOR SOLUTION INTRAVENOUS at 15:40

## 2017-09-14 RX ADMIN — PIPERACILLIN SODIUM,TAZOBACTAM SODIUM 3.38 G: 3; .375 INJECTION, POWDER, FOR SOLUTION INTRAVENOUS at 05:43

## 2017-09-14 RX ADMIN — PANTOPRAZOLE SODIUM 40 MG: 40 TABLET, DELAYED RELEASE ORAL at 05:32

## 2017-09-14 RX ADMIN — ACETYLCYSTEINE 1200 MG: 200 SOLUTION ORAL; RESPIRATORY (INHALATION) at 17:15

## 2017-09-14 RX ADMIN — HYDRALAZINE HYDROCHLORIDE 25 MG: 25 TABLET, FILM COATED ORAL at 21:03

## 2017-09-14 RX ADMIN — INSULIN GLARGINE 30 UNITS: 100 INJECTION, SOLUTION SUBCUTANEOUS at 08:30

## 2017-09-14 RX ADMIN — HYDRALAZINE HYDROCHLORIDE 25 MG: 25 TABLET, FILM COATED ORAL at 14:30

## 2017-09-14 RX ADMIN — IPRATROPIUM BROMIDE AND ALBUTEROL SULFATE 1 AMPULE: .5; 3 SOLUTION RESPIRATORY (INHALATION) at 16:02

## 2017-09-14 RX ADMIN — HYDRALAZINE HYDROCHLORIDE 25 MG: 25 TABLET, FILM COATED ORAL at 08:21

## 2017-09-14 RX ADMIN — INSULIN GLARGINE 30 UNITS: 100 INJECTION, SOLUTION SUBCUTANEOUS at 21:04

## 2017-09-14 RX ADMIN — Medication 1 TABLET: at 08:21

## 2017-09-14 RX ADMIN — IPRATROPIUM BROMIDE AND ALBUTEROL SULFATE 1 AMPULE: .5; 3 SOLUTION RESPIRATORY (INHALATION) at 13:03

## 2017-09-14 RX ADMIN — Medication 10 ML: at 21:04

## 2017-09-14 RX ADMIN — LEVOTHYROXINE SODIUM 25 MCG: 25 TABLET ORAL at 05:32

## 2017-09-14 RX ADMIN — Medication 500 MG: at 08:21

## 2017-09-14 RX ADMIN — CARVEDILOL 37.5 MG: 25 TABLET, FILM COATED ORAL at 08:00

## 2017-09-14 ASSESSMENT — PAIN SCALES - GENERAL
PAINLEVEL_OUTOF10: 0

## 2017-09-14 NOTE — PROGRESS NOTES
[facility]   Nephrology progress note  9/14/2017, 12:50 PM      Pt Name:    Larissa Chang  MRN:     [id number]     [place]osa [place]:    1950  Admit Date:    9/7/2017 11:53 AM  Primary Care Physician:  Nathaly Torres MD   Room number  3A-12/012-A    Chief Complaint: Nephrology following for HARDY/CKD    Subjective:  Patient seen and examined earlier during rounds  Pt denies any chest pain or shortness of breath  Had heart cath earlier today      Objective:  24HR INTAKE/OUTPUT:      Intake/Output Summary (Last 24 hours) at 09/14/17 1250  Last data filed at 09/14/17 0548   Gross per 24 hour   Intake            946.4 ml   Output             1700 ml   Net           -753.6 ml     I/O last 3 completed shifts: In: 946.4 [I.V.:946.4]  Out: 2400 [Urine:2400]     Admission weight: 174 lb 2.6 oz (79 kg)  Wt Readings from Last 3 Encounters:   09/14/17 194 lb 14.2 oz (88.4 kg)   08/09/17 177 lb 3.2 oz (80.4 kg)   07/20/17 195 lb 12.8 oz (88.8 kg)     Body mass index is 28.78 kg/(m^2).     Physical examination  VITALS:     Vitals:    09/14/17 1205   BP: 135/72   Pulse: 79   Resp:    Temp: 98.1 °F (36.7 °C)   SpO2:      General Appearance: awake, alert, comfortable, no distress  Mouth/Throat: moist  Neck: No JVD  Lungs: Air entry B/L, decreased at bases, no rales, no wheeze  Heart:  S1, S2 heard  GI: soft, no distention, no abd wall edema  Extremities: no sig LE edema      Lab Data  CBC:   Recent Labs      09/12/17   0516  09/13/17   0330  09/14/17   0330   WBC  10.2  10.1  7.3   HGB  9.4*  9.6*  9.3*   HCT  28.0*  28.9*  27.0*   PLT  294  306  354     BMP:  Recent Labs      09/11/17   2211  09/12/17   0516  09/13/17   0330  09/14/17   0330   NA   --   144  142  139   K  3.6  3.6  4.6  4.2   CL   --   104  104  102   CO2   --   24  28  28   BUN   --   27*  17  15   CREATININE   --   1.8*  1.5*  1.4*   GLUCOSE   --   203*  111*  237*   CALCIUM   --   8.2*  8.5  8.9   MG  1.8  2.1  1.9  1.9   PHOS  2.5  3.1   -- --      Hepatic:   Recent Labs      09/13/17   0330   LABALBU  2.8*   AST  16   ALT  18   BILITOT  0.3   ALKPHOS  75         Meds:  Infusion:    dextrose       Meds:    sodium chloride flush  10 mL Intravenous 2 times per day    hydrALAZINE  25 mg Oral TID    acetylcysteine  1,200 mg Oral BID    potassium (CARDIAC) replacement protocol   Other RX Placeholder    folbee plus  1 tablet Oral Daily    vitamin C  500 mg Oral Daily    insulin lispro  5-17 Units Subcutaneous TID WC    ipratropium-albuterol  1 ampule Inhalation Q4H WA    pantoprazole  40 mg Oral QAM AC    insulin glargine  30 Units Subcutaneous BID    piperacillin-tazobactam  3.375 g Intravenous Q8H    phosphorus replacement protocol   Other RX Placeholder    calcium replacement protocol   Other RX Placeholder    levothyroxine  25 mcg Oral Daily    pravastatin  80 mg Oral Daily    aspirin  81 mg Oral Daily     Meds prn: ALPRAZolam, diphenhydrAMINE, nitroGLYCERIN, sodium chloride flush, acetaminophen, atropine, magnesium hydroxide, ondansetron, menthol, melatonin, diphenhydrAMINE, labetalol, HYDROcodone 5 mg - acetaminophen **OR** HYDROcodone 5 mg - acetaminophen, dextrose, magnesium sulfate, glucose, glucagon (rDNA), dextrose       Impression and Plan:  1. HARDY on CKd III:  HARDY in setting of respiratory decompensation in presence of hypotension/sepsis and recent diuresis  - HARDY appears to have resolved. Will monitor. Recheck labs in am  - currently stable clinically  - No NSAIDs, No ACEI or ARB for now  - BMP in am  - continue with mucomyst for now    2. Acute respiratory failure with pulmonary edema and acute on systolic heart failure  - s/p extubation, EF 35-40% with grade I diastolic dysfunction  - will resume diuretics tomorrow    3. Hypertension: stable at this time on hydralazine  - will consider adding ACEI or ARB if renal function remains stable    4. Fever/sepsis: on IV abx, ID following  5.  Acute on chronic systolic heart failure with low EF/Grade I diastolic dysfunction: okay to give diuretics as needed, clinically appearing stable  6. NSTEMI: had cardiac cath, await procedure report  7. Hx DM  8.  COPD    D/W patient and RN    Liliane Cobb MD  [facility]

## 2017-09-14 NOTE — FLOWSHEET NOTE
1300 Mr Lyndon Elise tolerates PO well. He was assisted to ambulate in the room to the [facility]. He tolerates this well. Able to have a large BM. Remains up in the chair. 1400 He continues to tolerate PO well. His family have gone for the day. 1700 Dr Anabelle Zarate in to see. Will sign off for now. Keep follow up appt with him.

## 2017-09-14 NOTE — PROGRESS NOTES
Infectious Diseases Progress Note  Pt Name: Aleja Martin  MRN: [id number]  YOB: 1950  Admit Date: 9/7/2017 11:53 AM  Date of evaluation: 9/14/2017  Primary Care Physician: Senia Bradshaw MD   3A-12/012-A   68-year-old male with a history of problems with COPD and problems with mediastinal lymphadenopathy. The patient essentially is noted to have problems with coronary artery disease and also problems with having lung nodules and he is a nonsmoker, admitted to the hospital on 09/07/2017. The patient ended up getting intubated on 09/11/2017 and a chest x-ray showed pulmonary edema. The patient is noted to have bilateral lung infiltrates and problems with fever. Sputum culture was done on  09/11/2017 showing growth of gram positive cocci. The patient is more alert, awake, and oriented today than  otherwise no responding to commands. noted to have temperature of 103. Subjective: Interval History:  Patient sitting up in bed and results of cath noted. Pt/Chart review last 24 hours:  Fever no, Diarrhea No, Dyspnea No,     Objective:      Vitals: BP (!) 168/88  Pulse 79  Temp 97.3 °F (36.3 °C) (Oral)   Resp 16  Ht 5' 9\" (1.753 m)  Wt 194 lb 14.2 oz (88.4 kg)  SpO2 97%  BMI 28.78 kg/m2   HEENT: Head: Normal, normocephalic, atraumatic.   Heart: regular rate and rhythm  Lungs: clear to auscultation bilaterally  Abdomen: soft, non-tender; bowel sounds normal; no masses,  no organomegaly  Extremities: extremities normal, atraumatic, no cyanosis or edema  Neurologic: Mental status: alertness: alert     Data:   Current ATBs: vanco 9/12-9/14 zosyn 9/11  Scheduled Meds:   sodium chloride flush  10 mL Intravenous 2 times per day    hydrALAZINE  25 mg Oral TID    acetylcysteine  1,200 mg Oral BID    potassium (CARDIAC) replacement protocol   Other RX Placeholder    folbee plus  1 tablet Oral Daily    vitamin C  500 mg Oral Daily    insulin lispro  5-17 Units Subcutaneous TID    

## 2017-09-14 NOTE — CONSULTS
[address] [place] [address]                                 CONSULTATION    PATIENT NAME: Akanksha Esteban                      :             1950  MED REC NO:   [id number]                            ROOM:            0012  ACCOUNT NO:   [de-identified]                            ADMISSION DATE:  2017  PROVIDER:    SOSA Hitchcock:  2017    REASON FOR CONSULT:  Evaluation and treatment of the patient for fever  and sepsis. HISTORY OF PRESENT ILLNESS:  The patient is a nice 68-year-old male  with a history of problems with COPD and problems with mediastinal  lymphadenopathy. The patient essentially is noted to have problems  with coronary artery disease and also problems with having lung  nodules and he is a nonsmoker, admitted to the hospital on 2017. The patient ended up getting intubated on 2017 and a chest x-ray  showed pulmonary edema. The patient is noted to have bilateral lung  infiltrates and problems with fever. Sputum culture was done on  2017 showing growth of gram positive cocci. Final culture is  pending. The patient is more alert, awake, and oriented today than  otherwise no responding to commands. The patient is seen at 8 o'clock  and delayed dictation being done at 3 o'clock when the patient also  now is noted to have temperature of 103. PAST MEDICAL HISTORY:  Significant for chronic kidney disease, COPD,  hyperlipidemia, hypertension, lung nodule, lymphadenopathy, and  diabetes mellitus type 2. PAST SURGICAL HISTORY:  Includes history of carpal tunnel surgery,  coronary angioplasty with stent placement, and inguinal hernia repair. FAMILY HISTORY:  Significant for coronary artery disease and colon  cancer. SOCIAL HISTORY:  Former smoker.     CURRENT MEDICATIONS:  The patient currently is on aspirin, Bumex,  Coreg, Apresoline, Lantus, Humalog, Synthroid, Duoneb, Cozaar, and  Protonix. Zosyn thus was started 09/11/2017 and Pravachol, and  vancomycin. ALLERGIES:  AMLODIPINE. REVIEW OF SYSTEMS:  Significant for respiratory issues as detailed  earlier. PHYSICAL EXAMINATION:  VITAL SIGNS:  Blood pressure 162/84, pulse 59, temperature 94.1, T-max  100.1, but at this time it is 102. The patient has had episode of  hypotension yesterday at 79/55. HEENT:  Head is atraumatic, normocephalic. Pupils are equal and  reactive to light. NECK:  Supple. No lymphadenopathy. No ulcers noted in mouth or  throat. LUNGS:  Have bilateral rales and wheezing. ABDOMEN:  Soft, nontender, bowel sounds positive. EXTREMITIES:  No pedal edema. CNS:  There is no gross deficits. The patient is more alert, awake,  oriented than before. LABORATORY DATA:  BUN is 27, creatinine is 1.8. Hemoglobin A1c 9.2  and WBC 10.2, hemoglobin 9.4. The patient's CA 19-9 is elevated that  was done 07/22/2017. RADIOLOGICAL STUDIES:  On 07/11/2017 the patient's CAT scan of the  abdomen is unremarkable. DIAGNOSTIC IMPRESSION:  1. Severe sepsis. 2.  Bilateral pneumonia. 3.  Acute respiratory failure. 4.  History of cholecystitis. 5.  Fever 102 degrees Fahrenheit. 6.  Elevated CA 19-9, etiology needs to be further evaluated. 7.  Diabetes mellitus type 2 uncontrolled. 8.  COPD. PLAN:  The patient has had dysfunctioning gallbladder that needs to be  further worked up. I will add Cipro to the current regimen and get an  ultrasound of the liver and gallbladder and pancreas and fungal panel. Care of plan and course of treatment discussed with the nursing staff.         Robson Feliz M.D.    D: 09/12/2017 15:14:08       T: 09/13/2017 19:05:02     [email or web address]  Job#: [id number]     Doc#: [id number]

## 2017-09-14 NOTE — PLAN OF CARE
Problem: RESPIRATORY  Goal: Lung sounds clear or within normal limits for patient  Outcome: Ongoing  Improved lung sounds post Tx. Will continue Txs as scheduled.

## 2017-09-14 NOTE — PLAN OF CARE
Problem: Pain:  Goal: Pain level will decrease  Pain level will decrease   Outcome: Ongoing  Goal: Control of acute pain  Control of acute pain   Outcome: Ongoing    Problem: Cardiovascular  Goal: No DVT, peripheral vascular complications  Outcome: Ongoing  Goal: Hemodynamic stability  Outcome: Ongoing  Remains hemodynamically stable.     Problem: Respiratory  Goal: O2 Sat > 90%  Outcome: Ongoing    Problem: Skin Integrity/Risk  Goal: No skin breakdown during hospitalization  Outcome: Ongoing    Problem: Musculor/Skeletal Functional Status  Goal: Absence of falls  Outcome: Ongoing

## 2017-09-14 NOTE — OP NOTE
[address] [address]  Sedation/Analgesia Post Sedation Record      Pt Name: Edwina Chavis  MRN: [id number]  YOB: 1950  Procedure Performed By: Tera Nolan MD  Primary Care Physician: Mozell Boas, MD    POST-PROCEDURE    Physician: Tera Nolan MD    Procedure Performed:  Left Heart Cath    Sedation/Anesthesia: iv sedation and local anasthesia  Estimated Blood Loss:  Minimal    Specimens Removed:  None    Complications:  None     Post Procedure Diagnosis/Findings:  Coronary Artery Disease    Recommendations:  Medical treatment and review films.        Tera Nolan MD  Electronically signed 9/14/2017 at 7:42 AM

## 2017-09-14 NOTE — PLAN OF CARE
Problem: RESPIRATORY  Goal: Lung sounds clear or within normal limits for patient  Outcome: Ongoing  Continue therapy as ordered to improve breath sounds/aeration.

## 2017-09-14 NOTE — PROCEDURES
EKG was handed to Dr Edmond Antoine.
EKG was handed to Dr. Reymundo Zimmerman.
patient will need to  be on beta blockers, ACE inhibitors and diuretics. Further  recommendations pending the clinical course of the patient.         Colleen Hodgson M.D.    D: 09/14/2017 8:32:25       T: 09/14/2017 11:49:50     [email or web address]  Job#: [id number]     Doc#: [id number]    CC:  Referring Service

## 2017-09-14 NOTE — PROGRESS NOTES
Inpatient Cardiac Rehabilitation Consult    Received consult for Phase II Cardiac Rehabilitation. Patient does not meet qualifications for cardiac rehabilitation.

## 2017-09-14 NOTE — PLAN OF CARE
Problem: Pain:  Goal: Pain level will decrease  Pain level will decrease   Outcome: Ongoing  Patient is not currently complaining of pain. Goal: Control of acute pain  Control of acute pain   Outcome: Ongoing  Patient is not currently complaining of pain    Problem: Cardiovascular  Goal: No DVT, peripheral vascular complications  Outcome: Met This Shift  No new signs or symptoms of DVT at this time. Goal: Hemodynamic stability  Outcome: Ongoing  Patient is hypertensive at times. Problem: Respiratory  Goal: O2 Sat > 90%  Outcome: Ongoing  Patient is on room air during the day, however, he wears a cpap at night    Problem: Skin Integrity/Risk  Goal: No skin breakdown during hospitalization  Outcome: Met This Shift  No new signs or symptoms of skin breakdown present at this time. Problem: Musculor/Skeletal Functional Status  Goal: Absence of falls  Outcome: Met This Shift  Patient has been free of falls. Problem: Discharge Planning:  Goal: Discharged to appropriate level of care  Discharged to appropriate level of care   Outcome: Ongoing  Patient remains in CCU at this time. Problem: Nutrition  Goal: Optimal nutrition therapy  Outcome: Met This Shift  Patient is eating and tolerating all of his food. Problem: Infection - Central Venous Catheter-Associated Bloodstream Infection:  Goal: Will show no infection signs and symptoms  Will show no infection signs and symptoms   Outcome: Ongoing  Patient has been afebrile this shift.

## 2017-09-14 NOTE — PROGRESS NOTES
[address]   Sedation/Analgesia History and Physical    Pt Name: Charlotte Rutledge  MRN: [id number]  YOB: 1950  Provider Performing Procedure: Cristy Carl MD  Primary Care Physician: Mechelle Medina MD      Pre-Procedure: {CARDIAC SYMPTOMS:[id number]::\"Chest pain,  coronary artery disease,acute exacerbation of chf , nstemi    Consent: I have discussed with the patient and/or the patient representative the indication, alternatives, and the possible risks and/or complications of the planned procedure and the anesthesia methods. The patient and/or representative appear to understand and agree to proceed. Medical History:   has a past medical history of Gonsalves's esophagus; CAD (coronary artery disease); Chronic combined systolic (congestive) and diastolic (congestive) heart failure ([facility] [address] Chronic systolic heart failure (HCC); CKD (chronic kidney disease); COPD (chronic obstructive pulmonary disease) ([facility] [address] Hyperlipidemia; Hypertension; Hypothyroid; Lung nodule; Lymphadenopathy; Osteoarthritis; Sleep apnea; Tobacco abuse; and Type II or unspecified type diabetes mellitus without mention of complication, not stated as uncontrolled. .    Surgical History:     has a past surgical history that includes Coronary angioplasty with stent; shoulder surgery; Carpal tunnel release; Elbow surgery; Rotator cuff repair; knee surgery (Left); Inguinal hernia repair (Bilateral); Cataract removal (Bilateral, 2012, 2016); Colonoscopy (2006,2010,2016); Upper gastrointestinal endoscopy (2006,2007,2010,2012, 2016); and egd transoral biopsy single/multiple (Left, 7/20/2017). Allergies:    Allergies as of 09/07/2017 - Review Complete 09/07/2017   Allergen Reaction Noted    Amlodipine Itching and Swelling 05/24/2016       Medications:   Coumadin use last 5 days:  No  Antiplatelet drug therapy use last 5 days:  Yes  Other anticoagulant use last 5 days:  No   hydrALAZINE  25 mg Oral TID    carvedilol  37.5 mg Inhale 2 puffs into the lungs every 6 hours as needed for Wheezing 1/31/17  Yes Jessica Doan MD   SPIRIVA HANDIHALER 18 MCG inhalation capsule INHALE 1 CAPSULE INTO THE LUNGS DAILY 1/19/17  Yes Jessica Doan MD   Exenatide (BYDUREON) 2 MG PEN Inject 2 mg into the skin once a week Takes on Sunday    Yes Historical Provider, MD   acetaminophen (TYLENOL) 325 MG tablet Take 650 mg by mouth every 6 hours as needed for Pain   Yes Historical Provider, MD   pravastatin (PRAVACHOL) 80 MG tablet Take 1 tablet by mouth daily 9/1/16  Yes Vinny Hein MD   omeprazole (PRILOSEC) 20 MG capsule Take 1 capsule by mouth every morning (before breakfast) 7/14/16  Yes BLANKA Browning   Multiple Vitamin (MULTI VITAMIN MENS PO) Take 1 tablet by mouth daily    Yes Historical Provider, MD   hydrALAZINE (APRESOLINE) 10 MG tablet Take 10 mg by mouth 3 times daily. Yes Historical Provider, MD   aspirin 81 MG EC tablet Take 81 mg by mouth daily. Yes Historical Provider, MD   glucose monitoring kit (FREESTYLE) monitoring kit Need one glucometer and 100 testing strips for 3-4 times per day testing.   Diagnosis is diabetes type 2 7/23/17   [place] Bran, DO   Lancets MISC 1 each by Does not apply route daily 7/23/17   [place] Castro, DO   Insulin Syringe-Needle U-100 (INSULIN SYRINGE .3CC/31GX5/16\") 31G X 5/16\" 0.3 ML MISC 1 each by Does not apply route daily 7/23/17   July Castro, DO   CPAP Machine MISC by Does not apply route Please change CPAP pressure to 13 cm H20. 1/6/17   Jessica Doan MD       Vital Signs  Vitals:    09/14/17 0332   BP:    Pulse:    Resp:    Temp: 98.2 °F (36.8 °C)   SpO2:        Physical:  Heart:  regular rate and rhythm  Lungs:  Clear  Abdomen:  Soft  Mental Status:  Alert and Oriented    Planned Procedure:  Left Heart Cath and Possible Percutaneous Coronary Intervention    Sedation/ Anesthesia Plan: Midazolam and Sublimaze    ASA Classification: Class 3 - A patient with severe systemic disease that limits activity but is not incapacitating    Mallampati Airway Classification: II (soft palate, uvula, fauces visible)    · Pre-procedure diagnostic studies complete and results available. · Previous sedation/anesthesia experiences assessed. · The patient is an appropriate candidate to undergo the planned procedure sedation and anesthesia. (Refer to nursing sedation/analgesia documentation record)  · Formulation and discussion of sedation/procedure plan, risks, and expectations with patient and/or responsible adult completed. · Patient examined immediately prior to the procedure.  (Refer to nursing sedation/analgesia documentation record)    Cassi Og MD  Electronically signed 9/14/2017 at 3:51 AM  Patient Name: Valentin Florian   Medical Record Number: [id number]  Date: 9/14/2017   Time: 3:51 AM   Room/Bed: [address] [place] [address]

## 2017-09-14 NOTE — FLOWSHEET NOTE
0800 Mr Mara Morrow has returned from the cath lab. The right groin site is clear. He is very sleepy. Wearing O2 at 2 liters. He denies pain or SOB.    0900 The right groin site remains clear. He continues to deny pain. His family are at the bedside. 1100 He tolerates PO well. Dr Keshav Bridges in to see.

## 2017-09-14 NOTE — PROGRESS NOTES
[facility]   Nephrology progress note  9/14/2017, 12:39 PM      Pt Name:    Kaylyn Torres  MRN:     [id number]     [place]:    1950  Admit Date:    9/7/2017 11:53 AM  Primary Care Physician:  Anita Huitron MD   Room number  3A-12/012-A    Chief Complaint: Nephrology following for HARDY/CKD    Subjective:  Patient seen and examined earlier during rounds  Pt denies any chest pain or shortness of breath  Had heart cath earlier today      Objective:  24HR INTAKE/OUTPUT:      Intake/Output Summary (Last 24 hours) at 09/14/17 1239  Last data filed at 09/14/17 0548   Gross per 24 hour   Intake            946.4 ml   Output             1700 ml   Net           -753.6 ml     I/O last 3 completed shifts: In: 946.4 [I.V.:946.4]  Out: 2400 [Urine:2400]     Admission weight: 174 lb 2.6 oz (79 kg)  Wt Readings from Last 3 Encounters:   09/14/17 194 lb 14.2 oz (88.4 kg)   08/09/17 177 lb 3.2 oz (80.4 kg)   07/20/17 195 lb 12.8 oz (88.8 kg)     Body mass index is 28.78 kg/(m^2).     Physical examination  VITALS:     Vitals:    09/14/17 1205   BP: 135/72   Pulse: 79   Resp:    Temp: 98.1 °F (36.7 °C)   SpO2:      General Appearance: awake, alert, comfortable, no distress  Mouth/Throat: moist  Neck: No JVD  Lungs: Air entry B/L, decreased at bases, no rales, no wheeze  Heart:  S1, S2 heard  GI: soft, no distention, no abd wall edema  Extremities: no sig LE edema      Lab Data  CBC:   Recent Labs      09/12/17   0516  09/13/17   0330  09/14/17   0330   WBC  10.2  10.1  7.3   HGB  9.4*  9.6*  9.3*   HCT  28.0*  28.9*  27.0*   PLT  294  306  354     BMP:  Recent Labs      09/11/17   2211  09/12/17   0516  09/13/17   0330  09/14/17   0330   NA   --   144  142  139   K  3.6  3.6  4.6  4.2   CL   --   104  104  102   CO2   --   24  28  28   BUN   --   27*  17  15   CREATININE   --   1.8*  1.5*  1.4*   GLUCOSE   --   203*  111*  237*   CALCIUM   --   8.2*  8.5  8.9   MG  1.8  2.1  1.9  1.9   PHOS  2.5  3.1   -- --      Hepatic:   Recent Labs      09/13/17   0330   LABALBU  2.8*   AST  16   ALT  18   BILITOT  0.3   ALKPHOS  75         Meds:  Infusion:    dextrose       Meds:    sodium chloride flush  10 mL Intravenous 2 times per day    hydrALAZINE  25 mg Oral TID    acetylcysteine  1,200 mg Oral BID    potassium (CARDIAC) replacement protocol   Other RX Placeholder    folbee plus  1 tablet Oral Daily    vitamin C  500 mg Oral Daily    insulin lispro  5-17 Units Subcutaneous TID WC    ipratropium-albuterol  1 ampule Inhalation Q4H WA    pantoprazole  40 mg Oral QAM AC    insulin glargine  30 Units Subcutaneous BID    piperacillin-tazobactam  3.375 g Intravenous Q8H    phosphorus replacement protocol   Other RX Placeholder    calcium replacement protocol   Other RX Placeholder    levothyroxine  25 mcg Oral Daily    pravastatin  80 mg Oral Daily    aspirin  81 mg Oral Daily     Meds prn: ALPRAZolam, diphenhydrAMINE, nitroGLYCERIN, sodium chloride flush, acetaminophen, atropine, magnesium hydroxide, ondansetron, menthol, melatonin, diphenhydrAMINE, labetalol, HYDROcodone 5 mg - acetaminophen **OR** HYDROcodone 5 mg - acetaminophen, dextrose, magnesium sulfate, glucose, glucagon (rDNA), dextrose       Impression and Plan:  1. HARDY on CKd III:  HARDY in setting of respiratory decompensation in presence of hypotension/sepsis and recent diuresis  - HARDY appears to have resolved. Will monitor. Recheck labs in am  - currently stable clinically  - No NSAIDs, No ACEI or ARB for now  - BMP in am  - continue with mucomyst for now    2. Acute respiratory failure with pulmonary edema and acute on systolic heart failure  - s/p extubation, EF 35-40% with grade I diastolic dysfunction    3. Hypertension: stable at this time on hydralazine  - will consider adding ACEI or ARB if renal function remains stable    4. Fever/sepsis: on IV abx, ID following  5.  Acute on chronic systolic heart failure with low EF/Grade I diastolic dysfunction: okay to give diuretics as needed, clinically appearing stable  6. NSTEMI: had cardiac cath, await procedure report  7. Hx DM  8.  COPD    D/W patient and RN    Rehana Montiel MD  [facility]

## 2017-09-14 NOTE — PROGRESS NOTES
Hospitalist Progress Note    Patient:  Payton [place]      Unit/Bed:3A-12/012-A    YOB: 1950    MRN: [id number]       Acct: [de-identified]     PCP: Ashley Lopez MD    Date of Admission: 9/7/2017    Chief Complaint: Chest pain    Hospital Course: Admitted with chest pain with known CAD and EF 20%; intubated with pulmonary edema after fluid treatment prior to potential cath   9/12/17 - When initially seen is still intubated but awake, looking forward to tube coming out and getting heart cath as soon as possible   9/13/17 - Doing well, no breathing issues    Subjective: 9/14/17 - Continues to feel well and without complaint when seen.       Medications:  Reviewed    Infusion Medications    dextrose       Scheduled Medications    sodium chloride flush  10 mL Intravenous 2 times per day    hydrALAZINE  25 mg Oral TID    acetylcysteine  1,200 mg Oral BID    potassium (CARDIAC) replacement protocol   Other RX Placeholder    folbee plus  1 tablet Oral Daily    vitamin C  500 mg Oral Daily    insulin lispro  5-17 Units Subcutaneous TID WC    ipratropium-albuterol  1 ampule Inhalation Q4H WA    pantoprazole  40 mg Oral QAM AC    insulin glargine  30 Units Subcutaneous BID    piperacillin-tazobactam  3.375 g Intravenous Q8H    phosphorus replacement protocol   Other RX Placeholder    calcium replacement protocol   Other RX Placeholder    levothyroxine  25 mcg Oral Daily    pravastatin  80 mg Oral Daily    aspirin  81 mg Oral Daily     PRN Meds: ALPRAZolam, diphenhydrAMINE, nitroGLYCERIN, sodium chloride flush, acetaminophen, atropine, magnesium hydroxide, ondansetron, menthol, melatonin, diphenhydrAMINE, labetalol, HYDROcodone 5 mg - acetaminophen **OR** HYDROcodone 5 mg - acetaminophen, dextrose, magnesium sulfate, glucose, glucagon (rDNA), dextrose      Intake/Output Summary (Last 24 hours) at 09/14/17 1718  Last data filed at 09/14/17 0548   Gross per 24 hour   Intake            592.4 ml electronically signed by Dr. Anthony Mcknight on 9/13/2017 8:52 AM      RADIOLOGY REPORT   Final Result      XR Chest Portable   Final Result   1. New right-sided PICC line with tip in the right atrium. 2.  Stable bilateral mostly lower lobe infiltrates. 3.  Stable position of the NG tube and endotracheal tube. **This report has been created using voice recognition software. It may contain minor errors which are inherent in voice recognition technology. **      Final report electronically signed by Dr. Sravanthi Rapp on 9/12/2017 10:36 AM      XR Chest Portable   Final Result   1. Borderline heart size. ET tube has been inserted with its tip 4 cm proximal to antonia. 2. Hazy appearance both lungs relatively diffusely, consistent with pneumonia/pulmonary edema. No effusion is seen. **This report has been created using voice recognition software. It may contain minor errors which are inherent in voice recognition technology. **      Final report electronically signed by Dr. Heather Tolbert on 9/11/2017 8:54 AM      XR Chest Portable   Final Result   1. Appropriately positioned lines and tubes. 2. Persistent bilateral pulmonary infiltrates. This may represent pulmonary edema. 3. Small bilateral pleural effusions. **This report has been created using voice recognition software. It may contain minor errors which are inherent in voice recognition technology. **      Final report electronically signed by Dr. Dariela Preciado on 9/11/2017 7:11 AM      XR Chest Portable   Final Result   No acute findings, stable from the previous study of 7/18/2017. **This report has been created using voice recognition software. It may contain minor errors which are inherent in voice recognition technology. **      Final report electronically signed by Dr. Stoney Forrest on 9/7/2017 12:43 PM          Diet: DIET CARDIAC; Carb Control: 4 carbs/meal (approximate 1800 kcals/day);  Low Sodium (2 GM); Daily Fluid Restriction: 2000 ml    DVT prophylaxis: [] Lovenox                                 [x] SCDs                                 [] SQ Heparin                                 [] Encourage ambulation           [] Already on Anticoagulation     Disposition:    [x] Home       [] TCU       [] Rehab       [] Psych       [] SNF       [] [place]       [] Other-    Code Status: Full Code    PT/OT Eval Status: no    Assessment/Plan:    Anticipated Discharge in : 1 day    Active Hospital Problems    Diagnosis Date Noted    ALANA (obstructive sleep apnea) [G47.33] 05/02/2014     Priority: High    Other acute kidney failure ([facility] [address] [N17.8]     [facility] [J96.02] 09/11/2017    Pulmonary edema cardiac cause [facility] [I50.1] 09/11/2017    Hyperkalemia [E87.5]     Leukocytosis, unspecified [D72.829]     Normocytic anemia [D64.9]     Elevated lipase [R74.8]     Chest pain [R07.9]     Diabetes mellitus due to underlying condition with stage 3 chronic kidney disease, with long-term current use of insulin (HCC) [E08.22, N18.3, Z79.4]     Hyperglycemia [R73.9]     CKD (chronic kidney disease), stage III [N18.3]     Acute on chronic combined systolic (congestive) and diastolic (congestive) heart failure (HCC) [I50.43]     Anemia of chronic kidney failure [N18.9, D63.1]     Type 2 diabetes mellitus with hyperglycemia, with long-term current use of insulin ([facility]) [E11.65, Z79.4] 08/26/2016    Shortness of breath [R06.02] 08/26/2016    Essential hypertension [I10] 08/26/2016    Chronic obstructive pulmonary disease ([facility] [address] [J44.9]     Mediastinal lymphadenopathy [R59.0]     Lung nodule [R91.1] 03/09/2012       Pulm edema, hypercapn/hypox resp failure, ALANA  · Extubated on 9/12 without issue, doing well on NC O2 when seen  · Continue nightly CPAP at 13cm H2O  · Duonebs  · Monitor fluid balance, nephrology on the case  · 9/14/17 - Possibly add ACEI tomorrow if renal

## 2017-09-14 NOTE — PROGRESS NOTES
[facility]    Patient - Corona Hernandez   MRN -  [id number]   [facility]   - 1950      Date of Admission -  2017 11:53 AM  Date of evaluation -  2017  3A-12/012-A   Consulting - Emily Wolff DO  Primary Care Physician - Berlin Gonzalez, [facility] Day - 7  Chief Complaint   Management of hypercapnic respiratory failure.   Active Hospital Problem List      Active Hospital Problems    Diagnosis Date Noted    ALANA (obstructive sleep apnea) [G47.33] 2014     Priority: High    Other acute kidney failure (HCC) [N17.8]     Acute hypercapnic respiratory failure (HCC) [J96.02] 2017    Pulmonary edema cardiac cause ([facility] [I50.1] 2017    Hyperkalemia [E87.5]     Leukocytosis, unspecified [D72.829]     Normocytic anemia [D64.9]     Elevated lipase [R74.8]     Chest pain [R07.9]     Diabetes mellitus due to underlying condition with stage 3 chronic kidney disease, with long-term current use of insulin (HCC) [E08.22, N18.3, Z79.4]     Hyperglycemia [R73.9]     CKD (chronic kidney disease), stage III [N18.3]     Acute on chronic combined systolic (congestive) and diastolic (congestive) heart failure (HCC) [I50.43]     Anemia of chronic kidney failure [N18.9, D63.1]     Type 2 diabetes mellitus with hyperglycemia, with long-term current use of insulin (HCC) [E11.65, Z79.4] 2016    Shortness of breath [R06.02] 2016    Essential hypertension [I10] 2016    Chronic obstructive pulmonary disease ([facility] [J44.9]     Mediastinal lymphadenopathy [R59.0]     Lung nodule [R91.1] 2012     HPI   The patient is a 69 y.o. male with significant past medical history of severe COPD and ALANA compliant with cpap, stable lung nodule, mediastinal lymphadenopathy (followed by Dr. Myriam Mitchell), CAD with chronic systolic/diastolic HF, DM, HTN, CKD and remote nicotine dependence who presented to the hospital on 17 with chest pain. The plan was to proceed with [facility] today, however he rapidly decompensated over night with pulmonary edema/NSTEMI resulting in hypercapnic/hypoxic respiratory failure requiring intubation. Follow up ABG revealed pH 7.24 PCO2 63 HC03 247 P02 450 on AC 14, , PEEP 5. Rate was increased to 18 and VT to 550 with repeat pH 7.39 and C02 41 P02 81 with PEEP 5 and 40% FI02. CXR showed pulmonary edema with bilateral pulmonary infiltrates. WBC climbed to 12.2 and is now febrile with TM of 102.7. Although likely discrepancy in scales, his weight is up 22 lbs and nearly 5 Liters ahead on fluid. We are asked to evaluate for critical care/ventilator management. At the time of my evaluation he is sedate on the above vent settings. Procalcitonin is 0.28. He has not responded to IV diuresis, he remains afebrile and SBP 80's. 9/12: Extubated    Past 24 hour events   On room air. No shortness of breath  Using CPAP at night.  -Rest of the body systems were reviewed.      Meds      sodium chloride flush  10 mL Intravenous 2 times per day    hydrALAZINE  25 mg Oral TID    acetylcysteine  1,200 mg Oral BID    potassium (CARDIAC) replacement protocol   Other RX Placeholder    folbee plus  1 tablet Oral Daily    vitamin C  500 mg Oral Daily    insulin lispro  5-17 Units Subcutaneous TID WC    ipratropium-albuterol  1 ampule Inhalation Q4H WA    pantoprazole  40 mg Oral QAM AC    insulin glargine  30 Units Subcutaneous BID    piperacillin-tazobactam  3.375 g Intravenous Q8H    phosphorus replacement protocol   Other RX Placeholder    calcium replacement protocol   Other RX Placeholder    levothyroxine  25 mcg Oral Daily    pravastatin  80 mg Oral Daily    aspirin  81 mg Oral Daily     PRN  ALPRAZolam, diphenhydrAMINE, nitroGLYCERIN, sodium chloride flush, acetaminophen, atropine, magnesium hydroxide, ondansetron, menthol, melatonin, diphenhydrAMINE, labetalol, HYDROcodone 5 mg - acetaminophen **OR** Shift Total  (mL/kg) 384.7  (4.4)   384.7  (4.4)   O  U  T  P  U  T   Urine  (mL/kg/hr) 300  (0.4)   300    Shift Total  (mL/kg) 300  (3.4)   300  (3.4)   Weight (kg) 88.4 88.4 88.4 88.4     Patient Vitals for the past 96 hrs (Last 3 readings):   Weight   09/14/17 0347 194 lb 14.2 oz (88.4 kg)   09/13/17 0456 202 lb 9.6 oz (91.9 kg)   09/12/17 0400 196 lb 3.4 oz (89 kg)     Lines, ET tube, Devices, Drains   PICC  Jordan  Exam   Constitutional: Patient appears in no distress. Mouth/Throat: Oropharynx is clear and moist.  No oral thrush. Neck: Neck supple. Cardiovascular: S1 and S2 heard. No murmurs. Pulmonary/Chest: Bilateral air entry present. Good breath sounds on both sides, diminished at bases. No wheezes. Fine basal rales. Abdominal: Soft. No tenderness. Extremities: Patient exhibits no edema. Neurological: Patient is awake and alert.   Labs   ABG  Lab Results   Component Value Date    PH 7.39 09/11/2017    PO2 81 09/11/2017    PCO2 41 09/11/2017    HCO3 25 09/11/2017    O2SAT 96 09/11/2017     Lab Results   Component Value Date    IFIO2 30 09/12/2017    MODE AC 09/12/2017    SETTIDVOL 550 09/12/2017    SETPEEP 5.0 09/12/2017     CBC  Recent Labs      09/12/17   0516  09/13/17   0330  09/14/17   0330   WBC  10.2  10.1  7.3   RBC  3.28*  3.35*  3.16*   HGB  9.4*  9.6*  9.3*   HCT  28.0*  28.9*  27.0*   MCV  85.6  86.3  85.6   MCH  28.8  28.7  29.3   MCHC  33.6  33.2  34.2   RDW  14.3  13.6  13.7   PLT  294  306  354   MPV  8.1  7.9  7.8      BMP  Recent Labs      09/11/17   2211  09/12/17   0516  09/13/17   0330  09/14/17   0330   NA   --   144  142  139   K  3.6  3.6  4.6  4.2   CL   --   104  104  102   CO2   --   24  28  28   BUN   --   27*  17  15   CREATININE   --   1.8*  1.5*  1.4*   GLUCOSE   --   203*  111*  237*   MG  1.8  2.1  1.9  1.9   PHOS  2.5  3.1   --    --    CALCIUM   --   8.2*  8.5  8.9     LFT  Recent Labs      09/13/17   0330   AST  16   ALT  18   BILITOT  0.3   ALKPHOS  75 TROP  Lab Results   Component Value Date    TROPONINT < 0.010 09/12/2017    TROPONINT 0.046 09/11/2017    TROPONINT < 0.010 09/11/2017   BNP: 6987    PTT  Recent Labs      09/13/17   0938  09/13/17   2130  09/14/17   0330   APTT  71.3*  58.5*  80.7*     Glucose  Recent Labs      09/14/17   0829  09/14/17   1135  09/14/17   1646   POCGLU  167*  191*  230*     UA   No results for input(s): SPECGRAV, PHUR, COLORU, CLARITYU, MUCUS, PROTEINU, BLOODU, RBCUA, WBCUA, BACTERIA, NITRU, GLUCOSEU, BILIRUBINUR, UROBILINOGEN, KETUA, LABCAST, LABCASTTY, AMORPHOS in the last 72 hours. Invalid input(s): CRYSTALS. EKG 9/11/17     Normal sinus rhythm  Low voltage QRS, consider pulmonary disease, pericardial effusion, or normal variant  Cannot rule out Anteroseptal infarct (cited on or before 26-FEB-2004)  Abnormal ECG  When compared with ECG of 11-SEP-2017 02:08,  No significant change was found  Echo 9/8/17   Summary   Technically difficult examination.   This is a suboptimal study due to poor echocardiographic window.   Doppler parameters were consistent with abnormal left ventricular   relaxation (grade 1 diastolic dysfunction).   Left ventricle size is normal.   Normal left ventricular wall thickness.   Ejection fraction is visually estimated in the range of 35% to 40%.   Unable to determine wall motion abnormalities due to poor image quality.   Doppler parameters were consistent with abnormal left ventricular   relaxation (grade 1 diastolic dysfunction)     Mitral Valve   Structurally normal mitral valve.   Normal mobility of both mitral valve leaflets.   No evidence of mitral valve stenosis.   Mild mitral regurgitation is present.      Aortic Valve   Aortic valve appears tricuspid. Aortic valve leaflets are somewhat   thickened.  No evidence of aortic valve regurgitation .      Tricuspid Valve   Tricuspid valve is structurally normal.   Mild tricuspid regurgitation visualized.      Pulmonic Valve   The pulmonic valve was not well visualized .      Left Atrium   Normal size left atrium.      Left Ventricle   Left ventricle size is normal.   Normal left ventricular wall thickness.   Ejection fracti/on is visually estimated in the range of 35% to 40%.   Unable to determine wall motion abnormalities due to poor image quality.   Doppler parameters were consistent with abnormal left ventricular   relaxation (grade 1 diastolic dysfunction).      Right Atrium   The right atrium is of normal size.      Right Ventricle   Normal right ventricular size and function.   Right ventricle global systolic function is normal .   Pacer Wire visualized in right ventricle.      Pericardial Effusion   No evidence of any pericardial effusion.   Fat pad present.      Pleural Effusion   No evidence of pleural effusion. Cultures  Blood cultures x 2 9/11  Urine culture    Procalcitonin   Lab Results   Component Value Date    PROCAL 0.47 09/13/2017    PROCAL 0.86 09/12/2017    PROCAL 0.28 09/11/2017     PFT            Radiology   CXR      Sep 13, 2017  PROCEDURE: XR CHEST STANDARD TWO VW  1. Normal heart size. Right arm PICC line tip in right atrium. 2. Small effusion left side. Mild pneumonia/pulmonary edema left perihilar region and both lung bases. 3. Overall appearance of chest improved from prior. CXR 9/12   1.  New right-sided PICC line with tip in the right atrium. 2.  Stable bilateral mostly lower lobe infiltrates. 3.  Stable position of the NG tube and endotracheal tube. CT Scans    CT chest 1/23/17  C  Lung volumes are satisfactory, improved, with interval resolution of the previously demonstrated bilateral pleural effusions also evident.       There is no evidence of consolidation. No evidence of infiltrate.       Partially mineralized nodule of the right upper lobe is stable.  No evidence of interval development of additional nodule/mass.       There is no pneumothorax or pleural effusion currently demonstrated.       To the limits of coverage  Social/Spiritual/DNR/Miscellaneous   Full code  Continue PT/OT   OK for stepdown    Case discussed with nurse Mr. Yaakov Prather and patient. Questions and concerns addressed. Meds and Orders reviewed. -Follow with my ( Dr. Jose Galvez) clinic at [place] for pulmonary medicine by keeping scheduled appt on 9/26/17 with chest Xray PA and lateral views 2days before clinic visit to follow pneumonia.  -Will sign off on the case from pulmonary point of view. -Will follow PRN. -Call [phone] with questions.       Electronically signed by   Ailyn Yen MD on 9/14/2017 at 5:35 PM

## 2017-09-15 LAB
ANION GAP SERPL CALCULATED.3IONS-SCNC: 12 MEQ/L (ref 8–16)
BUN BLDV-MCNC: 20 MG/DL (ref 7–22)
CALCIUM SERPL-MCNC: 9.2 MG/DL (ref 8.5–10.5)
CHLORIDE BLD-SCNC: 107 MEQ/L (ref 98–111)
CO2: 28 MEQ/L (ref 23–33)
CREAT SERPL-MCNC: 1.4 MG/DL (ref 0.4–1.2)
GFR SERPL CREATININE-BSD FRML MDRD: 50 ML/MIN/1.73M2
GLUCOSE BLD-MCNC: 126 MG/DL (ref 70–108)
GLUCOSE BLD-MCNC: 140 MG/DL (ref 70–108)
GLUCOSE BLD-MCNC: 64 MG/DL (ref 70–108)
GLUCOSE BLD-MCNC: 69 MG/DL (ref 70–108)
GLUCOSE BLD-MCNC: 92 MG/DL (ref 70–108)
MAGNESIUM: 2 MG/DL (ref 1.6–2.4)
POTASSIUM SERPL-SCNC: 3.6 MEQ/L (ref 3.5–5.2)
SODIUM BLD-SCNC: 147 MEQ/L (ref 135–145)

## 2017-09-15 PROCEDURE — 2140000000 HC CCU INTERMEDIATE R&B

## 2017-09-15 PROCEDURE — 82948 REAGENT STRIP/BLOOD GLUCOSE: CPT

## 2017-09-15 PROCEDURE — 2580000003 HC RX 258: Performed by: INTERNAL MEDICINE

## 2017-09-15 PROCEDURE — 80048 BASIC METABOLIC PNL TOTAL CA: CPT

## 2017-09-15 PROCEDURE — 99232 SBSQ HOSP IP/OBS MODERATE 35: CPT | Performed by: INTERNAL MEDICINE

## 2017-09-15 PROCEDURE — 6360000002 HC RX W HCPCS: Performed by: INTERNAL MEDICINE

## 2017-09-15 PROCEDURE — 6370000000 HC RX 637 (ALT 250 FOR IP): Performed by: NURSE PRACTITIONER

## 2017-09-15 PROCEDURE — 6370000000 HC RX 637 (ALT 250 FOR IP)

## 2017-09-15 PROCEDURE — 6370000000 HC RX 637 (ALT 250 FOR IP): Performed by: INTERNAL MEDICINE

## 2017-09-15 PROCEDURE — 97110 THERAPEUTIC EXERCISES: CPT

## 2017-09-15 PROCEDURE — 94640 AIRWAY INHALATION TREATMENT: CPT

## 2017-09-15 PROCEDURE — 83735 ASSAY OF MAGNESIUM: CPT

## 2017-09-15 PROCEDURE — 97116 GAIT TRAINING THERAPY: CPT

## 2017-09-15 PROCEDURE — 36415 COLL VENOUS BLD VENIPUNCTURE: CPT

## 2017-09-15 RX ORDER — BUMETANIDE 1 MG/1
1 TABLET ORAL DAILY
Status: DISCONTINUED | OUTPATIENT
Start: 2017-09-15 | End: 2017-09-15

## 2017-09-15 RX ORDER — BUMETANIDE 1 MG/1
1 TABLET ORAL DAILY
Status: DISCONTINUED | OUTPATIENT
Start: 2017-09-16 | End: 2017-09-15

## 2017-09-15 RX ORDER — POTASSIUM CHLORIDE 750 MG/1
20 TABLET, FILM COATED, EXTENDED RELEASE ORAL ONCE
Status: COMPLETED | OUTPATIENT
Start: 2017-09-15 | End: 2017-09-15

## 2017-09-15 RX ORDER — LISINOPRIL 5 MG/1
5 TABLET ORAL DAILY
Status: DISCONTINUED | OUTPATIENT
Start: 2017-09-15 | End: 2017-09-17 | Stop reason: HOSPADM

## 2017-09-15 RX ORDER — BUMETANIDE 1 MG/1
1 TABLET ORAL 2 TIMES DAILY
Status: DISCONTINUED | OUTPATIENT
Start: 2017-09-15 | End: 2017-09-17 | Stop reason: HOSPADM

## 2017-09-15 RX ADMIN — POTASSIUM CHLORIDE 20 MEQ: 750 TABLET, FILM COATED, EXTENDED RELEASE ORAL at 08:43

## 2017-09-15 RX ADMIN — PANTOPRAZOLE SODIUM 40 MG: 40 TABLET, DELAYED RELEASE ORAL at 06:32

## 2017-09-15 RX ADMIN — IPRATROPIUM BROMIDE AND ALBUTEROL SULFATE 1 AMPULE: .5; 3 SOLUTION RESPIRATORY (INHALATION) at 21:51

## 2017-09-15 RX ADMIN — IPRATROPIUM BROMIDE AND ALBUTEROL SULFATE 1 AMPULE: .5; 3 SOLUTION RESPIRATORY (INHALATION) at 11:45

## 2017-09-15 RX ADMIN — PIPERACILLIN SODIUM,TAZOBACTAM SODIUM 3.38 G: 3; .375 INJECTION, POWDER, FOR SOLUTION INTRAVENOUS at 14:51

## 2017-09-15 RX ADMIN — LABETALOL HYDROCHLORIDE 10 MG: 5 INJECTION, SOLUTION INTRAVENOUS at 22:33

## 2017-09-15 RX ADMIN — LABETALOL HYDROCHLORIDE 10 MG: 5 INJECTION, SOLUTION INTRAVENOUS at 00:18

## 2017-09-15 RX ADMIN — IPRATROPIUM BROMIDE AND ALBUTEROL SULFATE 1 AMPULE: .5; 3 SOLUTION RESPIRATORY (INHALATION) at 07:35

## 2017-09-15 RX ADMIN — INSULIN GLARGINE 30 UNITS: 100 INJECTION, SOLUTION SUBCUTANEOUS at 21:10

## 2017-09-15 RX ADMIN — ACETYLCYSTEINE 1200 MG: 200 SOLUTION ORAL; RESPIRATORY (INHALATION) at 06:32

## 2017-09-15 RX ADMIN — PRAVASTATIN SODIUM 80 MG: 80 TABLET ORAL at 21:09

## 2017-09-15 RX ADMIN — Medication 10 ML: at 21:10

## 2017-09-15 RX ADMIN — LISINOPRIL 5 MG: 5 TABLET ORAL at 18:15

## 2017-09-15 RX ADMIN — ACETAMINOPHEN 650 MG: 325 TABLET ORAL at 04:39

## 2017-09-15 RX ADMIN — HYDRALAZINE HYDROCHLORIDE 25 MG: 25 TABLET, FILM COATED ORAL at 21:09

## 2017-09-15 RX ADMIN — IPRATROPIUM BROMIDE AND ALBUTEROL SULFATE 1 AMPULE: .5; 3 SOLUTION RESPIRATORY (INHALATION) at 16:04

## 2017-09-15 RX ADMIN — BUMETANIDE 1 MG: 1 TABLET ORAL at 18:15

## 2017-09-15 RX ADMIN — PIPERACILLIN SODIUM,TAZOBACTAM SODIUM 3.38 G: 3; .375 INJECTION, POWDER, FOR SOLUTION INTRAVENOUS at 06:32

## 2017-09-15 RX ADMIN — LABETALOL HYDROCHLORIDE 10 MG: 5 INJECTION, SOLUTION INTRAVENOUS at 04:31

## 2017-09-15 RX ADMIN — HYDRALAZINE HYDROCHLORIDE 25 MG: 25 TABLET, FILM COATED ORAL at 14:49

## 2017-09-15 RX ADMIN — CARVEDILOL 37.5 MG: 25 TABLET, FILM COATED ORAL at 14:49

## 2017-09-15 RX ADMIN — LEVOTHYROXINE SODIUM 25 MCG: 25 TABLET ORAL at 06:32

## 2017-09-15 RX ADMIN — INSULIN GLARGINE 30 UNITS: 100 INJECTION, SOLUTION SUBCUTANEOUS at 08:43

## 2017-09-15 RX ADMIN — Medication 3 MG: at 22:33

## 2017-09-15 RX ADMIN — ASPIRIN 81 MG: 81 TABLET, CHEWABLE ORAL at 08:42

## 2017-09-15 RX ADMIN — Medication 10 ML: at 11:33

## 2017-09-15 RX ADMIN — Medication 10 ML: at 22:34

## 2017-09-15 RX ADMIN — Medication 1 TABLET: at 08:42

## 2017-09-15 RX ADMIN — HYDRALAZINE HYDROCHLORIDE 25 MG: 25 TABLET, FILM COATED ORAL at 08:42

## 2017-09-15 RX ADMIN — Medication 500 MG: at 08:42

## 2017-09-15 ASSESSMENT — PAIN DESCRIPTION - LOCATION: LOCATION: HEAD

## 2017-09-15 ASSESSMENT — PAIN DESCRIPTION - DESCRIPTORS: DESCRIPTORS: ACHING

## 2017-09-15 ASSESSMENT — PAIN SCALES - GENERAL
PAINLEVEL_OUTOF10: 0
PAINLEVEL_OUTOF10: 4
PAINLEVEL_OUTOF10: 0
PAINLEVEL_OUTOF10: 0

## 2017-09-15 ASSESSMENT — PAIN DESCRIPTION - PAIN TYPE: TYPE: ACUTE PAIN

## 2017-09-15 NOTE — FLOWSHEET NOTE
1300 Mr Carolyn Jefferson continues to rest up in the chair. Dr Tao Mention called about restarting his Coreg. Will restart this. Dr Gonzalez Latoya in to see. Will transfer to [place] if bed available. 1500 He was assisted to ambulate in the [place]. He tolerates this well with min assist.    1700 He remains up in the chair. Has had a large solid BM. Denies pain or SOB. 1800 He was assisted to ambulate to the waiting room to visit with his family. Will transfer to .

## 2017-09-15 NOTE — PLAN OF CARE
Problem: RESPIRATORY  Goal: Lung sounds clear or within normal limits for patient  Outcome: Met This Shift

## 2017-09-15 NOTE — FLOWSHEET NOTE
0800 Mr Wendy Parra rests comfortably in the bed. He denies pain or SOB. He continues to have a productive cough. Tolerates PO well, tolerates his meds well.    0900 His right groin site remains clear. Dr Aysha Ludwig in to see. He will restart some diuretics. 1100 PT in to see.  Mr Wendy Parra tolerates therapy well with mod assist.

## 2017-09-15 NOTE — PLAN OF CARE
Problem: RESPIRATORY  Goal: Lung sounds clear or within normal limits for patient  Outcome: Met This Shift  Pt had no questions on purpose or side effects of medicaiton. Will continue with txs to help improve aeration t/o lungs. Lungs clear at this time.

## 2017-09-15 NOTE — PROGRESS NOTES
Score  Intervention List: Patient able to continue with treatment  Comments / Details: L hamstring, improved with stretching and gait    Social/Functional:  Lives With: Spouse  Type of Home: House  Home Layout: Two level, One level  Home Access: Stairs to enter without rails  Entrance Stairs - Number of Steps: 2  Home Equipment: Rolling walker     Objective:  Supine to Sit: Supervision    Transfers  Sit to Stand: Stand by assistance  Stand to sit: Stand by assistance     Ambulation 1  Surface: level tile  Device: No Device  Assistance: Contact guard assistance  Quality of Gait: Pt amb with decreased siri, decreased stride length, impaired push off. Distance: 250 feet  Comments: Slightly unsteady with increased fatigue, moderate SOB after gait. Balance  Sitting - Static: Good  Standing - Static: Fair;+  Standing - Dynamic: Fair    Vitals after gait while seated in recliner ~2 mins after gait: /86,   Vitals after gait while seated in recliner ~5 mins after gait: /83,     Exercises:  Exercises  Comments: Pt performs supine B LE AROM and seated edge of bed B LAQ AROM x 15 reps to increase strength for improved gait and balance. L hamstring stretches 3 x 30 seconds to minimize pain, pt reports feels better after stretching. Activity Tolerance:  Activity Tolerance: Patient limited by endurance; Patient limited by fatigue    Assessment: Body structures, Functions, Activity limitations: Decreased functional mobility , Decreased balance, Decreased endurance, Decreased strength  Assessment: Pt tolerates session fair, pt with increase in BP and HR after gait training with moderate SOB, however, improving with seated rest break. Nurse aware, will continue to progress as pt able.   Prognosis: Excellent  REQUIRES PT FOLLOW UP: Yes  Discharge Recommendations: Patient would benefit from continued therapy after discharge, Continue to assess pending progress    Patient Education:  Patient Education: Ther ex, hamstring stretches, transfer training, gait. Equipment Recommendations:  Equipment Needed: No  Other: Will continue to monitor needs. Safety:  Type of devices: Call light within reach, Chair alarm in place, Left in chair, Nurse notified, Gait belt, Patient at risk for falls  Restraints  Initially in place: No    Plan:  Times per week: 3-5 X GM  Times per day: Daily  Specific instructions for Next Treatment: B LE strengthening, transfer training, bed mobility, gait training, stair training, HEP. Current Treatment Recommendations: Strengthening, Transfer Training, Endurance Training, Neuromuscular Re-education, Balance Training, Gait Training, Stair training, Functional Mobility Training, Home Exercise Program    Goals:  Patient goals : To go home. Short term goals  Time Frame for Short term goals: 6-7 days  Short term goal 1: Pt to transfer supine <--> sit SBA to enable pt to get in/out of bed. Short term goal 2: Pt to transfer sit <--> stand SBA for increased functional mobility. Short term goal 3: Pt to ambulate 200 feet with/without AD SBA for household ambulation. Short term goal 4: Pt to ascend/descend 2 steps without HR CGA to enable pt to get in/out of home. Long term goals  Time Frame for Long term goals : NA due to short length of stay.

## 2017-09-15 NOTE — PROGRESS NOTES
09/15/17 1645   Gross per 24 hour   Intake             2417 ml   Output             1350 ml   Net             1067 ml       Diet:  DIET CARDIAC; Carb Control: 4 carbs/meal (approximate 1800 kcals/day); Low Sodium (2 GM); Daily Fluid Restriction: 2000 ml    Exam:  BP (!) 169/92  Pulse 89  Temp 98.4 °F (36.9 °C) (Oral)   Resp 24  Ht 5' 9\" (1.753 m)  Wt 198 lb 3.1 oz (89.9 kg)  SpO2 96%  BMI 29.27 kg/m2    General appearance: No apparent distress, appears stated age and cooperative. HEENT: Pupils equal, round, and reactive to light. Conjunctivae/corneas clear. Neck: Supple, with full range of motion. No jugular venous distention. Trachea midline. Respiratory:  Normal respiratory effort. Clear to auscultation, bilaterally without Rales/Wheezes/Rhonchi. Cardiovascular: Regular rate and rhythm with normal S1/S2 without murmurs, rubs or gallops. Abdomen: Soft, non-tender, non-distended with normal bowel sounds. Musculoskeletal: No clubbing, cyanosis or edema bilaterally. Full range of motion without deformity. Skin: Skin color, texture, turgor normal.  No rashes or lesions. Neurologic:  Neurovascularly intact without any focal sensory/motor deficits. Cranial nerves: grossly intact  Psychiatric: Alert and oriented, thought content appropriate, normal insight  Capillary Refill: Brisk,< 3 seconds   Peripheral Pulses: +2 palpable, equal bilaterally       Labs:   Recent Labs      09/13/17   0330  09/14/17   0330   WBC  10.1  7.3   HGB  9.6*  9.3*   HCT  28.9*  27.0*   PLT  306  354     Recent Labs      09/13/17   0330  09/14/17   0330  09/15/17   0430   NA  142  139  147*   K  4.6  4.2  3.6   CL  104  102  107   CO2  28  28  28   BUN  17  15  20   CREATININE  1.5*  1.4*  1.4*   CALCIUM  8.5  8.9  9.2     Recent Labs      09/13/17   0330   AST  16   ALT  18   BILITOT  0.3   ALKPHOS  75     No results for input(s): INR in the last 72 hours.   No results for input(s): Londell Handler in the last 72 RADIOLOGY REPORT   Final Result      XR Chest Portable   Final Result   1. New right-sided PICC line with tip in the right atrium. 2.  Stable bilateral mostly lower lobe infiltrates. 3.  Stable position of the NG tube and endotracheal tube. **This report has been created using voice recognition software. It may contain minor errors which are inherent in voice recognition technology. **      Final report electronically signed by Dr. Nhi Dimas on 9/12/2017 10:36 AM      XR Chest Portable   Final Result   1. Borderline heart size. ET tube has been inserted with its tip 4 cm proximal to antonia. 2. Hazy appearance both lungs relatively diffusely, consistent with pneumonia/pulmonary edema. No effusion is seen. **This report has been created using voice recognition software. It may contain minor errors which are inherent in voice recognition technology. **      Final report electronically signed by Dr. Darwin Trinh on 9/11/2017 8:54 AM      XR Chest Portable   Final Result   1. Appropriately positioned lines and tubes. 2. Persistent bilateral pulmonary infiltrates. This may represent pulmonary edema. 3. Small bilateral pleural effusions. **This report has been created using voice recognition software. It may contain minor errors which are inherent in voice recognition technology. **      Final report electronically signed by Dr. Emi Mojica on 9/11/2017 7:11 AM      XR Chest Portable   Final Result   No acute findings, stable from the previous study of 7/18/2017. **This report has been created using voice recognition software. It may contain minor errors which are inherent in voice recognition technology. **      Final report electronically signed by Dr. Audra Blake on 9/7/2017 12:43 PM      XR Chest Standard TWO VW    (Results Pending)       Diet: DIET CARDIAC; Carb Control: 4 carbs/meal (approximate 1800 kcals/day); Low Sodium (2 GM);  Daily Fluid Restriction: 2000 ml    DVT prophylaxis: [] Lovenox                                 [x] SCDs                                 [] SQ Heparin                                 [] Encourage ambulation           [] Already on Anticoagulation     Disposition:    [x] Home       [] TCU       [] Rehab       [] Psych       [] SNF       [] [place]       [] Other-    Code Status: Full Code    PT/OT Eval Status: no    Assessment/Plan:    Anticipated Discharge in : 1 day    Active Hospital Problems    Diagnosis Date Noted    ALANA (obstructive sleep apnea) [G47.33] 05/02/2014     Priority: High    Other acute kidney failure ([facility] [N17.8]     [facility] [J96.02] 09/11/2017    Pulmonary edema cardiac cause ([facility] [I50.1] 09/11/2017    Hyperkalemia [E87.5]     Leukocytosis, unspecified [D72.829]     Normocytic anemia [D64.9]     Elevated lipase [R74.8]     Chest pain [R07.9]     Diabetes mellitus due to underlying condition with stage 3 chronic kidney disease, with long-term current use of insulin (HCC) [E08.22, N18.3, Z79.4]     Hyperglycemia [R73.9]     CKD (chronic kidney disease), stage III [N18.3]     Acute on chronic combined systolic (congestive) and diastolic (congestive) heart failure (HCC) [I50.43]     Anemia of chronic kidney failure [N18.9, D63.1]     Type 2 diabetes mellitus with hyperglycemia, with long-term current use of insulin (HCC) [E11.65, Z79.4] 08/26/2016    Shortness of breath [R06.02] 08/26/2016    Essential hypertension [I10] 08/26/2016    Chronic obstructive pulmonary disease ([facility] [J44.9]     Mediastinal lymphadenopathy [R59.0]     Lung nodule [R91.1] 03/09/2012       Pulm edema, hypercapn/hypox resp failure, ALANA  · Extubated on 9/12 without issue, doing well on NC O2 when seen  · Continue nightly CPAP at 13cm H2O  · Duonebs  · Monitor fluid balance, nephrology on the case  · 9/14/17 - Possibly add ACEI tomorrow if renal function stable    NSTEMI  · Cath planned Thursday  · 9/14/17 - medical treatment, no stents    HARDY  · Nephro on the case, holding diuretics at this time   · Mucomyst BID for linda-cath renal protection  · 9/14/17 - Stop IVF, restart diuretics tomorrow if renal function okay    9/15/17 - Back on Bumex, on Coreg; monitoring BP and increasing activity.   Cardiology wants patient to remain here one more night and hopefully discharge tomorrow    Electronically signed by Perri Ortiz DO on 9/15/2017 at 6:37 PM

## 2017-09-15 NOTE — PROGRESS NOTES
Infectious Diseases Progress Note  Pt Name: Savannah Doyle  MRN: [id number]  YOB: 1950  Admit Date: 9/7/2017 11:53 AM  Date of evaluation: 9/15/2017  Primary Care Physician: Tati Dietrich MD   3A-12/012-A   20-year-old male with a history of problems with COPD and problems with mediastinal lymphadenopathy. The patient essentially is noted to have problems with coronary artery disease and also problems with having lung nodules and he is a nonsmoker, admitted to the hospital on 09/07/2017. The patient ended up getting intubated on 09/11/2017 and a chest x-ray showed pulmonary edema. The patient is noted to have bilateral lung infiltrates and problems with fever. Sputum culture was done on  09/11/2017 showing growth of gram positive cocci. The patient is more alert, awake, and oriented today than  otherwise no responding to commands. noted to have temperature of 103. Subjective: Interval History:  Patient sitting up in bed and results of cath noted. Pt/Chart review last 24 hours:  Fever no, Diarrhea No, Dyspnea No,     Objective:      Vitals: BP (!) 156/80  Pulse 86  Temp 97.6 °F (36.4 °C) (Oral)   Resp 16  Ht 5' 9\" (1.753 m)  Wt 198 lb 3.1 oz (89.9 kg)  SpO2 96%  BMI 29.27 kg/m2   HEENT: Head: Normal, normocephalic, atraumatic.   Heart: regular rate and rhythm  Lungs: clear to auscultation bilaterally  Abdomen: soft, non-tender; bowel sounds normal; no masses,  no organomegaly  Extremities: extremities normal, atraumatic, no cyanosis or edema  Neurologic: Mental status: alertness: alert     Data:   Current ATBs: vanco 9/12-9/14 zosyn 9/11  Scheduled Meds:   sodium chloride flush  10 mL Intravenous 2 times per day    hydrALAZINE  25 mg Oral TID    potassium (CARDIAC) replacement protocol   Other RX Placeholder    folbee plus  1 tablet Oral Daily    vitamin C  500 mg Oral Daily    insulin lispro  5-17 Units Subcutaneous TID     ipratropium-albuterol  1 ampule Inhalation Q4H WA

## 2017-09-15 NOTE — PROGRESS NOTES
[facility] [address]  OCCUPATIONAL THERAPY MISSED TREATMENT NOTE  ACUTE CARE    Date: 9/15/2017  Patient Name: Valentin Florian        CSN: [id number]   : 1950  (69 y.o.)  Gender: male   Referring Practitioner: Franklin Dhillon CNP  Diagnosis: Other Chest Pain         REASON FOR MISSED TREATMENT:  Missed Treat.

## 2017-09-16 VITALS
OXYGEN SATURATION: 95 % | RESPIRATION RATE: 18 BRPM | DIASTOLIC BLOOD PRESSURE: 85 MMHG | HEIGHT: 69 IN | WEIGHT: 192.9 LBS | BODY MASS INDEX: 28.57 KG/M2 | TEMPERATURE: 97.7 F | HEART RATE: 96 BPM | SYSTOLIC BLOOD PRESSURE: 137 MMHG

## 2017-09-16 LAB
ANION GAP SERPL CALCULATED.3IONS-SCNC: 13 MEQ/L (ref 8–16)
BUN BLDV-MCNC: 18 MG/DL (ref 7–22)
CALCIUM SERPL-MCNC: 8.6 MG/DL (ref 8.5–10.5)
CHLORIDE BLD-SCNC: 107 MEQ/L (ref 98–111)
CO2: 27 MEQ/L (ref 23–33)
CREAT SERPL-MCNC: 1.3 MG/DL (ref 0.4–1.2)
GFR SERPL CREATININE-BSD FRML MDRD: 55 ML/MIN/1.73M2
GLUCOSE BLD-MCNC: 111 MG/DL (ref 70–108)
GLUCOSE BLD-MCNC: 121 MG/DL (ref 70–108)
GLUCOSE BLD-MCNC: 188 MG/DL (ref 70–108)
GLUCOSE BLD-MCNC: 98 MG/DL (ref 70–108)
MAGNESIUM: 1.8 MG/DL (ref 1.6–2.4)
POTASSIUM SERPL-SCNC: 3.8 MEQ/L (ref 3.5–5.2)
SODIUM BLD-SCNC: 147 MEQ/L (ref 135–145)

## 2017-09-16 PROCEDURE — 6370000000 HC RX 637 (ALT 250 FOR IP): Performed by: NURSE PRACTITIONER

## 2017-09-16 PROCEDURE — 6370000000 HC RX 637 (ALT 250 FOR IP): Performed by: INTERNAL MEDICINE

## 2017-09-16 PROCEDURE — 80048 BASIC METABOLIC PNL TOTAL CA: CPT

## 2017-09-16 PROCEDURE — 2140000000 HC CCU INTERMEDIATE R&B

## 2017-09-16 PROCEDURE — 2580000003 HC RX 258: Performed by: INTERNAL MEDICINE

## 2017-09-16 PROCEDURE — 99238 HOSP IP/OBS DSCHRG MGMT 30/<: CPT | Performed by: INTERNAL MEDICINE

## 2017-09-16 PROCEDURE — 94640 AIRWAY INHALATION TREATMENT: CPT

## 2017-09-16 PROCEDURE — 6360000002 HC RX W HCPCS: Performed by: INTERNAL MEDICINE

## 2017-09-16 PROCEDURE — 36415 COLL VENOUS BLD VENIPUNCTURE: CPT

## 2017-09-16 PROCEDURE — 99232 SBSQ HOSP IP/OBS MODERATE 35: CPT | Performed by: INTERNAL MEDICINE

## 2017-09-16 PROCEDURE — 83735 ASSAY OF MAGNESIUM: CPT

## 2017-09-16 PROCEDURE — 82948 REAGENT STRIP/BLOOD GLUCOSE: CPT

## 2017-09-16 RX ORDER — CARVEDILOL 25 MG/1
37.5 TABLET ORAL 2 TIMES DAILY WITH MEALS
Qty: 90 TABLET | Refills: 0 | Status: SHIPPED | OUTPATIENT
Start: 2017-09-16

## 2017-09-16 RX ORDER — LISINOPRIL 5 MG/1
5 TABLET ORAL DAILY
Qty: 30 TABLET | Refills: 0 | Status: SHIPPED | OUTPATIENT
Start: 2017-09-16 | End: 2018-04-14

## 2017-09-16 RX ORDER — AMOXICILLIN AND CLAVULANATE POTASSIUM 500; 125 MG/1; MG/1
1 TABLET, FILM COATED ORAL 3 TIMES DAILY
Qty: 30 TABLET | Refills: 0 | Status: SHIPPED | OUTPATIENT
Start: 2017-09-16 | End: 2017-09-26

## 2017-09-16 RX ORDER — HYDRALAZINE HYDROCHLORIDE 25 MG/1
25 TABLET, FILM COATED ORAL 3 TIMES DAILY
Qty: 90 TABLET | Refills: 0 | Status: SHIPPED | OUTPATIENT
Start: 2017-09-16

## 2017-09-16 RX ADMIN — PIPERACILLIN SODIUM,TAZOBACTAM SODIUM 3.38 G: 3; .375 INJECTION, POWDER, FOR SOLUTION INTRAVENOUS at 06:30

## 2017-09-16 RX ADMIN — IPRATROPIUM BROMIDE AND ALBUTEROL SULFATE 1 AMPULE: .5; 3 SOLUTION RESPIRATORY (INHALATION) at 16:47

## 2017-09-16 RX ADMIN — INSULIN GLARGINE 30 UNITS: 100 INJECTION, SOLUTION SUBCUTANEOUS at 08:54

## 2017-09-16 RX ADMIN — PIPERACILLIN SODIUM,TAZOBACTAM SODIUM 3.38 G: 3; .375 INJECTION, POWDER, FOR SOLUTION INTRAVENOUS at 00:43

## 2017-09-16 RX ADMIN — LEVOTHYROXINE SODIUM 25 MCG: 25 TABLET ORAL at 06:31

## 2017-09-16 RX ADMIN — ASPIRIN 81 MG: 81 TABLET, CHEWABLE ORAL at 08:54

## 2017-09-16 RX ADMIN — HYDRALAZINE HYDROCHLORIDE 25 MG: 25 TABLET, FILM COATED ORAL at 08:54

## 2017-09-16 RX ADMIN — Medication 1 TABLET: at 08:56

## 2017-09-16 RX ADMIN — PANTOPRAZOLE SODIUM 40 MG: 40 TABLET, DELAYED RELEASE ORAL at 06:31

## 2017-09-16 RX ADMIN — BUMETANIDE 1 MG: 1 TABLET ORAL at 08:53

## 2017-09-16 RX ADMIN — HYDRALAZINE HYDROCHLORIDE 25 MG: 25 TABLET, FILM COATED ORAL at 13:18

## 2017-09-16 RX ADMIN — LISINOPRIL 5 MG: 5 TABLET ORAL at 08:53

## 2017-09-16 RX ADMIN — CARVEDILOL 37.5 MG: 25 TABLET, FILM COATED ORAL at 16:57

## 2017-09-16 RX ADMIN — PIPERACILLIN SODIUM,TAZOBACTAM SODIUM 3.38 G: 3; .375 INJECTION, POWDER, FOR SOLUTION INTRAVENOUS at 15:29

## 2017-09-16 RX ADMIN — CARVEDILOL 37.5 MG: 25 TABLET, FILM COATED ORAL at 08:53

## 2017-09-16 RX ADMIN — Medication 10 ML: at 08:52

## 2017-09-16 RX ADMIN — Medication 500 MG: at 08:54

## 2017-09-16 ASSESSMENT — PAIN SCALES - GENERAL: PAINLEVEL_OUTOF10: 0

## 2017-09-16 NOTE — PROGRESS NOTES
Infectious Diseases Progress Note  Pt Name: Kaylyn Torres  MRN: [id number]  YOB: 1950  Admit Date: 9/7/2017 11:53 AM  Date of evaluation: 9/16/2017  Primary Care Physician: Anita Huitron MD   [id number]   56-year-old male with a history of problems with COPD and problems with mediastinal lymphadenopathy. The patient essentially is noted to have problems with coronary artery disease and also problems with having lung nodules and he is a nonsmoker, admitted to the hospital on 09/07/2017. The patient ended up getting intubated on 09/11/2017 and a chest x-ray showed pulmonary edema. The patient is noted to have bilateral lung infiltrates and problems with fever. Sputum culture was done on  09/11/2017 showing growth of gram positive cocci. The patient is more alert, awake, and oriented today than  otherwise no responding to commands. noted to have temperature of 103. Subjective: Interval History:  Patient sitting up in bed and results of cath noted. Pt/Chart review last 24 hours:  Fever no, Diarrhea No, Dyspnea No,     Objective:      Vitals: BP (!) 177/86  Pulse 90  Temp 98.4 °F (36.9 °C) (Oral)   Resp 16  Ht 5' 9\" (1.753 m)  Wt 192 lb 14.4 oz (87.5 kg)  SpO2 93%  BMI 28.49 kg/m2   HEENT: Head: Normal, normocephalic, atraumatic.   Heart: regular rate and rhythm  Lungs: clear to auscultation bilaterally  Abdomen: soft, non-tender; bowel sounds normal; no masses,  no organomegaly  Extremities: extremities normal, atraumatic, no cyanosis or edema  Neurologic: Mental status: alertness: alert     Data:   Current ATBs: vanco 9/12-9/14 zosyn 9/11  Scheduled Meds:   carvedilol  37.5 mg Oral BID WC    lisinopril  5 mg Oral Daily    bumetanide  1 mg Oral BID    sodium chloride flush  10 mL Intravenous 2 times per day    hydrALAZINE  25 mg Oral TID    potassium (CARDIAC) replacement protocol   Other RX Placeholder    folbee plus  1 tablet Oral Daily    vitamin C  500 mg Oral Daily    insulin lispro  5-17 Units Subcutaneous TID WC    ipratropium-albuterol  1 ampule Inhalation Q4H WA    pantoprazole  40 mg Oral QAM AC    insulin glargine  30 Units Subcutaneous BID    piperacillin-tazobactam  3.375 g Intravenous Q8H    phosphorus replacement protocol   Other RX Placeholder    calcium replacement protocol   Other RX Placeholder    levothyroxine  25 mcg Oral Daily    pravastatin  80 mg Oral Daily    aspirin  81 mg Oral Daily     Continuous Infusions:   dextrose         I/O last 3 completed shifts: In: 2750 [P.O.:1620; I.V.:90]  Out: 1600 [Urine:1600]       Intake/Output Summary (Last 24 hours) at 09/16/17 0952  Last data filed at 09/16/17 0450   Gross per 24 hour   Intake          1710.03 ml   Output             1600 ml   Net           110.03 ml     CBC:   Recent Labs      09/14/17   0330   WBC  7.3   HGB  9.3*   HCT  27.0*   PLT  354     BMP:    Recent Labs      09/14/17   0330  09/15/17   0430  09/16/17   0600   NA  139  147*  147*   K  4.2  3.6  3.8   CL  102  107  107   CO2  28  28  27   BUN  15  20  18   CREATININE  1.4*  1.4*  1.3*   GLUCOSE  237*  69*  121*     Hepatic:   No results for input(s): AST, ALT, ALB, BILITOT, ALKPHOS in the last 72 hours. CA 19-9    Cancer Antigen 19-9           Collected: 07/20/17 0515     Resulting lab: [facility]     Reference range: 0 - 35 U/mL     Value: 84 (High)     Comment: The Roche \"ECLIA\" assay is used.  Results obtained with different assay   methods cannot be used interchangeably.    [facility] [address] [facility], [address] [place] [address] [phone]        *Additional information available - comment           8/24/2017  5:33 PM - [facility] Incoming Lab Results From Soft       Component Results      Component Value Ref Range & Units Status Collected Lab     CA 19-9 84 (H) 0 - 35 U/mL Final 07/20/2017  5:15 AM  - [facility]         Urine:       MICRO:    Respiratory Culture --       Endotracheal tube cultures do not correlate well with

## 2017-09-17 LAB
BLOOD CULTURE, ROUTINE: NORMAL
BLOOD CULTURE, ROUTINE: NORMAL

## 2017-09-18 LAB
BLOOD CULTURE, ROUTINE: NORMAL
BLOOD CULTURE, ROUTINE: NORMAL

## 2017-09-18 RX ORDER — SODIUM CHLORIDE 9 MG/ML
INJECTION, SOLUTION INTRAVENOUS CONTINUOUS
Status: CANCELLED | OUTPATIENT
Start: 2017-09-18

## 2017-09-19 ENCOUNTER — HOSPITAL ENCOUNTER (OUTPATIENT)
Dept: INPATIENT UNIT | Age: 67
Discharge: HOME | End: 2017-09-19

## 2017-09-22 ENCOUNTER — HOSPITAL ENCOUNTER (OUTPATIENT)
Age: 67
Discharge: HOME | End: 2017-09-22
Payer: MEDICARE

## 2017-09-22 ENCOUNTER — HOSPITAL ENCOUNTER (OUTPATIENT)
Dept: GENERAL RADIOLOGY | Age: 67
Discharge: HOME | End: 2017-09-22
Payer: MEDICARE

## 2017-09-22 DIAGNOSIS — R05.9 COUGH: ICD-10-CM

## 2017-09-22 DIAGNOSIS — Z51.81 ENCOUNTER FOR THERAPEUTIC DRUG MONITORING: ICD-10-CM

## 2017-09-22 DIAGNOSIS — J18.9 PNEUMONIA, ORGANISM UNSPECIFIED(486): ICD-10-CM

## 2017-09-22 DIAGNOSIS — D64.9 ANEMIA, UNSPECIFIED: ICD-10-CM

## 2017-09-22 LAB
ANION GAP SERPL CALCULATED.3IONS-SCNC: 14 MEQ/L (ref 8–16)
ANISOCYTOSIS: ABNORMAL
ASPERGILLUS ANTIBODY CF: < 1
BASOPHILS # BLD: 1.3 %
BASOPHILS ABSOLUTE: 0.1 THOU/MM3 (ref 0–0.1)
BLASTOMYCES ANTIBODY CF: NORMAL
BUN BLDV-MCNC: 27 MG/DL (ref 7–22)
CALCIUM SERPL-MCNC: 9.1 MG/DL (ref 8.5–10.5)
CHLORIDE BLD-SCNC: 94 MEQ/L (ref 98–111)
CO2: 29 MEQ/L (ref 23–33)
COCCIDIODES AB CF: < 1
CREAT SERPL-MCNC: 1.5 MG/DL (ref 0.4–1.2)
EOSINOPHIL # BLD: 3.7 %
EOSINOPHILS ABSOLUTE: 0.3 THOU/MM3 (ref 0–0.4)
GFR SERPL CREATININE-BSD FRML MDRD: 46 ML/MIN/1.73M2
GLUCOSE BLD-MCNC: 280 MG/DL (ref 70–108)
HCT VFR BLD CALC: 31.2 % (ref 42–52)
HEMOGLOBIN: 10.4 GM/DL (ref 14–18)
HISTOPLASMA ANTIBODY MYCELIAL CF: < 1
HISTOPLASMA ANTIBODY YEAST CF: < 1
LYMPHOCYTES # BLD: 12.7 %
LYMPHOCYTES ABSOLUTE: 1.1 THOU/MM3 (ref 1–4.8)
MCH RBC QN AUTO: 29.2 PG (ref 27–31)
MCHC RBC AUTO-ENTMCNC: 33.3 GM/DL (ref 33–37)
MCV RBC AUTO: 87.7 FL (ref 80–94)
MONOCYTES # BLD: 8.4 %
MONOCYTES ABSOLUTE: 0.7 THOU/MM3 (ref 0.4–1.3)
NUCLEATED RED BLOOD CELLS: 0 /100 WBC
PDW BLD-RTO: 15.1 % (ref 11.5–14.5)
PLATELET # BLD: 758 THOU/MM3 (ref 130–400)
PMV BLD AUTO: 7.1 MCM (ref 7.4–10.4)
POTASSIUM SERPL-SCNC: 4.6 MEQ/L (ref 3.5–5.2)
RBC # BLD: 3.56 MILL/MM3 (ref 4.7–6.1)
RBC # BLD: NORMAL 10*6/UL
SEG NEUTROPHILS: 73.9 %
SEGMENTED NEUTROPHILS ABSOLUTE COUNT: 6.4 THOU/MM3 (ref 1.8–7.7)
SODIUM BLD-SCNC: 137 MEQ/L (ref 135–145)
WBC # BLD: 8.6 THOU/MM3 (ref 4.8–10.8)

## 2017-09-22 PROCEDURE — 85025 COMPLETE CBC W/AUTO DIFF WBC: CPT

## 2017-09-22 PROCEDURE — 80048 BASIC METABOLIC PNL TOTAL CA: CPT

## 2017-09-22 PROCEDURE — 71020 XR CHEST STANDARD TWO VW: CPT

## 2017-09-22 PROCEDURE — 36415 COLL VENOUS BLD VENIPUNCTURE: CPT

## 2017-09-23 NOTE — PROGRESS NOTES
Pt discharged from hospital. We were on the case.    Please ask patient to schedule follow-up with his primary nephrologist soon

## 2017-09-25 ENCOUNTER — TELEPHONE (OUTPATIENT)
Dept: NEPHROLOGY | Age: 67
End: 2017-09-25

## 2017-09-26 ENCOUNTER — OFFICE VISIT (OUTPATIENT)
Dept: PULMONOLOGY | Age: 67
End: 2017-09-26
Payer: MEDICARE

## 2017-09-26 ENCOUNTER — HOSPITAL ENCOUNTER (OUTPATIENT)
Age: 67
Discharge: HOME | End: 2017-09-26
Payer: MEDICARE

## 2017-09-26 ENCOUNTER — HOSPITAL ENCOUNTER (OUTPATIENT)
Dept: GENERAL RADIOLOGY | Age: 67
Discharge: HOME | End: 2017-09-26
Payer: MEDICARE

## 2017-09-26 ENCOUNTER — TELEPHONE (OUTPATIENT)
Dept: NEPHROLOGY | Age: 67
End: 2017-09-26

## 2017-09-26 VITALS
HEART RATE: 69 BPM | OXYGEN SATURATION: 93 % | HEIGHT: 69 IN | SYSTOLIC BLOOD PRESSURE: 119 MMHG | TEMPERATURE: 96.4 F | BODY MASS INDEX: 26.72 KG/M2 | DIASTOLIC BLOOD PRESSURE: 70 MMHG | WEIGHT: 180.4 LBS

## 2017-09-26 DIAGNOSIS — R06.02 SHORTNESS OF BREATH: ICD-10-CM

## 2017-09-26 DIAGNOSIS — J44.9 COPD, SEVERE (HCC): ICD-10-CM

## 2017-09-26 LAB
ANION GAP SERPL CALCULATED.3IONS-SCNC: 17 MEQ/L (ref 8–16)
BUN BLDV-MCNC: 59 MG/DL (ref 7–22)
CALCIUM SERPL-MCNC: 9.4 MG/DL (ref 8.5–10.5)
CHLORIDE BLD-SCNC: 92 MEQ/L (ref 98–111)
CO2: 29 MEQ/L (ref 23–33)
CREAT SERPL-MCNC: 2 MG/DL (ref 0.4–1.2)
GFR SERPL CREATININE-BSD FRML MDRD: 33 ML/MIN/1.73M2
GLUCOSE BLD-MCNC: 446 MG/DL (ref 70–108)
POTASSIUM SERPL-SCNC: 5.3 MEQ/L (ref 3.5–5.2)
SODIUM BLD-SCNC: 138 MEQ/L (ref 135–145)

## 2017-09-26 PROCEDURE — G8427 DOCREV CUR MEDS BY ELIG CLIN: HCPCS | Performed by: INTERNAL MEDICINE

## 2017-09-26 PROCEDURE — 1123F ACP DISCUSS/DSCN MKR DOCD: CPT | Performed by: INTERNAL MEDICINE

## 2017-09-26 PROCEDURE — G8926 SPIRO NO PERF OR DOC: HCPCS | Performed by: INTERNAL MEDICINE

## 2017-09-26 PROCEDURE — 3017F COLORECTAL CA SCREEN DOC REV: CPT | Performed by: INTERNAL MEDICINE

## 2017-09-26 PROCEDURE — 3023F SPIROM DOC REV: CPT | Performed by: INTERNAL MEDICINE

## 2017-09-26 PROCEDURE — 80048 BASIC METABOLIC PNL TOTAL CA: CPT

## 2017-09-26 PROCEDURE — 4040F PNEUMOC VAC/ADMIN/RCVD: CPT | Performed by: INTERNAL MEDICINE

## 2017-09-26 PROCEDURE — 36415 COLL VENOUS BLD VENIPUNCTURE: CPT

## 2017-09-26 PROCEDURE — G8417 CALC BMI ABV UP PARAM F/U: HCPCS | Performed by: INTERNAL MEDICINE

## 2017-09-26 PROCEDURE — 1111F DSCHRG MED/CURRENT MED MERGE: CPT | Performed by: INTERNAL MEDICINE

## 2017-09-26 PROCEDURE — 71020 XR CHEST STANDARD TWO VW: CPT

## 2017-09-26 PROCEDURE — 99214 OFFICE O/P EST MOD 30 MIN: CPT | Performed by: INTERNAL MEDICINE

## 2017-09-26 PROCEDURE — 1036F TOBACCO NON-USER: CPT | Performed by: INTERNAL MEDICINE

## 2017-09-26 PROCEDURE — G8598 ASA/ANTIPLAT THER USED: HCPCS | Performed by: INTERNAL MEDICINE

## 2017-09-26 RX ORDER — ALBUTEROL SULFATE 90 UG/1
2 AEROSOL, METERED RESPIRATORY (INHALATION) EVERY 6 HOURS PRN
Qty: 1 INHALER | Refills: 11 | Status: SHIPPED | OUTPATIENT
Start: 2017-09-26 | End: 2018-07-09 | Stop reason: SDUPTHER

## 2017-09-27 ENCOUNTER — TELEPHONE (OUTPATIENT)
Dept: NEPHROLOGY | Age: 67
End: 2017-09-27

## 2017-09-27 NOTE — PROGRESS NOTES
Please forward this to his nephrologist and call the office to confirm.  He follows with Dr. Yahir Villa (nephrologist)

## 2017-10-06 ENCOUNTER — HOSPITAL ENCOUNTER (OUTPATIENT)
Age: 67
Discharge: HOME | End: 2017-10-06
Payer: MEDICARE

## 2017-10-06 LAB
ALBUMIN SERPL-MCNC: 4.3 G/DL (ref 3.5–5.1)
ANION GAP SERPL CALCULATED.3IONS-SCNC: 17 MEQ/L (ref 8–16)
BILIRUBIN URINE: NEGATIVE
BLOOD, URINE: NEGATIVE
BUN BLDV-MCNC: 76 MG/DL (ref 7–22)
CALCIUM SERPL-MCNC: 9.2 MG/DL (ref 8.5–10.5)
CHARACTER, URINE: CLEAR
CHLORIDE BLD-SCNC: 96 MEQ/L (ref 98–111)
CO2: 28 MEQ/L (ref 23–33)
COLOR: YELLOW
CREAT SERPL-MCNC: 2.3 MG/DL (ref 0.4–1.2)
CREATININE, URINE: 49 MG/DL
GFR SERPL CREATININE-BSD FRML MDRD: 28 ML/MIN/1.73M2
GLUCOSE BLD-MCNC: 352 MG/DL (ref 70–108)
GLUCOSE, URINE: NEGATIVE MG/DL
HCT VFR BLD CALC: 35.8 % (ref 42–52)
HEMOGLOBIN: 11.7 GM/DL (ref 14–18)
KETONES, URINE: NEGATIVE
LEUKOCYTE ESTERASE, URINE: NEGATIVE
MAGNESIUM: 2.6 MG/DL (ref 1.6–2.4)
MCH RBC QN AUTO: 28.6 PG (ref 27–31)
MCHC RBC AUTO-ENTMCNC: 32.8 GM/DL (ref 33–37)
MCV RBC AUTO: 87 FL (ref 80–94)
MICROALBUMIN UR-MCNC: 1.68 MG/DL
MICROALBUMIN/CREAT UR-RTO: 34 MG/G (ref 0–30)
NITRITE, URINE: NEGATIVE
PDW BLD-RTO: 13.9 % (ref 11.5–14.5)
PH UA: 6 (ref 5–9)
PHOSPHORUS: 4.6 MG/DL (ref 2.4–4.7)
PLATELET # BLD: 368 THOU/MM3 (ref 130–400)
PMV BLD AUTO: 8.8 MCM (ref 7.4–10.4)
POTASSIUM SERPL-SCNC: 5.4 MEQ/L (ref 3.5–5.2)
PROTEIN UA: NEGATIVE MG/DL
RBC # BLD: 4.11 MILL/MM3 (ref 4.7–6.1)
SODIUM BLD-SCNC: 141 MEQ/L (ref 135–145)
SPECIFIC GRAVITY UA: 1.01 (ref 1–1.03)
URIC ACID: 10.1 MG/DL (ref 3.7–7)
UROBILINOGEN, URINE: 0.2 EU/DL (ref 0.2–1)
WBC # BLD: 6.6 THOU/MM3 (ref 4.8–10.8)

## 2017-10-06 PROCEDURE — 82043 UR ALBUMIN QUANTITATIVE: CPT

## 2017-10-06 PROCEDURE — 83735 ASSAY OF MAGNESIUM: CPT

## 2017-10-06 PROCEDURE — 36415 COLL VENOUS BLD VENIPUNCTURE: CPT

## 2017-10-06 PROCEDURE — 84100 ASSAY OF PHOSPHORUS: CPT

## 2017-10-06 PROCEDURE — 80048 BASIC METABOLIC PNL TOTAL CA: CPT

## 2017-10-06 PROCEDURE — 85027 COMPLETE CBC AUTOMATED: CPT

## 2017-10-06 PROCEDURE — 82040 ASSAY OF SERUM ALBUMIN: CPT

## 2017-10-06 PROCEDURE — 84550 ASSAY OF BLOOD/URIC ACID: CPT

## 2017-10-06 PROCEDURE — 81003 URINALYSIS AUTO W/O SCOPE: CPT

## 2017-10-12 ENCOUNTER — HOSPITAL ENCOUNTER (OUTPATIENT)
Age: 67
End: 2017-10-12
Payer: MEDICARE

## 2017-10-13 ENCOUNTER — HOSPITAL ENCOUNTER (OUTPATIENT)
Age: 67
Discharge: HOME | End: 2017-10-13
Payer: MEDICARE

## 2017-10-13 LAB
AVERAGE GLUCOSE: 183 MG/DL (ref 70–126)
GLUCOSE, TWO-HOUR POSTPRANDIAL: 561 MG/DL (ref 70–108)
HBA1C MFR BLD: 8.1 % (ref 4.4–6.4)

## 2017-10-13 PROCEDURE — 36415 COLL VENOUS BLD VENIPUNCTURE: CPT

## 2017-10-13 PROCEDURE — 83036 HEMOGLOBIN GLYCOSYLATED A1C: CPT

## 2017-10-13 PROCEDURE — 82947 ASSAY GLUCOSE BLOOD QUANT: CPT

## 2017-10-13 PROCEDURE — 82985 ASSAY OF GLYCATED PROTEIN: CPT

## 2017-10-16 LAB — FRUCTOSAMINE: 423 UMOL/L (ref 170–285)

## 2017-12-06 ENCOUNTER — HOSPITAL ENCOUNTER (OUTPATIENT)
Age: 67
Discharge: HOME | End: 2017-12-06
Payer: MEDICARE

## 2017-12-06 LAB
ANION GAP SERPL CALCULATED.3IONS-SCNC: 17 MEQ/L (ref 8–16)
BUN BLDV-MCNC: 80 MG/DL (ref 7–22)
CALCIUM SERPL-MCNC: 9.1 MG/DL (ref 8.5–10.5)
CHLORIDE BLD-SCNC: 96 MEQ/L (ref 98–111)
CO2: 24 MEQ/L (ref 23–33)
CREAT SERPL-MCNC: 2 MG/DL (ref 0.4–1.2)
GFR SERPL CREATININE-BSD FRML MDRD: 33 ML/MIN/1.73M2
GLUCOSE BLD-MCNC: 516 MG/DL (ref 70–108)
POTASSIUM SERPL-SCNC: 6.3 MEQ/L (ref 3.5–5.2)
SODIUM BLD-SCNC: 137 MEQ/L (ref 135–145)

## 2017-12-06 PROCEDURE — 80048 BASIC METABOLIC PNL TOTAL CA: CPT

## 2017-12-06 PROCEDURE — 36415 COLL VENOUS BLD VENIPUNCTURE: CPT

## 2017-12-16 ENCOUNTER — HOSPITAL ENCOUNTER (OUTPATIENT)
Age: 67
Discharge: HOME | End: 2017-12-16
Payer: MEDICARE

## 2017-12-16 LAB
AVERAGE GLUCOSE: 228 MG/DL (ref 70–126)
GLUCOSE BLD-MCNC: 201 MG/DL (ref 70–108)
HBA1C MFR BLD: 9.6 % (ref 4.4–6.4)

## 2017-12-16 PROCEDURE — 82947 ASSAY GLUCOSE BLOOD QUANT: CPT

## 2017-12-16 PROCEDURE — 83036 HEMOGLOBIN GLYCOSYLATED A1C: CPT

## 2017-12-16 PROCEDURE — 82985 ASSAY OF GLYCATED PROTEIN: CPT

## 2017-12-16 PROCEDURE — 36415 COLL VENOUS BLD VENIPUNCTURE: CPT

## 2017-12-19 LAB — FRUCTOSAMINE: 476 UMOL/L (ref 170–285)

## 2018-01-01 ENCOUNTER — HOSPITAL ENCOUNTER (OUTPATIENT)
Age: 68
Discharge: HOME | End: 2018-11-19
Payer: MEDICARE

## 2018-01-01 ENCOUNTER — HOSPITAL ENCOUNTER (OUTPATIENT)
Dept: GENERAL RADIOLOGY | Age: 68
Discharge: HOME | End: 2018-11-19
Payer: MEDICARE

## 2018-01-01 DIAGNOSIS — M79.661 PAIN OF KNEE AND LOWER LEG, RIGHT: ICD-10-CM

## 2018-01-01 DIAGNOSIS — M89.8X5 PAIN IN RIGHT FEMUR: ICD-10-CM

## 2018-01-01 DIAGNOSIS — M25.561 PAIN OF KNEE AND LOWER LEG, RIGHT: ICD-10-CM

## 2018-01-01 PROCEDURE — 73552 X-RAY EXAM OF FEMUR 2/>: CPT

## 2018-01-01 PROCEDURE — 72100 X-RAY EXAM L-S SPINE 2/3 VWS: CPT

## 2018-01-05 DIAGNOSIS — J44.9 COPD, SEVERE (HCC): ICD-10-CM

## 2018-01-08 RX ORDER — TIOTROPIUM BROMIDE 18 UG/1
CAPSULE ORAL; RESPIRATORY (INHALATION)
Qty: 30 CAPSULE | Refills: 11 | Status: SHIPPED | OUTPATIENT
Start: 2018-01-08 | End: 2018-10-23 | Stop reason: SDUPTHER

## 2018-02-03 ENCOUNTER — HOSPITAL ENCOUNTER (OUTPATIENT)
Age: 68
Discharge: HOME | End: 2018-02-03
Payer: MEDICARE

## 2018-02-03 LAB
ALBUMIN SERPL-MCNC: 4.3 G/DL (ref 3.5–5.1)
AMORPHOUS: NORMAL
ANION GAP SERPL CALCULATED.3IONS-SCNC: 13 MEQ/L (ref 8–16)
BACTERIA: NORMAL
BILIRUBIN URINE: NEGATIVE
BLOOD, URINE: NEGATIVE
BUN BLDV-MCNC: 77 MG/DL (ref 7–22)
CALCIUM SERPL-MCNC: 9.4 MG/DL (ref 8.5–10.5)
CASTS UA: NORMAL /LPF
CHARACTER, URINE: CLEAR
CHLORIDE BLD-SCNC: 99 MEQ/L (ref 98–111)
CO2: 29 MEQ/L (ref 23–33)
COLOR: YELLOW
CREAT SERPL-MCNC: 2.3 MG/DL (ref 0.4–1.2)
CREATININE, URINE: 87.3 MG/DL
CRYSTALS, UA: NORMAL
EPITHELIAL CELLS, UA: NORMAL /HPF
GFR SERPL CREATININE-BSD FRML MDRD: 28 ML/MIN/1.73M2
GLUCOSE BLD-MCNC: 175 MG/DL (ref 70–108)
GLUCOSE, URINE: 500 MG/DL
HCT VFR BLD CALC: 36.5 % (ref 42–52)
HEMOGLOBIN: 12.2 GM/DL (ref 14–18)
KETONES, URINE: NEGATIVE
LEUKOCYTE ESTERASE, URINE: NEGATIVE
MAGNESIUM: 2.7 MG/DL (ref 1.6–2.4)
MCH RBC QN AUTO: 28.3 PG (ref 27–31)
MCHC RBC AUTO-ENTMCNC: 33.3 GM/DL (ref 33–37)
MCV RBC AUTO: 85.1 FL (ref 80–94)
MICROALBUMIN UR-MCNC: 1.6 MG/DL
MICROALBUMIN/CREAT UR-RTO: 18 MG/G (ref 0–30)
MUCUS: NORMAL
NITRITE, URINE: NEGATIVE
PDW BLD-RTO: 12.8 % (ref 11.5–14.5)
PH UA: 5 (ref 5–9)
PHOSPHORUS: 4.8 MG/DL (ref 2.4–4.7)
PLATELET # BLD: 365 THOU/MM3 (ref 130–400)
PMV BLD AUTO: 8.4 FL (ref 7.4–10.4)
POTASSIUM SERPL-SCNC: 5.1 MEQ/L (ref 3.5–5.2)
PROTEIN UA: NEGATIVE MG/DL
PTH INTACT: 41.1 PG/ML (ref 15–65)
RBC # BLD: 4.29 MILL/MM3 (ref 4.7–6.1)
RBC URINE: NORMAL /HPF
SODIUM BLD-SCNC: 141 MEQ/L (ref 135–145)
SPECIFIC GRAVITY UA: < 1.005 (ref 1–1.03)
URIC ACID: 11.1 MG/DL (ref 3.7–7)
UROBILINOGEN, URINE: 0.2 EU/DL (ref 0.2–1)
VITAMIN D 25-HYDROXY: 40 NG/ML (ref 30–100)
WBC # BLD: 5.9 THOU/MM3 (ref 4.8–10.8)
WBC UA: NORMAL /HPF

## 2018-02-03 PROCEDURE — 83970 ASSAY OF PARATHORMONE: CPT

## 2018-02-03 PROCEDURE — 84550 ASSAY OF BLOOD/URIC ACID: CPT

## 2018-02-03 PROCEDURE — 82040 ASSAY OF SERUM ALBUMIN: CPT

## 2018-02-03 PROCEDURE — 82306 VITAMIN D 25 HYDROXY: CPT

## 2018-02-03 PROCEDURE — 82043 UR ALBUMIN QUANTITATIVE: CPT

## 2018-02-03 PROCEDURE — 83735 ASSAY OF MAGNESIUM: CPT

## 2018-02-03 PROCEDURE — 81001 URINALYSIS AUTO W/SCOPE: CPT

## 2018-02-03 PROCEDURE — 81003 URINALYSIS AUTO W/O SCOPE: CPT

## 2018-02-03 PROCEDURE — 85027 COMPLETE CBC AUTOMATED: CPT

## 2018-02-03 PROCEDURE — 84100 ASSAY OF PHOSPHORUS: CPT

## 2018-02-03 PROCEDURE — 80048 BASIC METABOLIC PNL TOTAL CA: CPT

## 2018-02-03 PROCEDURE — 36415 COLL VENOUS BLD VENIPUNCTURE: CPT

## 2018-02-27 NOTE — DISCHARGE SUMMARY
stents     HARDY  · Nephro on the case, holding diuretics at this time   · Mucomyst BID for linda-cath renal protection  ? 9/14/17 - Stop IVF, restart diuretics tomorrow if renal function okay     9/15/17 - Back on Bumex, on Coreg; monitoring BP and increasing activity. Cardiology wants patient to remain here one more night and hopefully discharge tomorrow               Discharge Medications:   Antonia Aguilera   Home Medication Instructions KS:[id number]    Printed on:02/26/18 2055   Medication Information                      acetaminophen (TYLENOL) 325 MG tablet  Take 650 mg by mouth every 6 hours as needed for Pain             Ascorbic Acid (VITAMIN C) 500 MG tablet  Take 500 mg by mouth daily             aspirin 81 MG EC tablet  Take 81 mg by mouth nightly              B Complex-Folic Acid (B COMPLEX PLUS PO)  Take 1 tablet by mouth daily             bumetanide (BUMEX) 1 MG tablet  Take 1 mg by mouth 2 times daily              carvedilol (COREG) 25 MG tablet  Take 1.5 tablets by mouth 2 times daily (with meals)             CINNAMON PO  Take 2 capsules by mouth daily             CPAP Machine MISC  by Does not apply route Please change CPAP pressure to 13 cm H20. Exenatide (BYDUREON) 2 MG PEN  Inject 2 mg into the skin once a week Takes on Sunday              glucose monitoring kit (FREESTYLE) monitoring kit  Need one glucometer and 100 testing strips for 3-4 times per day testing.   Diagnosis is diabetes type 2             hydrALAZINE (APRESOLINE) 25 MG tablet  Take 1 tablet by mouth 3 times daily             Insulin Disposable Pump (V-GO 30) KIT  Humalog insulin   2 units/click  Pt uses 1-2 clicks prior to breakfast  6 clicks prior to lunch  6 clicks prior to evening meal   1-2 clicks with a snack             Insulin Syringe-Needle U-100 (INSULIN SYRINGE .3CC/31GX5/16\") 31G X 5/16\" 0.3 ML MISC  1 each by Does not apply route daily             Lancets MISC  1 each by Does not apply route daily

## 2018-04-14 ENCOUNTER — HOSPITAL ENCOUNTER (EMERGENCY)
Age: 68
Discharge: HOME | End: 2018-04-14
Attending: FAMILY MEDICINE
Payer: MEDICARE

## 2018-04-14 ENCOUNTER — HOSPITAL ENCOUNTER (OUTPATIENT)
Age: 68
Discharge: HOME | End: 2018-04-14
Payer: MEDICARE

## 2018-04-14 VITALS
DIASTOLIC BLOOD PRESSURE: 60 MMHG | HEART RATE: 77 BPM | SYSTOLIC BLOOD PRESSURE: 124 MMHG | OXYGEN SATURATION: 95 % | RESPIRATION RATE: 22 BRPM | TEMPERATURE: 98.2 F | HEIGHT: 68 IN | BODY MASS INDEX: 28.04 KG/M2 | WEIGHT: 185 LBS

## 2018-04-14 DIAGNOSIS — E87.5 HYPERKALEMIA: Primary | ICD-10-CM

## 2018-04-14 LAB
ALBUMIN SERPL-MCNC: 3.9 G/DL (ref 3.5–5.1)
ALP BLD-CCNC: 77 U/L (ref 38–126)
ALT SERPL-CCNC: 18 U/L (ref 11–66)
ANION GAP SERPL CALCULATED.3IONS-SCNC: 13 MEQ/L (ref 8–16)
ANION GAP SERPL CALCULATED.3IONS-SCNC: 15 MEQ/L (ref 8–16)
AST SERPL-CCNC: 19 U/L (ref 5–40)
AVERAGE GLUCOSE: 219 MG/DL (ref 70–126)
BASOPHILS # BLD: 1.1 %
BASOPHILS ABSOLUTE: 0.1 THOU/MM3 (ref 0–0.1)
BILIRUB SERPL-MCNC: < 0.2 MG/DL (ref 0.3–1.2)
BUN BLDV-MCNC: 69 MG/DL (ref 7–22)
BUN BLDV-MCNC: 73 MG/DL (ref 7–22)
CALCIUM SERPL-MCNC: 9 MG/DL (ref 8.5–10.5)
CALCIUM SERPL-MCNC: 9.4 MG/DL (ref 8.5–10.5)
CHLORIDE BLD-SCNC: 95 MEQ/L (ref 98–111)
CHLORIDE BLD-SCNC: 97 MEQ/L (ref 98–111)
CO2: 26 MEQ/L (ref 23–33)
CO2: 28 MEQ/L (ref 23–33)
CREAT SERPL-MCNC: 2.1 MG/DL (ref 0.4–1.2)
CREAT SERPL-MCNC: 2.1 MG/DL (ref 0.4–1.2)
EKG ATRIAL RATE: 77 BPM
EKG Q-T INTERVAL: 386 MS
EKG QRS DURATION: 72 MS
EKG QTC CALCULATION (BAZETT): 436 MS
EKG R AXIS: -10 DEGREES
EKG T AXIS: 63 DEGREES
EKG VENTRICULAR RATE: 77 BPM
EOSINOPHIL # BLD: 4.4 %
EOSINOPHILS ABSOLUTE: 0.3 THOU/MM3 (ref 0–0.4)
GFR SERPL CREATININE-BSD FRML MDRD: 31 ML/MIN/1.73M2
GFR SERPL CREATININE-BSD FRML MDRD: 31 ML/MIN/1.73M2
GLUCOSE BLD-MCNC: 121 MG/DL (ref 70–108)
GLUCOSE BLD-MCNC: 208 MG/DL (ref 70–108)
GLUCOSE BLD-MCNC: 231 MG/DL (ref 70–108)
GLUCOSE BLD-MCNC: 486 MG/DL (ref 70–108)
HBA1C MFR BLD: 9.3 % (ref 4.4–6.4)
HCT VFR BLD CALC: 33.2 % (ref 42–52)
HEMOGLOBIN: 11.3 GM/DL (ref 14–18)
LYMPHOCYTES # BLD: 16.8 %
LYMPHOCYTES ABSOLUTE: 1.3 THOU/MM3 (ref 1–4.8)
MCH RBC QN AUTO: 29.2 PG (ref 27–31)
MCHC RBC AUTO-ENTMCNC: 34 GM/DL (ref 33–37)
MCV RBC AUTO: 86.1 FL (ref 80–94)
MONOCYTES # BLD: 11.5 %
MONOCYTES ABSOLUTE: 0.9 THOU/MM3 (ref 0.4–1.3)
NUCLEATED RED BLOOD CELLS: 0 /100 WBC
OSMOLALITY CALCULATION: 304.6 MOSMOL/KG (ref 275–300)
PDW BLD-RTO: 12.8 % (ref 11.5–14.5)
PLATELET # BLD: 354 THOU/MM3 (ref 130–400)
PMV BLD AUTO: 7.7 FL (ref 7.4–10.4)
POTASSIUM SERPL-SCNC: 4.6 MEQ/L (ref 3.5–5.2)
POTASSIUM SERPL-SCNC: 5 MEQ/L (ref 3.5–5.2)
POTASSIUM SERPL-SCNC: 6.5 MEQ/L (ref 3.5–5.2)
POTASSIUM SERPL-SCNC: 6.7 MEQ/L (ref 3.5–5.2)
RBC # BLD: 3.86 MILL/MM3 (ref 4.7–6.1)
SEG NEUTROPHILS: 66.2 %
SEGMENTED NEUTROPHILS ABSOLUTE COUNT: 5.1 THOU/MM3 (ref 1.8–7.7)
SODIUM BLD-SCNC: 136 MEQ/L (ref 135–145)
SODIUM BLD-SCNC: 138 MEQ/L (ref 135–145)
TOTAL PROTEIN: 6.8 G/DL (ref 6.1–8)
TROPONIN T: < 0.01 NG/ML
WBC # BLD: 7.7 THOU/MM3 (ref 4.8–10.8)

## 2018-04-14 PROCEDURE — 99285 EMERGENCY DEPT VISIT HI MDM: CPT

## 2018-04-14 PROCEDURE — 93010 ELECTROCARDIOGRAM REPORT: CPT | Performed by: NUCLEAR MEDICINE

## 2018-04-14 PROCEDURE — 6370000000 HC RX 637 (ALT 250 FOR IP): Performed by: FAMILY MEDICINE

## 2018-04-14 PROCEDURE — 36415 COLL VENOUS BLD VENIPUNCTURE: CPT

## 2018-04-14 PROCEDURE — 80048 BASIC METABOLIC PNL TOTAL CA: CPT

## 2018-04-14 PROCEDURE — 6360000002 HC RX W HCPCS: Performed by: FAMILY MEDICINE

## 2018-04-14 PROCEDURE — 83036 HEMOGLOBIN GLYCOSYLATED A1C: CPT

## 2018-04-14 PROCEDURE — 94640 AIRWAY INHALATION TREATMENT: CPT

## 2018-04-14 PROCEDURE — 2580000003 HC RX 258: Performed by: FAMILY MEDICINE

## 2018-04-14 PROCEDURE — 84484 ASSAY OF TROPONIN QUANT: CPT

## 2018-04-14 PROCEDURE — 93005 ELECTROCARDIOGRAM TRACING: CPT | Performed by: FAMILY MEDICINE

## 2018-04-14 PROCEDURE — 84132 ASSAY OF SERUM POTASSIUM: CPT

## 2018-04-14 PROCEDURE — 96375 TX/PRO/DX INJ NEW DRUG ADDON: CPT

## 2018-04-14 PROCEDURE — 80053 COMPREHEN METABOLIC PANEL: CPT

## 2018-04-14 PROCEDURE — 82948 REAGENT STRIP/BLOOD GLUCOSE: CPT

## 2018-04-14 PROCEDURE — 96374 THER/PROPH/DIAG INJ IV PUSH: CPT

## 2018-04-14 PROCEDURE — 85025 COMPLETE CBC W/AUTO DIFF WBC: CPT

## 2018-04-14 RX ORDER — SODIUM POLYSTYRENE SULFONATE 15 G/60ML
15 SUSPENSION ORAL; RECTAL ONCE
Status: COMPLETED | OUTPATIENT
Start: 2018-04-14 | End: 2018-04-14

## 2018-04-14 RX ORDER — NICOTINE POLACRILEX 4 MG
15 LOZENGE BUCCAL PRN
Status: DISCONTINUED | OUTPATIENT
Start: 2018-04-14 | End: 2018-04-14 | Stop reason: HOSPADM

## 2018-04-14 RX ORDER — CALCIUM GLUCONATE 94 MG/ML
1 INJECTION, SOLUTION INTRAVENOUS ONCE
Status: COMPLETED | OUTPATIENT
Start: 2018-04-14 | End: 2018-04-14

## 2018-04-14 RX ORDER — DEXTROSE MONOHYDRATE 25 G/50ML
12.5 INJECTION, SOLUTION INTRAVENOUS PRN
Status: DISCONTINUED | OUTPATIENT
Start: 2018-04-14 | End: 2018-04-14 | Stop reason: HOSPADM

## 2018-04-14 RX ORDER — DEXTROSE MONOHYDRATE 25 G/50ML
25 INJECTION, SOLUTION INTRAVENOUS ONCE
Status: COMPLETED | OUTPATIENT
Start: 2018-04-14 | End: 2018-04-14

## 2018-04-14 RX ORDER — DEXTROSE MONOHYDRATE 50 MG/ML
100 INJECTION, SOLUTION INTRAVENOUS PRN
Status: DISCONTINUED | OUTPATIENT
Start: 2018-04-14 | End: 2018-04-14 | Stop reason: HOSPADM

## 2018-04-14 RX ADMIN — ALBUTEROL SULFATE 10 MG: 2.5 SOLUTION RESPIRATORY (INHALATION) at 17:44

## 2018-04-14 RX ADMIN — SODIUM POLYSTYRENE SULFONATE 15 G: 15 SUSPENSION ORAL; RECTAL at 19:23

## 2018-04-14 RX ADMIN — DEXTROSE MONOHYDRATE 25 G: 25 INJECTION, SOLUTION INTRAVENOUS at 19:23

## 2018-04-14 RX ADMIN — CALCIUM GLUCONATE 1 G: 94 INJECTION, SOLUTION INTRAVENOUS at 19:21

## 2018-04-14 RX ADMIN — INSULIN HUMAN 10 UNITS: 100 INJECTION, SOLUTION PARENTERAL at 19:23

## 2018-04-14 ASSESSMENT — ENCOUNTER SYMPTOMS
EYE DISCHARGE: 0
DIARRHEA: 0
NAUSEA: 0
SHORTNESS OF BREATH: 0
VOMITING: 0
WHEEZING: 0
BACK PAIN: 0
EYE REDNESS: 0
COUGH: 0
ABDOMINAL PAIN: 0
SORE THROAT: 0
RHINORRHEA: 0

## 2018-04-19 ENCOUNTER — HOSPITAL ENCOUNTER (OUTPATIENT)
Age: 68
Discharge: HOME | End: 2018-04-19
Payer: MEDICARE

## 2018-04-19 LAB
ANION GAP SERPL CALCULATED.3IONS-SCNC: 16 MEQ/L (ref 8–16)
BUN BLDV-MCNC: 60 MG/DL (ref 7–22)
CALCIUM SERPL-MCNC: 9.4 MG/DL (ref 8.5–10.5)
CHLORIDE BLD-SCNC: 98 MEQ/L (ref 98–111)
CO2: 26 MEQ/L (ref 23–33)
CREAT SERPL-MCNC: 2 MG/DL (ref 0.4–1.2)
GFR SERPL CREATININE-BSD FRML MDRD: 33 ML/MIN/1.73M2
GLUCOSE BLD-MCNC: 225 MG/DL (ref 70–108)
POTASSIUM SERPL-SCNC: 4.9 MEQ/L (ref 3.5–5.2)
SODIUM BLD-SCNC: 140 MEQ/L (ref 135–145)

## 2018-04-19 PROCEDURE — 80048 BASIC METABOLIC PNL TOTAL CA: CPT

## 2018-04-19 PROCEDURE — 36415 COLL VENOUS BLD VENIPUNCTURE: CPT

## 2018-05-25 ENCOUNTER — HOSPITAL ENCOUNTER (OUTPATIENT)
Age: 68
Discharge: HOME | End: 2018-05-25
Payer: MEDICARE

## 2018-05-25 LAB
ANION GAP SERPL CALCULATED.3IONS-SCNC: 11 MEQ/L (ref 8–16)
BUN BLDV-MCNC: 93 MG/DL (ref 7–22)
CALCIUM SERPL-MCNC: 8.6 MG/DL (ref 8.5–10.5)
CHLORIDE BLD-SCNC: 95 MEQ/L (ref 98–111)
CO2: 28 MEQ/L (ref 23–33)
CREAT SERPL-MCNC: 2.8 MG/DL (ref 0.4–1.2)
GFR SERPL CREATININE-BSD FRML MDRD: 23 ML/MIN/1.73M2
GLUCOSE BLD-MCNC: 479 MG/DL (ref 70–108)
POTASSIUM SERPL-SCNC: 5.6 MEQ/L (ref 3.5–5.2)
SODIUM BLD-SCNC: 134 MEQ/L (ref 135–145)

## 2018-05-25 PROCEDURE — 80048 BASIC METABOLIC PNL TOTAL CA: CPT

## 2018-05-25 PROCEDURE — 36415 COLL VENOUS BLD VENIPUNCTURE: CPT

## 2018-07-05 ENCOUNTER — HOSPITAL ENCOUNTER (OUTPATIENT)
Dept: PULMONOLOGY | Age: 68
Discharge: HOME | End: 2018-07-05
Payer: MEDICARE

## 2018-07-05 DIAGNOSIS — J44.9 COPD, SEVERE (HCC): ICD-10-CM

## 2018-07-05 PROCEDURE — 94060 EVALUATION OF WHEEZING: CPT

## 2018-07-09 ENCOUNTER — OFFICE VISIT (OUTPATIENT)
Dept: PULMONOLOGY | Age: 68
End: 2018-07-09
Payer: MEDICARE

## 2018-07-09 VITALS
HEIGHT: 69 IN | WEIGHT: 191.2 LBS | OXYGEN SATURATION: 94 % | SYSTOLIC BLOOD PRESSURE: 114 MMHG | HEART RATE: 65 BPM | BODY MASS INDEX: 28.32 KG/M2 | DIASTOLIC BLOOD PRESSURE: 64 MMHG

## 2018-07-09 DIAGNOSIS — J44.9 COPD, SEVERE (HCC): ICD-10-CM

## 2018-07-09 DIAGNOSIS — Z87.891 HISTORY OF TOBACCO ABUSE: ICD-10-CM

## 2018-07-09 DIAGNOSIS — I50.42 CHRONIC COMBINED SYSTOLIC AND DIASTOLIC CONGESTIVE HEART FAILURE (HCC): ICD-10-CM

## 2018-07-09 DIAGNOSIS — Z87.01 HISTORY OF BACTERIAL PNEUMONIA: ICD-10-CM

## 2018-07-09 DIAGNOSIS — Z99.89 OSA ON CPAP: ICD-10-CM

## 2018-07-09 DIAGNOSIS — Z57.2 OCCUPATIONAL EXPOSURE TO DUST: ICD-10-CM

## 2018-07-09 DIAGNOSIS — G47.33 OSA ON CPAP: ICD-10-CM

## 2018-07-09 DIAGNOSIS — R91.1 LUNG NODULE, SOLITARY: ICD-10-CM

## 2018-07-09 PROCEDURE — 99214 OFFICE O/P EST MOD 30 MIN: CPT | Performed by: NURSE PRACTITIONER

## 2018-07-09 RX ORDER — ALBUTEROL SULFATE 90 UG/1
2 AEROSOL, METERED RESPIRATORY (INHALATION) EVERY 6 HOURS PRN
Qty: 1 INHALER | Refills: 11 | Status: SHIPPED | OUTPATIENT
Start: 2018-07-09 | End: 2019-01-01 | Stop reason: SDUPTHER

## 2018-07-09 SDOH — HEALTH STABILITY - PHYSICAL HEALTH: OCCUPATIONAL EXPOSURE TO DUST: Z57.2

## 2018-07-09 ASSESSMENT — ENCOUNTER SYMPTOMS
SPUTUM PRODUCTION: 0
COUGH: 0
DIARRHEA: 0
VOMITING: 0
EYES NEGATIVE: 1
NAUSEA: 0
WHEEZING: 0
SHORTNESS OF BREATH: 0
HEMOPTYSIS: 0
ABDOMINAL PAIN: 0

## 2018-07-09 NOTE — PROGRESS NOTES
Bilateral 2012, 2016    COLONOSCOPY  2006,2010,2016    CORONARY ANGIOPLASTY WITH STENT PLACEMENT      last one 2004    ELBOW SURGERY Bilateral     x 2    INGUINAL HERNIA REPAIR Bilateral     KNEE SURGERY Left     OR EGD TRANSORAL BIOPSY SINGLE/MULTIPLE Left 7/20/2017    EGD BIOPSY performed by Miko Way MD at [address] Bilateral    Fiona Escobar  2006,2007,2010,2012, 2016     SOCIAL HISTORY:  Social History   Substance Use Topics    Smoking status: Former Smoker     Packs/day: 0.50     Years: 40.00     Types: Cigarettes     Quit date: 3/9/2000    Smokeless tobacco: Never Used    Alcohol use No     ALLERGIES:  Allergies   Allergen Reactions    Amlodipine Itching and Swelling     swelling     FAMILY HISTORY:  Family History   Problem Relation Age of Onset    Heart Disease Father     Cancer Brother 52        type unknown    Colon Cancer Neg Hx     Inflam Bowel Dis Neg Hx      CURRENT MEDICATIONS:  Current Outpatient Prescriptions   Medication Sig Dispense Refill    albuterol sulfate  (90 Base) MCG/ACT inhaler Inhale 2 puffs into the lungs every 6 hours as needed for Wheezing 1 Inhaler 11    SPIRIVA HANDIHALER 18 MCG inhalation capsule INHALE 1 CAPSULE INTO THE LUNGS DAILY 30 capsule 11    carvedilol (COREG) 25 MG tablet Take 1.5 tablets by mouth 2 times daily (with meals) 90 tablet 0    hydrALAZINE (APRESOLINE) 25 MG tablet Take 1 tablet by mouth 3 times daily 90 tablet 0    Insulin Disposable Pump (V-GO 30) KIT Humalog insulin   2 units/click  Pt uses 1-2 clicks prior to breakfast  6 clicks prior to lunch  6 clicks prior to evening meal   1-2 clicks with a snack      CINNAMON PO Take 2 capsules by mouth daily      Ascorbic Acid (VITAMIN C) 500 MG tablet Take 500 mg by mouth daily      zinc gluconate 50 MG tablet Take 50 mg by mouth daily      B Complex-Folic Acid (B COMPLEX PLUS PO) Take 1 tablet by mouth daily      glucose monitoring kit (FREESTYLE) monitoring kit Need one glucometer and 100 testing strips for 3-4 times per day testing. Diagnosis is diabetes type 2 1 kit 0    Lancets MISC 1 each by Does not apply route daily 100 each 0    Insulin Syringe-Needle U-100 (INSULIN SYRINGE .3CC/31GX5/16\") 31G X 5/16\" 0.3 ML MISC 1 each by Does not apply route daily 100 each 0    bumetanide (BUMEX) 1 MG tablet Take 1 mg by mouth 2 times daily       levothyroxine (SYNTHROID) 25 MCG tablet Take 25 mcg by mouth daily      CPAP Machine MISC by Does not apply route Please change CPAP pressure to 13 cm H20. 1 each 0    Exenatide (BYDUREON) 2 MG PEN Inject 2 mg into the skin once a week Takes on Sunday       acetaminophen (TYLENOL) 325 MG tablet Take 650 mg by mouth every 6 hours as needed for Pain      pravastatin (PRAVACHOL) 80 MG tablet Take 1 tablet by mouth daily 30 tablet 0    omeprazole (PRILOSEC) 20 MG capsule Take 1 capsule by mouth every morning (before breakfast) 90 capsule 3    Multiple Vitamin (MULTI VITAMIN MENS PO) Take 1 tablet by mouth daily       aspirin 81 MG EC tablet Take 81 mg by mouth nightly        No current facility-administered medications for this visit. [facility]   Review of Systems   Constitutional: Negative for chills, fever, malaise/fatigue and weight loss. HENT: Negative. Eyes: Negative. Respiratory: Negative for cough, hemoptysis, sputum production, shortness of breath and wheezing. Cardiovascular: Negative for chest pain, palpitations and leg swelling. Gastrointestinal: Negative for abdominal pain, diarrhea, nausea and vomiting. Genitourinary: Negative. Musculoskeletal: Negative. Skin: Negative. Neurological: Negative. Endo/Heme/Allergies: Does not bruise/bleed easily. Psychiatric/Behavioral: Negative for depression and suicidal ideas.         Physical exam   /64   Pulse 65   Ht 5' 8.5\" (1.74 m)   Wt 191 lb 3.2 oz (86.7 kg)   SpO2 94% Comment: RA at rest  BMI 28.65 kg/m² Physical Exam   Constitutional: He is oriented to person, place, and time and well-developed, well-nourished, and in no distress. HENT:   Head: Normocephalic and atraumatic. Mouth/Throat: Oropharynx is clear and moist.   Eyes: Pupils are equal, round, and reactive to light. Neck: Neck supple. No JVD present. No tracheal deviation present. Cardiovascular: Normal rate and regular rhythm. No murmur heard. Pulmonary/Chest: Effort normal. No respiratory distress. He has no wheezes. He has no rales. He exhibits no tenderness. Abdominal: Soft. Bowel sounds are normal. He exhibits no distension. There is no tenderness. Musculoskeletal: He exhibits no edema. Neurological: He is alert and oriented to person, place, and time. Skin: Skin is warm and dry. Psychiatric: Mood and affect normal.   Nursing note and vitals reviewed. Test results   Lung Nodule Screening     [] Qualifies    [x] Does not qualify   [] Declined    [] Completed    FEV1 has decreased from 2017 from 45% now 37%   FEV 25-75% has remained same     F           Assessment      Diagnosis Orders   1. COPD, severe ([address]  albuterol sulfate  (90 Base) MCG/ACT inhaler   2. ALANA on CPAP     3. History of bacterial pneumonia      due to [address] in    4. Lung nodule, solitary      right lunmm stable for 2 years (originally seen )    5. History of tobacco abuse     6. Occupational exposure to dust     7. Chronic combined systolic and diastolic congestive heart failure (HCC)      EF 25%          Plan   Declining FEV1 however asymptomatic  Discussed pulmonary rehab.   Patient lives [place] min from [facility] and declines rehab at this time  On good meds: continue Spiriva and Dulera  Refill Albuterol HFA since patient's is   Will continue to monitor spirometry yearly   Keep recommended follow ups with cardiology for CHF optimization  Keep f/u's with our sleep clinic for ALANA management    Will see Justin Tabares back in: 6 months    Electronically signed by BLANKA Manzano CNP on 7/9/2018 at 10:53 AM  7/9/2018

## 2018-07-13 ENCOUNTER — OFFICE VISIT (OUTPATIENT)
Dept: PULMONOLOGY | Age: 68
End: 2018-07-13
Payer: MEDICARE

## 2018-07-13 VITALS
OXYGEN SATURATION: 96 % | WEIGHT: 193.2 LBS | SYSTOLIC BLOOD PRESSURE: 104 MMHG | HEART RATE: 78 BPM | HEIGHT: 68 IN | DIASTOLIC BLOOD PRESSURE: 60 MMHG | BODY MASS INDEX: 29.28 KG/M2

## 2018-07-13 DIAGNOSIS — G47.33 OSA (OBSTRUCTIVE SLEEP APNEA): Primary | ICD-10-CM

## 2018-07-13 PROCEDURE — 99213 OFFICE O/P EST LOW 20 MIN: CPT | Performed by: PHYSICIAN ASSISTANT

## 2018-07-13 ASSESSMENT — ENCOUNTER SYMPTOMS
HEARTBURN: 0
EYES NEGATIVE: 1
ORTHOPNEA: 0
SPUTUM PRODUCTION: 0
SHORTNESS OF BREATH: 0
GASTROINTESTINAL NEGATIVE: 1
COUGH: 0
NAUSEA: 0
SINUS PAIN: 0
SORE THROAT: 0
WHEEZING: 0
RESPIRATORY NEGATIVE: 1

## 2018-07-20 ENCOUNTER — HOSPITAL ENCOUNTER (OUTPATIENT)
Age: 68
Discharge: HOME | End: 2018-07-20
Payer: MEDICARE

## 2018-07-20 LAB
AVERAGE GLUCOSE: 231 MG/DL (ref 70–126)
GLUCOSE, TWO-HOUR POSTPRANDIAL: 260 MG/DL (ref 70–108)
HBA1C MFR BLD: 9.7 % (ref 4.4–6.4)
THYROXINE (T4): 9 UG/DL (ref 4.5–12)
TSH SERPL DL<=0.05 MIU/L-ACNC: 3.51 UIU/ML (ref 0.4–4.2)

## 2018-07-20 PROCEDURE — 84436 ASSAY OF TOTAL THYROXINE: CPT

## 2018-07-20 PROCEDURE — 36415 COLL VENOUS BLD VENIPUNCTURE: CPT

## 2018-07-20 PROCEDURE — 84443 ASSAY THYROID STIM HORMONE: CPT

## 2018-07-20 PROCEDURE — 83036 HEMOGLOBIN GLYCOSYLATED A1C: CPT

## 2018-07-20 PROCEDURE — 82947 ASSAY GLUCOSE BLOOD QUANT: CPT

## 2018-08-07 ENCOUNTER — HOSPITAL ENCOUNTER (OUTPATIENT)
Age: 68
Discharge: HOME | End: 2018-08-07
Payer: MEDICARE

## 2018-08-07 LAB
ALBUMIN SERPL-MCNC: 3.8 G/DL (ref 3.5–5.1)
AMORPHOUS: NORMAL
ANION GAP SERPL CALCULATED.3IONS-SCNC: 14 MEQ/L (ref 8–16)
BACTERIA: NORMAL
BILIRUBIN URINE: NEGATIVE
BLOOD, URINE: NEGATIVE
BUN BLDV-MCNC: 83 MG/DL (ref 7–22)
CALCIUM SERPL-MCNC: 9.3 MG/DL (ref 8.5–10.5)
CASTS UA: NORMAL /LPF
CHARACTER, URINE: CLEAR
CHLORIDE BLD-SCNC: 99 MEQ/L (ref 98–111)
CO2: 27 MEQ/L (ref 23–33)
COLOR: YELLOW
CREAT SERPL-MCNC: 2.5 MG/DL (ref 0.4–1.2)
CREATININE, URINE: 31.1 MG/DL
CRYSTALS, UA: NORMAL
EPITHELIAL CELLS, UA: NORMAL /HPF
ERYTHROCYTE [DISTWIDTH] IN BLOOD BY AUTOMATED COUNT: 12.4 % (ref 11.5–14.5)
ERYTHROCYTE [DISTWIDTH] IN BLOOD BY AUTOMATED COUNT: 40.3 FL (ref 35–45)
GFR SERPL CREATININE-BSD FRML MDRD: 26 ML/MIN/1.73M2
GLUCOSE BLD-MCNC: 160 MG/DL (ref 70–108)
GLUCOSE, URINE: NEGATIVE MG/DL
HCT VFR BLD CALC: 34.7 % (ref 42–52)
HEMOGLOBIN: 11.3 GM/DL (ref 14–18)
KETONES, URINE: NEGATIVE
LEUKOCYTE ESTERASE, URINE: ABNORMAL
MAGNESIUM: 2.3 MG/DL (ref 1.6–2.4)
MCH RBC QN AUTO: 28.9 PG (ref 26–33)
MCHC RBC AUTO-ENTMCNC: 32.6 GM/DL (ref 32.2–35.5)
MCV RBC AUTO: 88.7 FL (ref 80–94)
MICROALBUMIN UR-MCNC: < 1.2 MG/DL
MICROALBUMIN/CREAT UR-RTO: 39 MG/G (ref 0–30)
MUCUS: NORMAL
NITRITE, URINE: NEGATIVE
PH UA: 5 (ref 5–9)
PHOSPHORUS: 5.3 MG/DL (ref 2.4–4.7)
PLATELET # BLD: 367 THOU/MM3 (ref 130–400)
PMV BLD AUTO: 10 FL (ref 9.4–12.4)
POTASSIUM SERPL-SCNC: 5.6 MEQ/L (ref 3.5–5.2)
PROTEIN UA: NEGATIVE MG/DL
PTH INTACT: 82.1 PG/ML (ref 15–65)
RBC # BLD: 3.91 MILL/MM3 (ref 4.7–6.1)
RBC URINE: NORMAL /HPF
SODIUM BLD-SCNC: 140 MEQ/L (ref 135–145)
SPECIFIC GRAVITY UA: < 1.005 (ref 1–1.03)
URIC ACID: 10.9 MG/DL (ref 3.7–7)
UROBILINOGEN, URINE: 0.2 EU/DL (ref 0.2–1)
WBC # BLD: 5.5 THOU/MM3 (ref 4.8–10.8)
WBC UA: NORMAL /HPF

## 2018-08-07 PROCEDURE — 85027 COMPLETE CBC AUTOMATED: CPT

## 2018-08-07 PROCEDURE — 83735 ASSAY OF MAGNESIUM: CPT

## 2018-08-07 PROCEDURE — 82043 UR ALBUMIN QUANTITATIVE: CPT

## 2018-08-07 PROCEDURE — 84550 ASSAY OF BLOOD/URIC ACID: CPT

## 2018-08-07 PROCEDURE — 83970 ASSAY OF PARATHORMONE: CPT

## 2018-08-07 PROCEDURE — 84100 ASSAY OF PHOSPHORUS: CPT

## 2018-08-07 PROCEDURE — 80048 BASIC METABOLIC PNL TOTAL CA: CPT

## 2018-08-07 PROCEDURE — 36415 COLL VENOUS BLD VENIPUNCTURE: CPT

## 2018-08-07 PROCEDURE — 81003 URINALYSIS AUTO W/O SCOPE: CPT

## 2018-08-07 PROCEDURE — 82040 ASSAY OF SERUM ALBUMIN: CPT

## 2018-08-07 PROCEDURE — 81001 URINALYSIS AUTO W/SCOPE: CPT

## 2018-10-23 ENCOUNTER — TELEPHONE (OUTPATIENT)
Dept: PULMONOLOGY | Age: 68
End: 2018-10-23

## 2018-10-23 ENCOUNTER — OFFICE VISIT (OUTPATIENT)
Dept: PULMONOLOGY | Age: 68
End: 2018-10-23
Payer: MEDICARE

## 2018-10-23 VITALS
TEMPERATURE: 97.1 F | WEIGHT: 203.2 LBS | OXYGEN SATURATION: 95 % | HEIGHT: 68 IN | BODY MASS INDEX: 30.8 KG/M2 | HEART RATE: 85 BPM | SYSTOLIC BLOOD PRESSURE: 147 MMHG | DIASTOLIC BLOOD PRESSURE: 82 MMHG

## 2018-10-23 DIAGNOSIS — J44.1 COPD EXACERBATION (HCC): Primary | ICD-10-CM

## 2018-10-23 DIAGNOSIS — J44.9 STAGE 3 SEVERE COPD BY GOLD CLASSIFICATION (HCC): Primary | ICD-10-CM

## 2018-10-23 DIAGNOSIS — J44.9 COPD, SEVERE (HCC): ICD-10-CM

## 2018-10-23 PROCEDURE — 99214 OFFICE O/P EST MOD 30 MIN: CPT | Performed by: INTERNAL MEDICINE

## 2018-10-23 PROCEDURE — 94618 PULMONARY STRESS TESTING: CPT | Performed by: INTERNAL MEDICINE

## 2018-10-23 RX ORDER — PREDNISONE 10 MG/1
TABLET ORAL
Qty: 30 TABLET | Refills: 0 | Status: SHIPPED | OUTPATIENT
Start: 2018-10-23 | End: 2018-11-02

## 2018-10-23 NOTE — PROGRESS NOTES
use. 1 Inhaler 11    tiotropium (SPIRIVA HANDIHALER) 18 MCG inhalation capsule Inhale 1 capsule into the lungs daily 30 capsule 11    predniSONE (DELTASONE) 10 MG tablet Prednisone 10mg PO. To take 4tab for 3days, then 3tab for 3days, then 2tab for 3days, [id number] for 3days . 30 tablet 0    albuterol sulfate  (90 Base) MCG/ACT inhaler Inhale 2 puffs into the lungs every 6 hours as needed for Wheezing (Patient taking differently: Inhale 2 puffs into the lungs every 6 hours as needed for Wheezing ventolin) 1 Inhaler 11    carvedilol (COREG) 25 MG tablet Take 1.5 tablets by mouth 2 times daily (with meals) 90 tablet 0    hydrALAZINE (APRESOLINE) 25 MG tablet Take 1 tablet by mouth 3 times daily 90 tablet 0    Insulin Disposable Pump (V-GO 30) KIT Humalog insulin   2 units/click  Pt uses 1-2 clicks prior to breakfast  6 clicks prior to lunch  6 clicks prior to evening meal   1-2 clicks with a snack      CINNAMON PO Take 2 capsules by mouth daily      Ascorbic Acid (VITAMIN C) 500 MG tablet Take 500 mg by mouth daily      B Complex-Folic Acid (B COMPLEX PLUS PO) Take 1 tablet by mouth daily      glucose monitoring kit (FREESTYLE) monitoring kit Need one glucometer and 100 testing strips for 3-4 times per day testing.   Diagnosis is diabetes type 2 1 kit 0    Lancets MISC 1 each by Does not apply route daily 100 each 0    Insulin Syringe-Needle U-100 (INSULIN SYRINGE .3CC/31GX5/16\") 31G X 5/16\" 0.3 ML MISC 1 each by Does not apply route daily 100 each 0    CPAP Machine MISC by Does not apply route Please change CPAP pressure to 13 cm H20. 1 each 0    Exenatide (BYDUREON) 2 MG PEN Inject 2 mg into the skin once a week Takes on Sunday       pravastatin (PRAVACHOL) 80 MG tablet Take 1 tablet by mouth daily 30 tablet 0    omeprazole (PRILOSEC) 20 MG capsule Take 1 capsule by mouth every morning (before breakfast) 90 capsule 3    Multiple Vitamin (MULTI VITAMIN MENS PO) Take 1 tablet by mouth daily  levothyroxine (SYNTHROID) 25 MCG tablet Take 25 mcg by mouth daily      acetaminophen (TYLENOL) 325 MG tablet Take 650 mg by mouth every 6 hours as needed for Pain      aspirin 81 MG EC tablet Take 81 mg by mouth nightly        No current facility-administered medications for this visit. Family History   Problem Relation Age of Onset    Heart Disease Father     Cancer Brother 52        type unknown    Colon Cancer Neg Hx     Inflam Bowel Dis Neg Hx           Vitals:   BP (!) 147/82   Pulse 85   Temp 97.1 °F (36.2 °C)   Ht 5' 8\" (1.727 m)   Wt 203 lb 3.2 oz (92.2 kg)   SpO2 95% Comment: room air at rest  BMI 30.90 kg/m²         Objective:   Physical Exam   Nursing note and vitals reviewed. Constitutional: Patient appears moderately built and moderately nourished. No distress. Patient is oriented to person, place, and time. HENT:   Head: Normocephalic and atraumatic. Right Ear: External ear normal.   Left Ear: External ear normal.   Mouth/Throat: Oropharynx is clear and moist.  No oral thrush. Eyes: Conjunctivae are normal. Pupils are equal, round, and reactive to light. No scleral icterus. Neck: Neck supple. No JVD present. No tracheal deviation present. Cardiovascular: Normal rate, regular rhythm, normal heart sounds. No murmur heard. Pulmonary/Chest: Effort normal and breath sounds normal. No stridor. No respiratory distress. Bilateral expiratory wheezes. No rales. Patient exhibits no tenderness. Abdominal: Soft. Patient exhibits no distension. No tenderness. Musculoskeletal: Normal range of motion. Extremities: Patient exhibits bilateral leg edema 1+ and no tenderness. Lymphadenopathy:  No cervical adenopathy. Neurological: Patient is alert and oriented to person, place, and time. Skin: Skin is warm and dry. Patient is not diaphoretic. Psychiatric: Patient  has a normal mood and affect.  Patient behavior is normal.     Diagnostic Data:    CT chest:  Accession Number: spirometry done in the past.    CT chest with out contrast:  Jan 23, 2017  PROCEDURE: CT CHEST WO CONTRAST    To the limits of noncontrast imaging, there is no strong evidence of pathologically enlarged hilar lymph nodes. Mildly prominent paratracheal lymph nodes are less conspicuous, previously measuring 16 mm short axis, currently having a maximal short axis diameter of 10 mm. INTERVAL RESOLUTION OF PREVIOUSLY DEMONSTRATED PLEURAL EFFUSIONS. NO EVIDENCE OF SEQUELAE. IMPROVED APPEARANCE OF THE LUNG PARENCHYMA AND THE OVERALL APPEARANCE OF THE CHEST AS A RESULT. INCIDENTAL FINDINGS AS DISCUSSED. CXR:  Sep 13, 2017  PROCEDURE: XR CHEST STANDARD TWO VW  1. Normal heart size. Right arm PICC line tip in right atrium. 2. Small effusion left side. Mild pneumonia/pulmonary edema left perihilar region and both lung bases. 3. Overall appearance of chest improved from prior.       Sep 22, 2017  PROCEDURE: XR CHEST STANDARD TWO VW  1. Improving small left-sided pleural effusion with adjacent atelectasis/infiltrate. 2. Chronic lung disease. Spirometry: 2018          His FEV1 decreased from previous spirometry done in the past.    Six Minute Walk Test done on 10/23/18 at 11:48 AM    Fabienne Echavarria 1950    Six minute walk test done in my office today by my medical assistant Miss. Hammth: Michael's oxygen saturation at rest on room air was 94%. His oxygen saturation dropped to 90% on room air with exertion after 6 minute 0 seconds. Patient don't need any oxygen supplementation. Assessment:  -Severe COPD- Not under good control with current inhalers. -COPD exacerbtaion   -Worsening of dyspnea due to CHF Vs severe COPD exacertion. -CHF with systolic dysfunction with EF 25%. -Recurrent pleural effusion left > right. S/p Thoracentesis c/w transudate. resolved. -5mm lung nodule in the anterior segment right upper lobe now measuring 6mm in size-stable for 2 years.  This nodule was initially found on CT dated

## 2018-10-24 DIAGNOSIS — J44.9 STAGE 3 SEVERE COPD BY GOLD CLASSIFICATION (HCC): ICD-10-CM

## 2018-10-25 ENCOUNTER — HOSPITAL ENCOUNTER (OUTPATIENT)
Age: 68
Discharge: HOME | End: 2018-10-25
Payer: MEDICARE

## 2018-10-25 LAB
ALBUMIN SERPL-MCNC: 3.8 G/DL (ref 3.5–5.1)
ANION GAP SERPL CALCULATED.3IONS-SCNC: 17 MEQ/L (ref 8–16)
BUN BLDV-MCNC: 35 MG/DL (ref 7–22)
CALCIUM SERPL-MCNC: 8.4 MG/DL (ref 8.5–10.5)
CHLORIDE BLD-SCNC: 92 MEQ/L (ref 98–111)
CO2: 25 MEQ/L (ref 23–33)
CREAT SERPL-MCNC: 1.6 MG/DL (ref 0.4–1.2)
ERYTHROCYTE [DISTWIDTH] IN BLOOD BY AUTOMATED COUNT: 12.8 % (ref 11.5–14.5)
ERYTHROCYTE [DISTWIDTH] IN BLOOD BY AUTOMATED COUNT: 43.9 FL (ref 35–45)
GFR SERPL CREATININE-BSD FRML MDRD: 43 ML/MIN/1.73M2
GLUCOSE BLD-MCNC: 680 MG/DL (ref 70–108)
HCT VFR BLD CALC: 36.2 % (ref 42–52)
HEMOGLOBIN: 10.8 GM/DL (ref 14–18)
MAGNESIUM: 2.2 MG/DL (ref 1.6–2.4)
MCH RBC QN AUTO: 28 PG (ref 26–33)
MCHC RBC AUTO-ENTMCNC: 29.8 GM/DL (ref 32.2–35.5)
MCV RBC AUTO: 93.8 FL (ref 80–94)
PHOSPHORUS: 3.4 MG/DL (ref 2.4–4.7)
PLATELET # BLD: 428 THOU/MM3 (ref 130–400)
PMV BLD AUTO: 9.8 FL (ref 9.4–12.4)
POTASSIUM SERPL-SCNC: 5 MEQ/L (ref 3.5–5.2)
PTH INTACT: 38.5 PG/ML (ref 15–65)
RBC # BLD: 3.86 MILL/MM3 (ref 4.7–6.1)
SODIUM BLD-SCNC: 134 MEQ/L (ref 135–145)
URIC ACID: 6.7 MG/DL (ref 3.7–7)
VITAMIN D 25-HYDROXY: 32 NG/ML (ref 30–100)
WBC # BLD: 12.1 THOU/MM3 (ref 4.8–10.8)

## 2018-10-25 PROCEDURE — 84100 ASSAY OF PHOSPHORUS: CPT

## 2018-10-25 PROCEDURE — 82040 ASSAY OF SERUM ALBUMIN: CPT

## 2018-10-25 PROCEDURE — 85027 COMPLETE CBC AUTOMATED: CPT

## 2018-10-25 PROCEDURE — 83970 ASSAY OF PARATHORMONE: CPT

## 2018-10-25 PROCEDURE — 84550 ASSAY OF BLOOD/URIC ACID: CPT

## 2018-10-25 PROCEDURE — 36415 COLL VENOUS BLD VENIPUNCTURE: CPT

## 2018-10-25 PROCEDURE — 82306 VITAMIN D 25 HYDROXY: CPT

## 2018-10-25 PROCEDURE — 80048 BASIC METABOLIC PNL TOTAL CA: CPT

## 2018-10-25 PROCEDURE — 83735 ASSAY OF MAGNESIUM: CPT

## 2018-11-02 ENCOUNTER — HOSPITAL ENCOUNTER (OUTPATIENT)
Age: 68
Discharge: HOME | End: 2018-11-02
Payer: MEDICARE

## 2018-11-02 LAB
AVERAGE GLUCOSE: 231 MG/DL (ref 70–126)
GLUCOSE BLD-MCNC: 115 MG/DL (ref 70–108)
HBA1C MFR BLD: 9.7 % (ref 4.4–6.4)
TSH SERPL DL<=0.05 MIU/L-ACNC: 2.66 UIU/ML (ref 0.4–4.2)

## 2018-11-02 PROCEDURE — 84436 ASSAY OF TOTAL THYROXINE: CPT

## 2018-11-02 PROCEDURE — 36415 COLL VENOUS BLD VENIPUNCTURE: CPT

## 2018-11-02 PROCEDURE — 84443 ASSAY THYROID STIM HORMONE: CPT

## 2018-11-02 PROCEDURE — 83036 HEMOGLOBIN GLYCOSYLATED A1C: CPT

## 2018-11-02 PROCEDURE — 82947 ASSAY GLUCOSE BLOOD QUANT: CPT

## 2018-11-03 LAB — THYROXINE (T4): 7.2 UG/DL (ref 4.5–12)

## 2019-01-01 ENCOUNTER — HOSPITAL ENCOUNTER (OUTPATIENT)
Age: 69
Discharge: HOME | End: 2019-07-23
Payer: MEDICARE

## 2019-01-01 ENCOUNTER — HOSPITAL ENCOUNTER (OUTPATIENT)
Dept: GENERAL RADIOLOGY | Age: 69
Discharge: HOME | End: 2019-10-22
Payer: MEDICARE

## 2019-01-01 ENCOUNTER — HOSPITAL ENCOUNTER (OUTPATIENT)
Age: 69
Discharge: HOME | End: 2019-03-16
Payer: MEDICARE

## 2019-01-01 ENCOUNTER — OFFICE VISIT (OUTPATIENT)
Dept: PULMONOLOGY | Age: 69
End: 2019-01-01
Payer: MEDICARE

## 2019-01-01 ENCOUNTER — HOSPITAL ENCOUNTER (OUTPATIENT)
Age: 69
Discharge: HOME | End: 2019-10-22
Payer: MEDICARE

## 2019-01-01 ENCOUNTER — HOSPITAL ENCOUNTER (EMERGENCY)
Age: 69
Discharge: HOME | End: 2019-04-05
Attending: EMERGENCY MEDICINE
Payer: MEDICARE

## 2019-01-01 ENCOUNTER — HOSPITAL ENCOUNTER (OUTPATIENT)
Age: 69
Discharge: HOME | End: 2019-09-26
Payer: MEDICARE

## 2019-01-01 ENCOUNTER — HOSPITAL ENCOUNTER (OUTPATIENT)
Age: 69
Discharge: HOME | End: 2019-09-05
Payer: MEDICARE

## 2019-01-01 ENCOUNTER — HOSPITAL ENCOUNTER (OUTPATIENT)
Age: 69
Discharge: HOME | End: 2019-06-05
Payer: MEDICARE

## 2019-01-01 ENCOUNTER — NURSE ONLY (OUTPATIENT)
Dept: LAB | Age: 69
End: 2019-01-01

## 2019-01-01 ENCOUNTER — HOSPITAL ENCOUNTER (OUTPATIENT)
Age: 69
Discharge: HOME | End: 2019-09-30
Payer: MEDICARE

## 2019-01-01 ENCOUNTER — HOSPITAL ENCOUNTER (OUTPATIENT)
Age: 69
Discharge: HOME | End: 2019-09-10
Payer: MEDICARE

## 2019-01-01 ENCOUNTER — HOSPITAL ENCOUNTER (OUTPATIENT)
Dept: GENERAL RADIOLOGY | Age: 69
Discharge: HOME | End: 2019-09-05
Payer: MEDICARE

## 2019-01-01 ENCOUNTER — HOSPITAL ENCOUNTER (OUTPATIENT)
Dept: GENERAL RADIOLOGY | Age: 69
Discharge: HOME | End: 2019-09-10
Payer: MEDICARE

## 2019-01-01 ENCOUNTER — HOSPITAL ENCOUNTER (OUTPATIENT)
Age: 69
Discharge: HOME | End: 2019-05-28
Payer: MEDICARE

## 2019-01-01 VITALS
HEART RATE: 74 BPM | WEIGHT: 231.6 LBS | OXYGEN SATURATION: 93 % | HEIGHT: 69 IN | BODY MASS INDEX: 34.3 KG/M2 | TEMPERATURE: 98.3 F | SYSTOLIC BLOOD PRESSURE: 124 MMHG | DIASTOLIC BLOOD PRESSURE: 70 MMHG

## 2019-01-01 VITALS
OXYGEN SATURATION: 95 % | HEIGHT: 68 IN | SYSTOLIC BLOOD PRESSURE: 118 MMHG | DIASTOLIC BLOOD PRESSURE: 80 MMHG | WEIGHT: 207 LBS | HEART RATE: 68 BPM | BODY MASS INDEX: 31.37 KG/M2 | TEMPERATURE: 96.5 F

## 2019-01-01 VITALS
OXYGEN SATURATION: 97 % | BODY MASS INDEX: 33.44 KG/M2 | HEART RATE: 63 BPM | DIASTOLIC BLOOD PRESSURE: 64 MMHG | SYSTOLIC BLOOD PRESSURE: 112 MMHG | WEIGHT: 225.8 LBS | HEIGHT: 69 IN | TEMPERATURE: 97.8 F

## 2019-01-01 VITALS
WEIGHT: 206 LBS | DIASTOLIC BLOOD PRESSURE: 84 MMHG | BODY MASS INDEX: 30.51 KG/M2 | SYSTOLIC BLOOD PRESSURE: 152 MMHG | OXYGEN SATURATION: 100 % | TEMPERATURE: 97.6 F | RESPIRATION RATE: 16 BRPM | HEIGHT: 69 IN | HEART RATE: 70 BPM

## 2019-01-01 DIAGNOSIS — J44.1 COPD EXACERBATION (HCC): ICD-10-CM

## 2019-01-01 DIAGNOSIS — R91.1 LUNG NODULE, SOLITARY: ICD-10-CM

## 2019-01-01 DIAGNOSIS — I50.42 CHRONIC COMBINED SYSTOLIC AND DIASTOLIC CONGESTIVE HEART FAILURE (HCC): ICD-10-CM

## 2019-01-01 DIAGNOSIS — Z87.891 HISTORY OF TOBACCO ABUSE: ICD-10-CM

## 2019-01-01 DIAGNOSIS — G47.33 OSA (OBSTRUCTIVE SLEEP APNEA): ICD-10-CM

## 2019-01-01 DIAGNOSIS — R06.00 DYSPNEA, UNSPECIFIED TYPE: ICD-10-CM

## 2019-01-01 DIAGNOSIS — J44.9 COPD, SEVERE (HCC): Primary | ICD-10-CM

## 2019-01-01 DIAGNOSIS — J44.1 COPD EXACERBATION (HCC): Primary | ICD-10-CM

## 2019-01-01 DIAGNOSIS — J44.9 STAGE 3 SEVERE COPD BY GOLD CLASSIFICATION (HCC): ICD-10-CM

## 2019-01-01 DIAGNOSIS — I10 ESSENTIAL HYPERTENSION: ICD-10-CM

## 2019-01-01 DIAGNOSIS — E16.2 HYPOGLYCEMIA: Primary | ICD-10-CM

## 2019-01-01 DIAGNOSIS — J96.12 CHRONIC RESPIRATORY FAILURE WITH HYPOXIA AND HYPERCAPNIA (HCC): ICD-10-CM

## 2019-01-01 DIAGNOSIS — R52 PAIN: ICD-10-CM

## 2019-01-01 DIAGNOSIS — J44.9 COPD, SEVERE (HCC): ICD-10-CM

## 2019-01-01 DIAGNOSIS — J96.11 CHRONIC RESPIRATORY FAILURE WITH HYPOXIA AND HYPERCAPNIA (HCC): ICD-10-CM

## 2019-01-01 LAB
ALBUMIN SERPL-MCNC: 3.5 GM/DL (ref 3.4–5)
ALBUMIN SERPL-MCNC: 3.9 G/DL (ref 3.5–5.1)
ALBUMIN SERPL-MCNC: 4 G/DL (ref 3.5–5.1)
ALP BLD-CCNC: 103 U/L (ref 46–116)
ALP BLD-CCNC: 110 U/L (ref 38–126)
ALT SERPL-CCNC: 21 U/L (ref 11–66)
ALT SERPL-CCNC: 28 U/L (ref 14–63)
ALT SERPL-CCNC: 32 U/L (ref 11–66)
AMORPHOUS: NORMAL
ANION GAP SERPL CALCULATED.3IONS-SCNC: 12 MEQ/L (ref 8–16)
ANION GAP SERPL CALCULATED.3IONS-SCNC: 14 MEQ/L (ref 8–16)
ANION GAP SERPL CALCULATED.3IONS-SCNC: 14 MEQ/L (ref 8–16)
ANION GAP SERPL CALCULATED.3IONS-SCNC: 16 MEQ/L (ref 8–16)
ANION GAP SERPL CALCULATED.3IONS-SCNC: 25 MEQ/L (ref 8–16)
ANION GAP: 7 MEQ/L (ref 8–16)
AST SERPL-CCNC: 22 U/L (ref 15–37)
AST SERPL-CCNC: 30 U/L (ref 5–40)
AVERAGE GLUCOSE: 213 MG/DL (ref 70–126)
AVERAGE GLUCOSE: 216 MG/DL (ref 70–126)
AVERAGE GLUCOSE: 231 MG/DL (ref 70–126)
AVERAGE GLUCOSE: 234 MG/DL (ref 70–126)
BACTERIA: NORMAL
BASOPHILS # BLD: 0.5 % (ref 0–3)
BASOPHILS # BLD: 0.6 %
BASOPHILS # BLD: 1 %
BASOPHILS ABSOLUTE: 0.1 THOU/MM3 (ref 0–0.1)
BASOPHILS ABSOLUTE: 0.1 THOU/MM3 (ref 0–0.1)
BILIRUB SERPL-MCNC: 0.3 MG/DL (ref 0.2–1)
BILIRUB SERPL-MCNC: 0.5 MG/DL (ref 0.3–1.2)
BILIRUBIN DIRECT: < 0.2 MG/DL (ref 0–0.3)
BILIRUBIN URINE: NEGATIVE
BLOOD, URINE: NEGATIVE
BUN BLDV-MCNC: 28 MG/DL (ref 7–22)
BUN BLDV-MCNC: 33 MG/DL (ref 7–22)
BUN BLDV-MCNC: 39 MG/DL (ref 7–22)
BUN BLDV-MCNC: 40 MG/DL (ref 7–18)
BUN BLDV-MCNC: 47 MG/DL (ref 7–22)
BUN BLDV-MCNC: 54 MG/DL (ref 7–22)
CALCIUM SERPL-MCNC: 8.6 MG/DL (ref 8.5–10.5)
CALCIUM SERPL-MCNC: 9 MG/DL (ref 8.5–10.5)
CALCIUM SERPL-MCNC: 9.3 MG/DL (ref 8.5–10.5)
CALCIUM SERPL-MCNC: 9.4 MG/DL (ref 8.5–10.5)
CALCIUM SERPL-MCNC: 9.6 MG/DL (ref 8.5–10.5)
CASTS UA: NORMAL /LPF
CHARACTER, URINE: ABNORMAL
CHLORIDE BLD-SCNC: 100 MEQ/L (ref 98–111)
CHLORIDE BLD-SCNC: 101 MEQ/L (ref 98–111)
CHLORIDE BLD-SCNC: 102 MEQ/L (ref 98–107)
CHLORIDE BLD-SCNC: 103 MEQ/L (ref 98–111)
CHLORIDE BLD-SCNC: 96 MEQ/L (ref 98–111)
CHLORIDE BLD-SCNC: 97 MEQ/L (ref 98–111)
CHOLESTEROL, FASTING: 187 MG/DL (ref 100–199)
CHOLESTEROL, TOTAL: 176 MG/DL (ref 100–199)
CO2: 19 MEQ/L (ref 23–33)
CO2: 27 MEQ/L (ref 23–33)
CO2: 28 MEQ/L (ref 23–33)
CO2: 30 MEQ/L (ref 23–33)
CO2: 32 MEQ/L (ref 23–33)
CO2: 33 MEQ/L (ref 21–32)
COLOR: YELLOW
CREAT SERPL-MCNC: 1.6 MG/DL (ref 0.4–1.2)
CREAT SERPL-MCNC: 1.6 MG/DL (ref 0.4–1.2)
CREAT SERPL-MCNC: 1.7 MG/DL (ref 0.4–1.2)
CREAT SERPL-MCNC: 1.7 MG/DL (ref 0.4–1.2)
CREAT SERPL-MCNC: 1.8 MG/DL (ref 0.6–1.3)
CREAT SERPL-MCNC: 1.9 MG/DL (ref 0.4–1.2)
CREATININE, URINE: 30.9 MG/DL
CREATININE, URINE: 36.3 MG/DL
CRYSTALS, UA: NORMAL
EOSINOPHIL # BLD: 12.7 %
EOSINOPHIL # BLD: 3.8 %
EOSINOPHILS ABSOLUTE: 0.4 THOU/MM3 (ref 0–0.4)
EOSINOPHILS ABSOLUTE: 1.6 THOU/MM3 (ref 0–0.4)
EOSINOPHILS RELATIVE PERCENT: 3.2 % (ref 0–4)
EPITHELIAL CELLS, UA: NORMAL /HPF
ERYTHROCYTE [DISTWIDTH] IN BLOOD BY AUTOMATED COUNT: 13.2 % (ref 11.5–14.5)
ERYTHROCYTE [DISTWIDTH] IN BLOOD BY AUTOMATED COUNT: 13.8 % (ref 11.5–14.5)
ERYTHROCYTE [DISTWIDTH] IN BLOOD BY AUTOMATED COUNT: 14.2 % (ref 11.5–14.5)
ERYTHROCYTE [DISTWIDTH] IN BLOOD BY AUTOMATED COUNT: 44 FL (ref 35–45)
ERYTHROCYTE [DISTWIDTH] IN BLOOD BY AUTOMATED COUNT: 47.1 FL (ref 35–45)
ERYTHROCYTE [DISTWIDTH] IN BLOOD BY AUTOMATED COUNT: 47.4 FL (ref 35–45)
GFR SERPL CREATININE-BSD FRML MDRD: 35 ML/MIN/1.73M2
GFR SERPL CREATININE-BSD FRML MDRD: 40 ML/MIN/1.73M2
GFR SERPL CREATININE-BSD FRML MDRD: 40 ML/MIN/1.73M2
GFR SERPL CREATININE-BSD FRML MDRD: 43 ML/MIN/1.73M2
GFR SERPL CREATININE-BSD FRML MDRD: 43 ML/MIN/1.73M2
GFR, ESTIMATED: 40 ML/MIN/1.73M2
GLUCOSE BLD-MCNC: 104 MG/DL (ref 74–106)
GLUCOSE BLD-MCNC: 106 MG/DL (ref 70–108)
GLUCOSE BLD-MCNC: 106 MG/DL (ref 70–108)
GLUCOSE BLD-MCNC: 120 MG/DL (ref 70–108)
GLUCOSE BLD-MCNC: 138 MG/DL (ref 70–108)
GLUCOSE BLD-MCNC: 181 MG/DL (ref 70–108)
GLUCOSE BLD-MCNC: 202 MG/DL (ref 70–108)
GLUCOSE BLD-MCNC: 264 MG/DL (ref 70–108)
GLUCOSE BLD-MCNC: 433 MG/DL (ref 70–108)
GLUCOSE FASTING: 436 MG/DL (ref 70–108)
GLUCOSE, TWO-HOUR POSTPRANDIAL: 157 MG/DL (ref 70–108)
GLUCOSE, URINE: NEGATIVE MG/DL
HBA1C MFR BLD: 9.1 % (ref 4.4–6.4)
HBA1C MFR BLD: 9.2 % (ref 4.4–6.4)
HBA1C MFR BLD: 9.7 % (ref 4.4–6.4)
HBA1C MFR BLD: 9.8 % (ref 4.4–6.4)
HCT VFR BLD CALC: 36.7 % (ref 42–52)
HCT VFR BLD CALC: 37.1 % (ref 42–52)
HCT VFR BLD CALC: 38.7 % (ref 42–52)
HCT VFR BLD CALC: 39.6 % (ref 42–52)
HDLC SERPL-MCNC: 59 MG/DL
HDLC SERPL-MCNC: 61 MG/DL
HEMOGLOBIN: 11 GM/DL (ref 14–18)
HEMOGLOBIN: 11.7 GM/DL (ref 14–18)
HEMOGLOBIN: 11.9 GM/DL (ref 14–18)
HEMOGLOBIN: 12.3 GM/DL (ref 14–18)
IMMATURE GRANS (ABS): 0.06 THOU/MM3 (ref 0–0.07)
IMMATURE GRANS (ABS): 0.15 THOU/MM3 (ref 0–0.07)
IMMATURE GRANULOCYTES: 0.5 %
IMMATURE GRANULOCYTES: 1 %
KETONES, URINE: NEGATIVE
LDL CHOLESTEROL CALCULATED: 107 MG/DL
LDL CHOLESTEROL CALCULATED: 96 MG/DL
LEUKOCYTE ESTERASE, URINE: ABNORMAL
LYMPHOCYTES # BLD: 14.7 % (ref 15–47)
LYMPHOCYTES # BLD: 4.3 %
LYMPHOCYTES # BLD: 6.2 %
LYMPHOCYTES ABSOLUTE: 0.5 THOU/MM3 (ref 1–4.8)
LYMPHOCYTES ABSOLUTE: 0.8 THOU/MM3 (ref 1–4.8)
MAGNESIUM: 2.3 MG/DL (ref 1.6–2.4)
MCH RBC QN AUTO: 27.7 PG (ref 26–33)
MCH RBC QN AUTO: 27.8 PG (ref 26–33)
MCH RBC QN AUTO: 27.9 PG (ref 26–33)
MCH RBC QN AUTO: 29.1 PG (ref 27–31)
MCHC RBC AUTO-ENTMCNC: 29.6 GM/DL (ref 32.2–35.5)
MCHC RBC AUTO-ENTMCNC: 30.1 GM/DL (ref 32.2–35.5)
MCHC RBC AUTO-ENTMCNC: 30.2 GM/DL (ref 32.2–35.5)
MCHC RBC AUTO-ENTMCNC: 33.6 GM/DL (ref 33–37)
MCV RBC AUTO: 86.5 FL (ref 80–94)
MCV RBC AUTO: 92.1 FL (ref 80–94)
MCV RBC AUTO: 92.1 FL (ref 80–94)
MCV RBC AUTO: 93.9 FL (ref 80–94)
MICROALBUMIN UR-MCNC: 12.33 MG/DL
MICROALBUMIN UR-MCNC: 6.68 MG/DL
MICROALBUMIN/CREAT UR-RTO: 184 MG/G (ref 0–30)
MICROALBUMIN/CREAT UR-RTO: 399 MG/G (ref 0–30)
MONOCYTES # BLD: 5.6 %
MONOCYTES # BLD: 7.7 %
MONOCYTES ABSOLUTE: 0.7 THOU/MM3 (ref 0.4–1.3)
MONOCYTES ABSOLUTE: 0.9 THOU/MM3 (ref 0.4–1.3)
MONOCYTES: 7.4 % (ref 0–12)
MUCUS: NORMAL
NITRITE, URINE: NEGATIVE
NUCLEATED RED BLOOD CELLS: 0 /100 WBC
NUCLEATED RED BLOOD CELLS: 0 /100 WBC
PDW BLD-RTO: 13.4 % (ref 11.5–14.5)
PH UA: 6.5 (ref 5–9)
PHOSPHORUS: 4.4 MG/DL (ref 2.4–4.7)
PLATELET # BLD: 313 THOU/MM3 (ref 130–400)
PLATELET # BLD: 327 THOU/MM3 (ref 130–400)
PLATELET # BLD: 340 THOU/MM3 (ref 130–400)
PLATELET # BLD: 446 THOU/MM3 (ref 130–400)
PMV BLD AUTO: 10.1 FL (ref 9.4–12.4)
PMV BLD AUTO: 10.3 FL (ref 9.4–12.4)
PMV BLD AUTO: 7.3 FL (ref 7.4–10.4)
PMV BLD AUTO: 9.5 FL (ref 9.4–12.4)
POC CALCIUM: 9 MG/DL (ref 8.5–10.1)
POTASSIUM SERPL-SCNC: 4.6 MEQ/L (ref 3.5–5.2)
POTASSIUM SERPL-SCNC: 4.9 MEQ/L (ref 3.5–5.1)
POTASSIUM SERPL-SCNC: 5.1 MEQ/L (ref 3.5–5.2)
POTASSIUM SERPL-SCNC: 5.3 MEQ/L (ref 3.5–5.2)
POTASSIUM SERPL-SCNC: 5.4 MEQ/L (ref 3.5–5.2)
POTASSIUM SERPL-SCNC: 5.4 MEQ/L (ref 3.5–5.2)
PROSTATE SPECIFIC ANTIGEN: 1.78 NG/ML (ref 0–1)
PROTEIN UA: 30 MG/DL
PTH INTACT: 55.2 PG/ML (ref 15–65)
RBC # BLD: 3.95 MILL/MM3 (ref 4.7–6.1)
RBC # BLD: 4.2 MILL/MM3 (ref 4.7–6.1)
RBC # BLD: 4.24 MILL/MM3 (ref 4.7–6.1)
RBC # BLD: 4.3 MILL/MM3 (ref 4.7–6.1)
RBC URINE: NORMAL /HPF
SEG NEUTROPHILS: 73.3 %
SEG NEUTROPHILS: 83.1 %
SEGMENTED NEUTROPHILS ABSOLUTE COUNT: 9.2 THOU/MM3 (ref 1.8–7.7)
SEGMENTED NEUTROPHILS ABSOLUTE COUNT: 9.3 THOU/MM3 (ref 1.8–7.7)
SEGS: 74.2 % (ref 43–75)
SODIUM BLD-SCNC: 140 MEQ/L (ref 135–145)
SODIUM BLD-SCNC: 142 MEQ/L (ref 136–145)
SODIUM BLD-SCNC: 143 MEQ/L (ref 135–145)
SODIUM BLD-SCNC: 143 MEQ/L (ref 135–145)
SODIUM BLD-SCNC: 144 MEQ/L (ref 135–145)
SODIUM BLD-SCNC: 144 MEQ/L (ref 135–145)
SPECIFIC GRAVITY UA: 1.01 (ref 1–1.03)
T4 FREE: 1 NG/DL (ref 0.93–1.76)
T4 FREE: 1.15 NG/DL (ref 0.93–1.76)
TOTAL PROTEIN: 6.6 G/DL (ref 6.1–8)
TOTAL PROTEIN: 7.3 GM/DL (ref 6.4–8.2)
TRIGL SERPL-MCNC: 94 MG/DL (ref 0–199)
TRIGLYCERIDE, FASTING: 106 MG/DL (ref 0–199)
TSH SERPL DL<=0.05 MIU/L-ACNC: 3.24 UIU/ML (ref 0.4–4.2)
TSH SERPL DL<=0.05 MIU/L-ACNC: 5.65 UIU/ML (ref 0.4–4.2)
URIC ACID: 9 MG/DL (ref 3.7–7)
UROBILINOGEN, URINE: 0.2 EU/DL (ref 0.2–1)
VITAMIN D 25-HYDROXY: 36 NG/ML (ref 30–100)
WBC # BLD: 11.2 THOU/MM3 (ref 4.8–10.8)
WBC # BLD: 12.1 THOU/MM3 (ref 4.8–10.8)
WBC # BLD: 12.6 THOU/MM3 (ref 4.8–10.8)
WBC # BLD: 7.5 THOU/MM3 (ref 4.8–10.8)
WBC UA: NORMAL /HPF

## 2019-01-01 PROCEDURE — 82040 ASSAY OF SERUM ALBUMIN: CPT

## 2019-01-01 PROCEDURE — 81001 URINALYSIS AUTO W/SCOPE: CPT

## 2019-01-01 PROCEDURE — 82043 UR ALBUMIN QUANTITATIVE: CPT

## 2019-01-01 PROCEDURE — 71046 X-RAY EXAM CHEST 2 VIEWS: CPT

## 2019-01-01 PROCEDURE — 80053 COMPREHEN METABOLIC PANEL: CPT

## 2019-01-01 PROCEDURE — 85025 COMPLETE CBC W/AUTO DIFF WBC: CPT

## 2019-01-01 PROCEDURE — 36415 COLL VENOUS BLD VENIPUNCTURE: CPT

## 2019-01-01 PROCEDURE — 84550 ASSAY OF BLOOD/URIC ACID: CPT

## 2019-01-01 PROCEDURE — 99214 OFFICE O/P EST MOD 30 MIN: CPT | Performed by: NURSE PRACTITIONER

## 2019-01-01 PROCEDURE — 84443 ASSAY THYROID STIM HORMONE: CPT

## 2019-01-01 PROCEDURE — 84100 ASSAY OF PHOSPHORUS: CPT

## 2019-01-01 PROCEDURE — 83036 HEMOGLOBIN GLYCOSYLATED A1C: CPT

## 2019-01-01 PROCEDURE — G0103 PSA SCREENING: HCPCS

## 2019-01-01 PROCEDURE — 80061 LIPID PANEL: CPT

## 2019-01-01 PROCEDURE — 80048 BASIC METABOLIC PNL TOTAL CA: CPT

## 2019-01-01 PROCEDURE — 84439 ASSAY OF FREE THYROXINE: CPT

## 2019-01-01 PROCEDURE — 85027 COMPLETE CBC AUTOMATED: CPT

## 2019-01-01 PROCEDURE — 94618 PULMONARY STRESS TESTING: CPT | Performed by: NURSE PRACTITIONER

## 2019-01-01 PROCEDURE — 99213 OFFICE O/P EST LOW 20 MIN: CPT | Performed by: NURSE PRACTITIONER

## 2019-01-01 PROCEDURE — 83970 ASSAY OF PARATHORMONE: CPT

## 2019-01-01 PROCEDURE — 99285 EMERGENCY DEPT VISIT HI MDM: CPT

## 2019-01-01 PROCEDURE — 81003 URINALYSIS AUTO W/O SCOPE: CPT

## 2019-01-01 PROCEDURE — 82947 ASSAY GLUCOSE BLOOD QUANT: CPT

## 2019-01-01 PROCEDURE — 82306 VITAMIN D 25 HYDROXY: CPT

## 2019-01-01 PROCEDURE — 74018 RADEX ABDOMEN 1 VIEW: CPT

## 2019-01-01 PROCEDURE — 82948 REAGENT STRIP/BLOOD GLUCOSE: CPT

## 2019-01-01 PROCEDURE — 80076 HEPATIC FUNCTION PANEL: CPT

## 2019-01-01 PROCEDURE — 83735 ASSAY OF MAGNESIUM: CPT

## 2019-01-01 PROCEDURE — 84460 ALANINE AMINO (ALT) (SGPT): CPT

## 2019-01-01 RX ORDER — BUMETANIDE 0.25 MG/ML
2 INJECTION, SOLUTION INTRAMUSCULAR; INTRAVENOUS 2 TIMES DAILY
COMMUNITY

## 2019-01-01 RX ORDER — TIOTROPIUM BROMIDE 18 UG/1
CAPSULE ORAL; RESPIRATORY (INHALATION)
Qty: 30 CAPSULE | Refills: 11 | Status: SHIPPED | OUTPATIENT
Start: 2019-01-01 | End: 2019-01-01 | Stop reason: ALTCHOICE

## 2019-01-01 RX ORDER — LOSARTAN POTASSIUM 50 MG/1
50 TABLET ORAL DAILY
COMMUNITY

## 2019-01-01 RX ORDER — ALBUTEROL SULFATE 90 UG/1
2 AEROSOL, METERED RESPIRATORY (INHALATION) EVERY 6 HOURS PRN
Qty: 1 INHALER | Refills: 11 | Status: SHIPPED | OUTPATIENT
Start: 2019-01-01

## 2019-01-01 RX ORDER — GUAIFENESIN 600 MG/1
600 TABLET, EXTENDED RELEASE ORAL 2 TIMES DAILY
Qty: 60 TABLET | Refills: 0 | Status: SHIPPED | OUTPATIENT
Start: 2019-01-01 | End: 2019-01-01

## 2019-01-01 RX ORDER — METOPROLOL SUCCINATE 50 MG/1
50 TABLET, EXTENDED RELEASE ORAL DAILY
COMMUNITY

## 2019-01-01 RX ORDER — PREDNISONE 50 MG/1
50 TABLET ORAL DAILY
Qty: 5 TABLET | Refills: 0 | Status: SHIPPED | OUTPATIENT
Start: 2019-01-01 | End: 2019-01-01

## 2019-01-01 RX ORDER — SPIRONOLACTONE 25 MG/1
25 TABLET ORAL DAILY
COMMUNITY

## 2019-01-01 ASSESSMENT — ENCOUNTER SYMPTOMS
VOMITING: 0
DIARRHEA: 0
ALLERGIC/IMMUNOLOGIC NEGATIVE: 1
STRIDOR: 0
EYE DISCHARGE: 0
SHORTNESS OF BREATH: 1
SHORTNESS OF BREATH: 1
STRIDOR: 0
STRIDOR: 0
BACK PAIN: 0
CHEST TIGHTNESS: 0
NAUSEA: 0
ALLERGIC/IMMUNOLOGIC NEGATIVE: 1
COUGH: 0
NAUSEA: 0
SHORTNESS OF BREATH: 0
SHORTNESS OF BREATH: 1
DIARRHEA: 0
BACK PAIN: 0
EYES NEGATIVE: 1
COUGH: 0
BACK PAIN: 0
ABDOMINAL PAIN: 0
COUGH: 0
ABDOMINAL PAIN: 0
WHEEZING: 0
CHEST TIGHTNESS: 1
NAUSEA: 0
WHEEZING: 0
ALLERGIC/IMMUNOLOGIC NEGATIVE: 1
WHEEZING: 0
CHEST TIGHTNESS: 0
WHEEZING: 1
EYES NEGATIVE: 1
DIARRHEA: 0
DIARRHEA: 0
NAUSEA: 0
BLOOD IN STOOL: 0
EYE PAIN: 0
EYES NEGATIVE: 1
VOMITING: 0
SORE THROAT: 0

## 2019-04-05 NOTE — ED NOTES
Pt evaluated. Alert and oriented at this time. Respirations easy and unlabored. Pt denies any complaints at this time. Blood sugar recheck 181.       Meño Alberto, CINDI  04/05/19 9814

## 2019-04-05 NOTE — ED NOTES
Patient resting in bed. Family at the bedside. Given a blanket. Denies needs. Declines more snacks or food at this time.   450 ml of urine emptied from urinal.     Dior Espinal RN  04/05/19 [id number]

## 2019-04-05 NOTE — ED PROVIDER NOTES
mellitus without mention of complication, not stated as uncontrolled. SURGICAL HISTORY      has a past surgical history that includes Coronary angioplasty with stent; Carpal tunnel release (Right); Elbow surgery (Bilateral); Rotator cuff repair (Bilateral); knee surgery (Left); Inguinal hernia repair (Bilateral); Cataract removal (Bilateral, 2012, 2016); Colonoscopy (2006,2010,2016); Upper gastrointestinal endoscopy (2006,2007,2010,2012, 2016); and pr egd transoral biopsy single/multiple (Left, 7/20/2017). CURRENT MEDICATIONS       Previous Medications    ACETAMINOPHEN (TYLENOL) 325 MG TABLET    Take 650 mg by mouth every 6 hours as needed for Pain    ALBUTEROL SULFATE  (90 BASE) MCG/ACT INHALER    Inhale 2 puffs into the lungs every 6 hours as needed for Wheezing    ASCORBIC ACID (VITAMIN C) 500 MG TABLET    Take 500 mg by mouth daily    ASPIRIN 81 MG EC TABLET    Take 81 mg by mouth nightly     B COMPLEX-FOLIC ACID (B COMPLEX PLUS PO)    Take 1 tablet by mouth daily    CARVEDILOL (COREG) 25 MG TABLET    Take 1.5 tablets by mouth 2 times daily (with meals)    CINNAMON PO    Take 2 capsules by mouth daily    CPAP MACHINE MISC    by Does not apply route Please change CPAP pressure to 13 cm H20. EXENATIDE (BYDUREON) 2 MG PEN    Inject 2 mg into the skin once a week Takes on Sunday     GLUCOSE MONITORING KIT (FREESTYLE) MONITORING KIT    Need one glucometer and 100 testing strips for 3-4 times per day testing.   Diagnosis is diabetes type 2    HYDRALAZINE (APRESOLINE) 25 MG TABLET    Take 1 tablet by mouth 3 times daily    INSULIN DISPOSABLE PUMP (V-GO 30) KIT    Humalog insulin   2 units/click  Pt uses 1-2 clicks prior to breakfast  6 clicks prior to lunch  6 clicks prior to evening meal   1-2 clicks with a snack    INSULIN SYRINGE-NEEDLE U-100 (INSULIN SYRINGE .3CC/31GX5/16\") 31G X 5/16\" 0.3 ML MISC    1 each by Does not apply route daily    LANCETS MISC    1 each by Does not apply route daily LEVOTHYROXINE (SYNTHROID) 25 MCG TABLET    Take 25 mcg by mouth daily    MOMETASONE-FORMOTEROL (DULERA) 200-5 MCG/ACT INHALER    Inhale 2 puffs into the lungs every 12 hours Rinse mouth after its use. MULTIPLE VITAMIN (MULTI VITAMIN MENS PO)    Take 1 tablet by mouth daily     OMEPRAZOLE (PRILOSEC) 20 MG CAPSULE    Take 1 capsule by mouth every morning (before breakfast)    PRAVASTATIN (PRAVACHOL) 80 MG TABLET    Take 1 tablet by mouth daily    TIOTROPIUM (SPIRIVA HANDIHALER) 18 MCG INHALATION CAPSULE    Inhale 1 capsule into the lungs daily       ALLERGIES     is allergic to amlodipine. FAMILY HISTORY     indicated that the status of his father is unknown. He indicated that the status of his brother is unknown. He indicated that the status of his neg hx is unknown.   family history includes Cancer (age of onset: 52) in his brother; Heart Disease in his father. SOCIAL HISTORY      reports that he quit smoking about 19 years ago. His smoking use included cigarettes. He has a 20.00 pack-year smoking history. He has never used smokeless tobacco. He reports that he does not drink alcohol or use drugs. PHYSICAL EXAM     INITIAL VITALS:  height is 5' 9\" (1.753 m) and weight is 206 lb (93.4 kg). His temperature is 97.6 °F (36.4 °C). His blood pressure is 165/81 (abnormal) and his pulse is 67. His respiration is 16 and oxygen saturation is 100%. Physical Exam   Constitutional: He is oriented to person, place, and time. He appears well-developed and well-nourished. He is active. No distress. He was eating peanut butter and drinking soft drink. HENT:   Head: Normocephalic and atraumatic. Mouth/Throat: Oropharynx is clear and moist. No oropharyngeal exudate. Eyes: Pupils are equal, round, and reactive to light. Conjunctivae and EOM are normal. No scleral icterus. Neck: Neck supple. No thyromegaly present. Cardiovascular: Normal rate, regular rhythm and normal heart sounds.    No murmur PM:  Is awake, alert. Stated that he was feeling well. He denies any symptoms. FINAL IMPRESSION      1. Hypoglycemia    2. Essential hypertension          DISPOSITION/PLAN   He was discharged home in stable condition, he was given discharge instructions and was advised to return if worse or new symptoms.     PATIENT REFERRED TO:  Hayder Bradley MD  [address]  [address] [place] [address]    In 2 days        DISCHARGE MEDICATIONS:  New Prescriptions    No medications on file       (Please note that portions of this note were completed with a voice recognition program.  Efforts were made to edit the dictations but occasionally words are mis-transcribed.)    MD Cole Singleton MD  04/05/19 3187

## 2019-04-22 NOTE — PLAN OF CARE
Ongoing  Patient plans to return home with wife at discharge. Comments:   Care plan reviewed with patient and family. Patient and family verbalize understanding of the plan of care and contribute to goal setting. Drowsy

## 2019-08-04 NOTE — ED NOTES
Dr Ysidro Favre talking with Dr Pallavi Walton and Odalys Headings process started.      Kayleigh Delong RN  09/07/17 2000
[id number].      Reford CINDI Moyer  09/07/17 7934
Pt complains of epigastric pain  that radiates to his chest. Pt states pain woke him up at 0300 this morning, pt complains of pain going to bilateral shoulders. Pt admits to feeling SOB at times. Pt states he is scheduled to have a pacemaker placed Sept 19th and is also scheduled to have his gallbladder removed and hernia fixed in Oct. Pt alert, resp even and unlabored, skin pale, warm and dry, bilateral pedal edema noted. Cardiac monitor applied.       Sameer Caraballo RN  09/07/17 6404
Pt rates pain a 10, 2nd NTG held due to low B/P, Dr Yo Medina infomred. 200ml bolus of saline started.      Elisabeth Zamudio RN  09/07/17 4879
Pt resting, resp even and unlabored, skin pale, warm and dry. Pt continues to rate chest pain a 10. IV continues at 100ml/hr, site soft and without edema or redness. [facility] here and report given, pt released to be transported to [facility] in guarded condition.       Klaudia Matthew RN  09/07/17 4783
X-ray here.      Sirisha Gould RN  09/07/17 3868
04-Aug-2019 11:49

## 2019-09-16 NOTE — PROGRESS NOTES
INGUINAL HERNIA REPAIR Bilateral     KNEE SURGERY Left     VT EGD TRANSORAL BIOPSY SINGLE/MULTIPLE Left 2017    EGD BIOPSY performed by Cira Singh MD at [address]  ,,,,      SOCIAL HISTORY:  Social History     Tobacco Use    Smoking status: Former Smoker     Packs/day: 0.50     Years: 40.00     Pack years: 20.00     Types: Cigarettes     Last attempt to quit: 3/9/2000     Years since quittin.5    Smokeless tobacco: Never Used   Substance Use Topics    Alcohol use: No     Alcohol/week: 0.0 standard drinks    Drug use: No     ALLERGIES:  Allergies   Allergen Reactions    Amlodipine Itching and Swelling     swelling     FAMILY HISTORY:  Family History   Problem Relation Age of Onset    Heart Disease Father     Cancer Brother 52        type unknown    Colon Cancer Neg Hx     Inflam Bowel Dis Neg Hx      CURRENT MEDICATIONS:  Current Outpatient Medications   Medication Sig Dispense Refill    losartan (COZAAR) 50 MG tablet Take 50 mg by mouth daily      spironolactone (ALDACTONE) 25 MG tablet Take 25 mg by mouth daily      bumetanide (BUMEX) 0.25 MG/ML injection Infuse 2 mg intravenously 2 times daily      metoprolol succinate (TOPROL XL) 50 MG extended release tablet Take 50 mg by mouth daily      predniSONE (DELTASONE) 50 MG tablet Take 1 tablet by mouth daily for 5 days 5 tablet 0    guaiFENesin (MUCINEX) 600 MG extended release tablet Take 1 tablet by mouth 2 times daily 60 tablet 0    albuterol sulfate  (90 Base) MCG/ACT inhaler Inhale 2 puffs into the lungs every 6 hours as needed for Wheezing 1 Inhaler 11    tiotropium (SPIRIVA HANDIHALER) 18 MCG inhalation capsule Inhale 1 capsule into the lungs daily 30 capsule 11    carvedilol (COREG) 25 MG tablet Take 1.5 tablets by mouth 2 times daily (with meals) 90 tablet 0    Insulin Disposable Pump (V-GO 30) KIT Humalog insulin   2 tightness and shortness of breath. Negative for cough, wheezing and stridor. Cardiovascular: Negative for chest pain, palpitations and leg swelling. Gastrointestinal: Negative for abdominal pain, diarrhea, nausea and vomiting. Endocrine: Negative. Genitourinary: Negative. Musculoskeletal: Negative. Negative for arthralgias and back pain. Skin: Negative. Allergic/Immunologic: Negative. Neurological: Negative. Negative for dizziness and light-headedness. Hematological: Does not bruise/bleed easily. Psychiatric/Behavioral: Negative. Negative for sleep disturbance and suicidal ideas. All other systems reviewed and are negative. Physical exam   /64 (Site: Left Upper Arm, Position: Sitting, Cuff Size: Medium Adult)   Pulse 63   Temp 97.8 °F (36.6 °C) (Oral)   Ht 5' 9\" (1.753 m)   Wt 225 lb 12.8 oz (102.4 kg)   SpO2 97% Comment: on room air  BMI 33.34 kg/m²        Physical Exam   Constitutional: He is oriented to person, place, and time. He appears well-developed and well-nourished. No distress. HENT:   Nose: No mucosal edema or rhinorrhea. Right sinus exhibits no maxillary sinus tenderness and no frontal sinus tenderness. Left sinus exhibits no maxillary sinus tenderness and no frontal sinus tenderness. Mouth/Throat: Abnormal dentition (missing teeth). Dental caries present. Posterior oropharyngeal erythema present. No oropharyngeal exudate. Eyes: Conjunctivae are normal. Right eye exhibits no discharge. No scleral icterus. Neck: Neck supple. No JVD present. Cardiovascular: Normal rate and regular rhythm. Exam reveals no friction rub. No murmur heard. Pulmonary/Chest: Effort normal and breath sounds normal. No respiratory distress. He has no wheezes. He has no rales. He exhibits no tenderness. Abdominal: Soft. Small insulin pump  to left lower quadrant of abdomen    Musculoskeletal: He exhibits no edema or tenderness.    Lymphadenopathy:     He has no

## 2025-03-21 NOTE — PROGRESS NOTES
[id number]    {Exam; Complete Normal And System Select:41927}    ECG: {Findings; diagnostics ecg readin}.    Data Review{icu labs:19888}    Assessment:     Active Problems:    ALANA (obstructive sleep apnea)    Lung nodule    Shortness of breath    Type 2 diabetes mellitus with hyperglycemia, with long-term current use of insulin (HCC)    Essential hypertension    Chronic obstructive pulmonary disease (HCC)    Mediastinal lymphadenopathy    Anemia of chronic kidney failure    Acute on chronic combined systolic (congestive) and diastolic (congestive) heart failure (HCC)    CKD (chronic kidney disease), stage III    Hyperglycemia    Elevated lipase    Chest pain    Diabetes mellitus due to underlying condition with stage 3 chronic kidney disease, with long-term current use of insulin (HCC)    Hyperkalemia    Leukocytosis, unspecified    Normocytic anemia    Acute hypercapnic respiratory failure (HCC)    Pulmonary edema cardiac cause ([facility] [address]      Plan:     *** Yes